# Patient Record
Sex: FEMALE | Race: WHITE | Employment: FULL TIME | URBAN - METROPOLITAN AREA
[De-identification: names, ages, dates, MRNs, and addresses within clinical notes are randomized per-mention and may not be internally consistent; named-entity substitution may affect disease eponyms.]

---

## 2017-05-22 PROBLEM — I10 ESSENTIAL HYPERTENSION: Status: ACTIVE | Noted: 2017-05-22

## 2017-08-31 PROBLEM — M16.0 PRIMARY OSTEOARTHRITIS OF BOTH HIPS: Status: ACTIVE | Noted: 2017-08-31

## 2017-08-31 PROBLEM — M17.12 PRIMARY OSTEOARTHRITIS OF LEFT KNEE: Status: ACTIVE | Noted: 2017-08-31

## 2017-08-31 PROBLEM — M79.10 MYALGIA: Status: ACTIVE | Noted: 2017-08-31

## 2017-08-31 PROBLEM — R53.83 FATIGUE: Status: ACTIVE | Noted: 2017-08-31

## 2018-04-18 RX ORDER — METOPROLOL TARTRATE 50 MG/1
TABLET, FILM COATED ORAL
Qty: 60 TABLET | Refills: 4 | Status: SHIPPED | OUTPATIENT
Start: 2018-04-18 | End: 2018-09-16 | Stop reason: SDUPTHER

## 2018-05-15 RX ORDER — LOSARTAN POTASSIUM 100 MG/1
TABLET ORAL
Qty: 30 TABLET | Refills: 4 | Status: SHIPPED | OUTPATIENT
Start: 2018-05-15 | End: 2018-10-17 | Stop reason: SDUPTHER

## 2018-09-17 RX ORDER — METOPROLOL TARTRATE 50 MG/1
TABLET, FILM COATED ORAL
Qty: 60 TABLET | Refills: 3 | Status: SHIPPED | OUTPATIENT
Start: 2018-09-17 | End: 2019-01-11 | Stop reason: SDUPTHER

## 2018-10-18 RX ORDER — LOSARTAN POTASSIUM 100 MG/1
TABLET ORAL
Qty: 30 TABLET | Refills: 3 | Status: SHIPPED | OUTPATIENT
Start: 2018-10-18 | End: 2019-02-12 | Stop reason: SDUPTHER

## 2019-01-11 RX ORDER — METOPROLOL TARTRATE 50 MG/1
TABLET, FILM COATED ORAL
Qty: 60 TABLET | Refills: 5 | Status: SHIPPED | OUTPATIENT
Start: 2019-01-11 | End: 2019-07-11 | Stop reason: SDUPTHER

## 2019-02-12 RX ORDER — LOSARTAN POTASSIUM 100 MG/1
TABLET ORAL
Qty: 30 TABLET | Refills: 5 | Status: SHIPPED | OUTPATIENT
Start: 2019-02-12 | End: 2019-08-11 | Stop reason: SDUPTHER

## 2019-05-20 ENCOUNTER — HOSPITAL ENCOUNTER (OUTPATIENT)
Age: 54
Discharge: HOME | End: 2019-05-22
Payer: COMMERCIAL

## 2019-05-20 ENCOUNTER — OFFICE VISIT (OUTPATIENT)
Dept: FAMILY MEDICINE CLINIC | Age: 54
End: 2019-05-20
Payer: COMMERCIAL

## 2019-05-20 VITALS
SYSTOLIC BLOOD PRESSURE: 122 MMHG | WEIGHT: 248 LBS | RESPIRATION RATE: 16 BRPM | OXYGEN SATURATION: 94 % | HEART RATE: 61 BPM | BODY MASS INDEX: 42.34 KG/M2 | HEIGHT: 64 IN | DIASTOLIC BLOOD PRESSURE: 80 MMHG

## 2019-05-20 DIAGNOSIS — R53.83 FATIGUE, UNSPECIFIED TYPE: ICD-10-CM

## 2019-05-20 DIAGNOSIS — I10 ESSENTIAL HYPERTENSION: Primary | ICD-10-CM

## 2019-05-20 DIAGNOSIS — I10 ESSENTIAL HYPERTENSION: ICD-10-CM

## 2019-05-20 DIAGNOSIS — M79.10 MYALGIA: ICD-10-CM

## 2019-05-20 LAB
HCT VFR BLD CALC: 44.6 % (ref 34–48)
HEMOGLOBIN: 14.3 G/DL (ref 11.5–15.5)
MCH RBC QN AUTO: 28.3 PG (ref 26–35)
MCHC RBC AUTO-ENTMCNC: 32.1 % (ref 32–34.5)
MCV RBC AUTO: 88.1 FL (ref 80–99.9)
PDW BLD-RTO: 13.6 FL (ref 11.5–15)
PLATELET # BLD: 230 E9/L (ref 130–450)
PMV BLD AUTO: 12.1 FL (ref 7–12)
RBC # BLD: 5.06 E12/L (ref 3.5–5.5)
WBC # BLD: 6.2 E9/L (ref 4.5–11.5)

## 2019-05-20 PROCEDURE — 84443 ASSAY THYROID STIM HORMONE: CPT

## 2019-05-20 PROCEDURE — 80053 COMPREHEN METABOLIC PANEL: CPT

## 2019-05-20 PROCEDURE — G8427 DOCREV CUR MEDS BY ELIG CLIN: HCPCS | Performed by: FAMILY MEDICINE

## 2019-05-20 PROCEDURE — 36415 COLL VENOUS BLD VENIPUNCTURE: CPT | Performed by: FAMILY MEDICINE

## 2019-05-20 PROCEDURE — G8419 CALC BMI OUT NRM PARAM NOF/U: HCPCS | Performed by: FAMILY MEDICINE

## 2019-05-20 PROCEDURE — 82607 VITAMIN B-12: CPT

## 2019-05-20 PROCEDURE — 80061 LIPID PANEL: CPT

## 2019-05-20 PROCEDURE — 85027 COMPLETE CBC AUTOMATED: CPT

## 2019-05-20 PROCEDURE — 86431 RHEUMATOID FACTOR QUANT: CPT

## 2019-05-20 PROCEDURE — 82746 ASSAY OF FOLIC ACID SERUM: CPT

## 2019-05-20 PROCEDURE — 86038 ANTINUCLEAR ANTIBODIES: CPT

## 2019-05-20 PROCEDURE — 3017F COLORECTAL CA SCREEN DOC REV: CPT | Performed by: FAMILY MEDICINE

## 2019-05-20 PROCEDURE — 99214 OFFICE O/P EST MOD 30 MIN: CPT | Performed by: FAMILY MEDICINE

## 2019-05-20 PROCEDURE — 85651 RBC SED RATE NONAUTOMATED: CPT

## 2019-05-20 PROCEDURE — 1036F TOBACCO NON-USER: CPT | Performed by: FAMILY MEDICINE

## 2019-05-20 PROCEDURE — 86039 ANTINUCLEAR ANTIBODIES (ANA): CPT

## 2019-05-20 RX ORDER — CETIRIZINE HYDROCHLORIDE 10 MG/1
10 TABLET ORAL DAILY
COMMUNITY
End: 2022-02-24

## 2019-05-20 SDOH — HEALTH STABILITY: MENTAL HEALTH: HOW MANY STANDARD DRINKS CONTAINING ALCOHOL DO YOU HAVE ON A TYPICAL DAY?: 3 OR 4

## 2019-05-20 SDOH — HEALTH STABILITY: MENTAL HEALTH: HOW OFTEN DO YOU HAVE A DRINK CONTAINING ALCOHOL?: 2-3 TIMES A WEEK

## 2019-05-20 ASSESSMENT — PATIENT HEALTH QUESTIONNAIRE - PHQ9
2. FEELING DOWN, DEPRESSED OR HOPELESS: 0
SUM OF ALL RESPONSES TO PHQ9 QUESTIONS 1 & 2: 0
1. LITTLE INTEREST OR PLEASURE IN DOING THINGS: 0
SUM OF ALL RESPONSES TO PHQ QUESTIONS 1-9: 0
SUM OF ALL RESPONSES TO PHQ QUESTIONS 1-9: 0

## 2019-05-20 ASSESSMENT — ENCOUNTER SYMPTOMS
SINUS PRESSURE: 0
EYE PAIN: 0
SHORTNESS OF BREATH: 0
NAUSEA: 0
COLOR CHANGE: 0
RHINORRHEA: 0
EYE REDNESS: 0
DIARRHEA: 0
BACK PAIN: 0
CONSTIPATION: 0
CHEST TIGHTNESS: 0
EYE ITCHING: 0
BLOOD IN STOOL: 0
SORE THROAT: 0
APNEA: 0
WHEEZING: 0
ABDOMINAL PAIN: 0
COUGH: 0
VOMITING: 0
VISUAL CHANGE: 0

## 2019-05-20 NOTE — PROGRESS NOTES
Chief Complaint:     Chief Complaint   Patient presents with    Hypertension     follow up    Pain     nerve pain, bottom of feet, feels like pins and needles, also has pain under her left armpit, cant do certain movements.  Fatigue         Foot Pain   This is a recurrent problem. The current episode started more than 1 month ago. The problem occurs intermittently. The problem has been waxing and waning. Associated symptoms include arthralgias and myalgias. Pertinent negatives include no abdominal pain, chest pain, chills, congestion, coughing, fatigue, fever, headaches, nausea, neck pain, numbness, rash, sore throat, visual change, vomiting or weakness. Nothing aggravates the symptoms. She has tried nothing for the symptoms. The treatment provided no relief. Hypertension   This is a chronic problem. The current episode started more than 1 year ago. The problem has been waxing and waning since onset. The problem is uncontrolled. Pertinent negatives include no chest pain, headaches, neck pain, palpitations or shortness of breath. There are no associated agents to hypertension. Risk factors for coronary artery disease include stress, obesity and post-menopausal state. Past treatments include angiotensin blockers, beta blockers and calcium channel blockers. The current treatment provides moderate improvement. There is no history of CVA or PVD. There is no history of renovascular disease, sleep apnea or a thyroid problem. Fatigue   This is a new problem. The current episode started more than 1 month ago. The problem occurs intermittently. The problem has been waxing and waning. Associated symptoms include arthralgias and myalgias. Pertinent negatives include no abdominal pain, chest pain, chills, congestion, coughing, fatigue, fever, headaches, nausea, neck pain, numbness, rash, sore throat, visual change, vomiting or weakness. Nothing aggravates the symptoms. She has tried nothing for the symptoms.  The treatment provided no relief. Patient Active Problem List   Diagnosis    Essential hypertension    Myalgia    Fatigue    Primary osteoarthritis of left knee    Primary osteoarthritis of both hips       Past Medical History:   Diagnosis Date    Essential hypertension 5/22/2017       History reviewed. No pertinent surgical history. Current Outpatient Medications   Medication Sig Dispense Refill    cetirizine (ZYRTEC ALLERGY) 10 MG tablet Take 10 mg by mouth daily      amLODIPine (NORVASC) 10 MG tablet TAKE ONE TABLET BY MOUTH EVERY DAY 30 tablet 5    losartan (COZAAR) 100 MG tablet TAKE ONE TABLET BY MOUTH EVERY DAY 30 tablet 5    metoprolol tartrate (LOPRESSOR) 50 MG tablet TAKE ONE TABLET BY MOUTH TWO TIMES A DAY 60 tablet 5    aspirin 81 MG tablet Take 81 mg by mouth daily      Potassium 99 MG TABS Take 99 mg by mouth       No current facility-administered medications for this visit.         No Known Allergies    Social History     Socioeconomic History    Marital status:      Spouse name: None    Number of children: None    Years of education: None    Highest education level: None   Occupational History     Employer: [employer]   Social Needs    Financial resource strain: None    Food insecurity:     Worry: None     Inability: None    Transportation needs:     Medical: None     Non-medical: None   Tobacco Use    Smoking status: Former Smoker     Packs/day: 0.50     Years: 35.00     Pack years: 17.50     Last attempt to quit: 5/20/2009     Years since quitting: 10.0    Smokeless tobacco: Never Used   Substance and Sexual Activity    Alcohol use: Yes     Frequency: 2-3 times a week     Drinks per session: 3 or 4    Drug use: None    Sexual activity: None   Lifestyle    Physical activity:     Days per week: None     Minutes per session: None    Stress: None   Relationships    Social connections:     Talks on phone: None     Gets together: None     Attends Mosque service: None     Active member of club or organization: None     Attends meetings of clubs or organizations: None     Relationship status: None    Intimate partner violence:     Fear of current or ex partner: None     Emotionally abused: None     Physically abused: None     Forced sexual activity: None   Other Topics Concern    None   Social History Narrative    None       History reviewed. No pertinent family history. Review of Systems   Constitutional: Negative for activity change, appetite change, chills, fatigue and fever. HENT: Negative for congestion, ear pain, hearing loss, nosebleeds, rhinorrhea, sinus pressure and sore throat. Eyes: Negative for pain, redness, itching and visual disturbance. Respiratory: Negative for apnea, cough, chest tightness, shortness of breath and wheezing. Cardiovascular: Negative for chest pain, palpitations and leg swelling. Gastrointestinal: Negative for abdominal pain, blood in stool, constipation, diarrhea, nausea and vomiting. Endocrine: Negative. Genitourinary: Negative for decreased urine volume, difficulty urinating, dysuria, frequency, hematuria and urgency. Musculoskeletal: Positive for arthralgias and myalgias. Negative for back pain, gait problem and neck pain. Skin: Negative for color change and rash. Allergic/Immunologic: Negative for environmental allergies and food allergies. Neurological: Negative for dizziness, tingling, weakness, light-headedness, numbness and headaches. Hematological: Negative for adenopathy. Does not bruise/bleed easily. Psychiatric/Behavioral: Negative for behavioral problems, dysphoric mood and sleep disturbance. The patient is not nervous/anxious and is not hyperactive. All other systems reviewed and are negative.         /80   Pulse 61   Resp 16   Ht 5' 4\" (1.626 m)   Wt 248 lb (112.5 kg)   SpO2 94%   BMI 42.57 kg/m²     Physical Exam   Constitutional: She is oriented to person, place, and time. She appears well-developed and well-nourished. HENT:   Head: Normocephalic and atraumatic. Right Ear: External ear normal.   Left Ear: External ear normal.   Nose: Nose normal.   Mouth/Throat: Oropharynx is clear and moist.   Eyes: Pupils are equal, round, and reactive to light. Conjunctivae and EOM are normal. No scleral icterus. Neck: Normal range of motion. Neck supple. No thyromegaly present. Cardiovascular: Normal rate, regular rhythm and normal heart sounds. No murmur heard. Pulmonary/Chest: Effort normal and breath sounds normal. No respiratory distress. She has no wheezes. She has no rales. Abdominal: Soft. Bowel sounds are normal. She exhibits no distension. There is no tenderness. Musculoskeletal: Normal range of motion. She exhibits no edema or tenderness. Lymphadenopathy:     She has no cervical adenopathy. Neurological: She is alert and oriented to person, place, and time. She has normal reflexes. She displays normal reflexes. No cranial nerve deficit. Skin: Skin is warm and dry. No rash noted. No erythema. Psychiatric: She has a normal mood and affect. Nursing note and vitals reviewed. ASSESSMENT/PLAN:    Patient Active Problem List   Diagnosis    Essential hypertension    Myalgia    Fatigue    Primary osteoarthritis of left knee    Primary osteoarthritis of both hips       Caroline Neri was seen today for hypertension, pain and fatigue. Diagnoses and all orders for this visit:    Essential hypertension  -     CBC; Future  -     Comprehensive Metabolic Panel; Future  -     Lipid Panel; Future  -     TSH without Reflex; Future    Myalgia  -     Sedimentation Rate; Future  -     Rheumatoid Factor; Future  -     BELEN; Future    Fatigue, unspecified type  -     Vitamin B12 & Folate; Future          Return in about 6 months (around 11/20/2019) for Follow up HTN, Recheck labs, Recheck Meds.         Dian Brizuela DO  5/20/2019  9:34 AM

## 2019-05-21 LAB
ALBUMIN SERPL-MCNC: 4.3 G/DL (ref 3.5–5.2)
ALP BLD-CCNC: 56 U/L (ref 35–104)
ALT SERPL-CCNC: 22 U/L (ref 0–32)
ANION GAP SERPL CALCULATED.3IONS-SCNC: 16 MMOL/L (ref 7–16)
AST SERPL-CCNC: 17 U/L (ref 0–31)
BILIRUB SERPL-MCNC: 0.7 MG/DL (ref 0–1.2)
BUN BLDV-MCNC: 14 MG/DL (ref 6–20)
CALCIUM SERPL-MCNC: 9.5 MG/DL (ref 8.6–10.2)
CHLORIDE BLD-SCNC: 105 MMOL/L (ref 98–107)
CHOLESTEROL, TOTAL: 194 MG/DL (ref 0–199)
CO2: 24 MMOL/L (ref 22–29)
CREAT SERPL-MCNC: 0.6 MG/DL (ref 0.5–1)
FOLATE: 12.4 NG/ML (ref 4.8–24.2)
GFR AFRICAN AMERICAN: >60
GFR NON-AFRICAN AMERICAN: >60 ML/MIN/1.73
GLUCOSE BLD-MCNC: 108 MG/DL (ref 74–99)
HDLC SERPL-MCNC: 57 MG/DL
LDL CHOLESTEROL CALCULATED: 116 MG/DL (ref 0–99)
POTASSIUM SERPL-SCNC: 4.1 MMOL/L (ref 3.5–5)
RHEUMATOID FACTOR: <10 IU/ML (ref 0–13)
SEDIMENTATION RATE, ERYTHROCYTE: 2 MM/HR (ref 0–20)
SODIUM BLD-SCNC: 145 MMOL/L (ref 132–146)
TOTAL PROTEIN: 7 G/DL (ref 6.4–8.3)
TRIGL SERPL-MCNC: 105 MG/DL (ref 0–149)
TSH SERPL DL<=0.05 MIU/L-ACNC: 1.34 UIU/ML (ref 0.27–4.2)
VITAMIN B-12: 543 PG/ML (ref 211–946)
VLDLC SERPL CALC-MCNC: 21 MG/DL

## 2019-05-21 NOTE — TELEPHONE ENCOUNTER
Pt states that she has used her mothers gel and it helps a lot. She is asking that you send her some in her name.

## 2019-05-22 LAB
ANA PATTERN: ABNORMAL
ANA TITER: ABNORMAL
ANTI-NUCLEAR ANTIBODY (ANA): POSITIVE

## 2019-05-28 ENCOUNTER — TELEPHONE (OUTPATIENT)
Dept: FAMILY MEDICINE CLINIC | Age: 54
End: 2019-05-28

## 2019-05-28 DIAGNOSIS — R76.8 ANA POSITIVE: ICD-10-CM

## 2019-05-28 DIAGNOSIS — M79.10 MYALGIA: Primary | ICD-10-CM

## 2019-05-28 NOTE — TELEPHONE ENCOUNTER
----- Message from Peng Pollard MA sent at 5/28/2019 10:56 AM EDT -----  Pt notified.   Please send referral.

## 2019-07-11 RX ORDER — METOPROLOL TARTRATE 50 MG/1
TABLET, FILM COATED ORAL
Qty: 60 TABLET | Refills: 4 | Status: SHIPPED | OUTPATIENT
Start: 2019-07-11 | End: 2019-12-10 | Stop reason: SDUPTHER

## 2019-07-22 ENCOUNTER — OFFICE VISIT (OUTPATIENT)
Dept: RHEUMATOLOGY | Age: 54
End: 2019-07-22
Payer: COMMERCIAL

## 2019-07-22 ENCOUNTER — HOSPITAL ENCOUNTER (OUTPATIENT)
Age: 54
Discharge: HOME | End: 2019-07-24
Payer: COMMERCIAL

## 2019-07-22 VITALS
TEMPERATURE: 98.1 F | SYSTOLIC BLOOD PRESSURE: 132 MMHG | DIASTOLIC BLOOD PRESSURE: 88 MMHG | OXYGEN SATURATION: 95 % | BODY MASS INDEX: 40.74 KG/M2 | WEIGHT: 238.6 LBS | HEART RATE: 74 BPM | HEIGHT: 64 IN

## 2019-07-22 DIAGNOSIS — G89.29 CHRONIC LEFT SHOULDER PAIN: ICD-10-CM

## 2019-07-22 DIAGNOSIS — M25.512 CHRONIC LEFT SHOULDER PAIN: ICD-10-CM

## 2019-07-22 DIAGNOSIS — R76.8 ANA POSITIVE: ICD-10-CM

## 2019-07-22 DIAGNOSIS — R76.0 RAISED ANTIBODY TITER: ICD-10-CM

## 2019-07-22 DIAGNOSIS — M13.0 POLYARTHRITIS: ICD-10-CM

## 2019-07-22 LAB — URIC ACID, SERUM: 4.8 MG/DL (ref 2.4–5.7)

## 2019-07-22 PROCEDURE — 84550 ASSAY OF BLOOD/URIC ACID: CPT

## 2019-07-22 PROCEDURE — 86225 DNA ANTIBODY NATIVE: CPT

## 2019-07-22 PROCEDURE — 36415 COLL VENOUS BLD VENIPUNCTURE: CPT

## 2019-07-22 PROCEDURE — 99203 OFFICE O/P NEW LOW 30 MIN: CPT | Performed by: INTERNAL MEDICINE

## 2019-07-22 PROCEDURE — 86255 FLUORESCENT ANTIBODY SCREEN: CPT

## 2019-07-22 PROCEDURE — 86200 CCP ANTIBODY: CPT

## 2019-07-22 PROCEDURE — 86235 NUCLEAR ANTIGEN ANTIBODY: CPT

## 2019-07-22 PROCEDURE — 86803 HEPATITIS C AB TEST: CPT

## 2019-07-22 RX ORDER — MELOXICAM 15 MG/1
15 TABLET ORAL DAILY
Qty: 30 TABLET | Refills: 2 | Status: SHIPPED | OUTPATIENT
Start: 2019-07-22 | End: 2021-03-12

## 2019-07-22 ASSESSMENT — ENCOUNTER SYMPTOMS
EYE PAIN: 0
SINUS PRESSURE: 0
EYE ITCHING: 0
SHORTNESS OF BREATH: 0
EYE DISCHARGE: 0
BLOOD IN STOOL: 0
COLOR CHANGE: 0
EYE REDNESS: 0
ABDOMINAL DISTENTION: 0
CONSTIPATION: 0
PHOTOPHOBIA: 0
TROUBLE SWALLOWING: 0
WHEEZING: 0
SORE THROAT: 0
DIARRHEA: 0
VOMITING: 0
CHEST TIGHTNESS: 0
COUGH: 0
ABDOMINAL PAIN: 0
SINUS PAIN: 0

## 2019-07-22 NOTE — PROGRESS NOTES
19    Name: Katherine Doran  : 1965  Age: 52 y.o. Sex: female    Patient is seen at the request of Ivon Smith DO    Patient presents for a rheumatology consultation regarding     Chief Complaint   Patient presents with    Joint Pain     1st MTP, L knee, Ankle, (+) BELEN    Memory Loss    Joint Swelling       + BELEN , Joint pain   Pain in hips, points outside right hip, unable to sleep on affected side   Pain and swelling in left knee   Pain in feet mainly in first MTP   No red and swollen joints   Pain increases with sitting and improves with activity,   Morning stiffness which improves after  Hot shower ,  Takes Aleve as needed which helps her   H/o left shoulder pain, unable to touch her back , no trauma, no PT,no recent imaging  Reports difficulty in balance   Denies personal and family history of Psoriasis and IBD. Vitals:    19 1447   BP: 132/88   Site: Left Lower Arm   Position: Sitting   Pulse: 74   Temp: 98.1 °F (36.7 °C)   TempSrc: Temporal   SpO2: 95%   Weight: 238 lb 9.6 oz (108.2 kg)   Height: 5' 4\" (1.626 m)        Review of Systems   Constitutional: Negative for chills, fatigue, fever and unexpected weight change. HENT: Negative for congestion, ear discharge, ear pain, hearing loss, mouth sores, nosebleeds, sinus pressure, sinus pain, sore throat and trouble swallowing. Dry eyes , confirmed by Ophthalmologist    Eyes: Negative for photophobia, pain, discharge, redness and itching. Respiratory: Negative for cough, chest tightness, shortness of breath and wheezing. Cardiovascular: Negative for chest pain, palpitations and leg swelling. Gastrointestinal: Negative for abdominal distention, abdominal pain, blood in stool, constipation, diarrhea and vomiting. Endocrine: Negative for cold intolerance, heat intolerance, polydipsia, polyphagia and polyuria. Genitourinary: Negative for dysuria, flank pain, genital sores and hematuria.    Musculoskeletal:

## 2019-07-23 LAB — HEPATITIS C ANTIBODY INTERPRETATION: NORMAL

## 2019-07-24 LAB
ANTI DNA DOUBLE STRANDED: NEGATIVE
ANTI JO-1 IGG: NEGATIVE
ANTI-MITOCHONDRIAL AB, IFA: NEGATIVE
ENA TO RNP ANTIBODY: NEGATIVE
ENA TO SMITH (SM) ANTIBODY: NEGATIVE
ENA TO SSA (RO) ANTIBODY: NEGATIVE
ENA TO SSB (LA) ANTIBODY: NEGATIVE
SCLERODERMA (SCL-70) AB: NEGATIVE

## 2019-07-25 LAB — CCP IGG ANTIBODIES: 3 UNITS (ref 0–19)

## 2019-08-05 ENCOUNTER — TELEPHONE (OUTPATIENT)
Dept: RHEUMATOLOGY | Age: 54
End: 2019-08-05

## 2019-08-07 ENCOUNTER — OFFICE VISIT (OUTPATIENT)
Dept: RHEUMATOLOGY | Age: 54
End: 2019-08-07
Payer: COMMERCIAL

## 2019-08-07 VITALS
BODY MASS INDEX: 40.8 KG/M2 | DIASTOLIC BLOOD PRESSURE: 82 MMHG | OXYGEN SATURATION: 99 % | HEIGHT: 64 IN | SYSTOLIC BLOOD PRESSURE: 134 MMHG | TEMPERATURE: 97.5 F | HEART RATE: 62 BPM | WEIGHT: 239 LBS

## 2019-08-07 DIAGNOSIS — M25.512 CHRONIC LEFT SHOULDER PAIN: Primary | ICD-10-CM

## 2019-08-07 DIAGNOSIS — G89.29 CHRONIC LEFT SHOULDER PAIN: Primary | ICD-10-CM

## 2019-08-07 PROCEDURE — 99214 OFFICE O/P EST MOD 30 MIN: CPT | Performed by: INTERNAL MEDICINE

## 2019-08-07 ASSESSMENT — ENCOUNTER SYMPTOMS
DIARRHEA: 0
WHEEZING: 0
COUGH: 0
EYE REDNESS: 0
SINUS PRESSURE: 0
CHEST TIGHTNESS: 0
EYE ITCHING: 0
SHORTNESS OF BREATH: 0
TROUBLE SWALLOWING: 0
ABDOMINAL DISTENTION: 0
SINUS PAIN: 0
BACK PAIN: 0
CONSTIPATION: 0
EYE DISCHARGE: 0
VOMITING: 0
EYE PAIN: 0
BLOOD IN STOOL: 0
ABDOMINAL PAIN: 0
COLOR CHANGE: 0
PHOTOPHOBIA: 0
SORE THROAT: 0

## 2019-08-07 NOTE — PROGRESS NOTES
status:      Spouse name: Not on file    Number of children: Not on file    Years of education: Not on file    Highest education level: Not on file   Occupational History     Employer: [employer]   Social Needs    Financial resource strain: Not on file    Food insecurity:     Worry: Not on file     Inability: Not on file    Transportation needs:     Medical: Not on file     Non-medical: Not on file   Tobacco Use    Smoking status: Former Smoker     Packs/day: 0.50     Years: 35.00     Pack years: 17.50     Last attempt to quit: 5/20/2009     Years since quitting: 10.2    Smokeless tobacco: Never Used   Substance and Sexual Activity    Alcohol use: Yes     Frequency: 2-3 times a week     Drinks per session: 3 or 4    Drug use: Never    Sexual activity: Not Currently     Partners: Male   Lifestyle    Physical activity:     Days per week: Not on file     Minutes per session: Not on file    Stress: Not on file   Relationships    Social connections:     Talks on phone: Not on file     Gets together: Not on file     Attends Restorationist service: Not on file     Active member of club or organization: Not on file     Attends meetings of clubs or organizations: Not on file     Relationship status: Not on file    Intimate partner violence:     Fear of current or ex partner: Not on file     Emotionally abused: Not on file     Physically abused: Not on file     Forced sexual activity: Not on file   Other Topics Concern    Not on file   Social History Narrative    Not on file      Social History     Social History Narrative    One son        Vitals:    08/07/19 1038   BP: 134/82   Site: Left Upper Arm   Position: Sitting   Pulse: 62   Temp: 97.5 °F (36.4 °C)   TempSrc: Temporal   SpO2: 99%   Weight: 239 lb (108.4 kg)   Height: 5' 4\" (1.626 m)        Physical Exam   Constitutional: She appears well-developed and well-nourished. No distress. HENT:   Head: Normocephalic.    Right Ear: External ear normal.

## 2019-08-12 ENCOUNTER — TELEPHONE (OUTPATIENT)
Dept: RHEUMATOLOGY | Age: 54
End: 2019-08-12

## 2019-08-12 DIAGNOSIS — M25.512 CHRONIC LEFT SHOULDER PAIN: Primary | ICD-10-CM

## 2019-08-12 DIAGNOSIS — G89.29 CHRONIC LEFT SHOULDER PAIN: Primary | ICD-10-CM

## 2019-08-12 RX ORDER — LOSARTAN POTASSIUM 100 MG/1
TABLET ORAL
Qty: 30 TABLET | Refills: 4 | Status: SHIPPED | OUTPATIENT
Start: 2019-08-12 | End: 2020-01-13

## 2019-09-23 ENCOUNTER — TELEPHONE (OUTPATIENT)
Dept: PRIMARY CARE CLINIC | Age: 54
End: 2019-09-23

## 2019-09-23 DIAGNOSIS — G89.29 CHRONIC LEFT SHOULDER PAIN: Primary | ICD-10-CM

## 2019-09-23 DIAGNOSIS — M25.512 CHRONIC LEFT SHOULDER PAIN: Primary | ICD-10-CM

## 2020-02-10 RX ORDER — METOPROLOL TARTRATE 50 MG/1
TABLET, FILM COATED ORAL
Qty: 60 TABLET | Refills: 0 | Status: SHIPPED
Start: 2020-02-10 | End: 2020-03-13

## 2020-02-14 ENCOUNTER — OFFICE VISIT (OUTPATIENT)
Dept: PRIMARY CARE CLINIC | Age: 55
End: 2020-02-14
Payer: COMMERCIAL

## 2020-02-14 VITALS
HEIGHT: 64 IN | TEMPERATURE: 101.3 F | OXYGEN SATURATION: 96 % | RESPIRATION RATE: 20 BRPM | BODY MASS INDEX: 40.8 KG/M2 | HEART RATE: 81 BPM | SYSTOLIC BLOOD PRESSURE: 114 MMHG | WEIGHT: 239 LBS | DIASTOLIC BLOOD PRESSURE: 80 MMHG

## 2020-02-14 PROBLEM — F32.1 CURRENT MODERATE EPISODE OF MAJOR DEPRESSIVE DISORDER WITHOUT PRIOR EPISODE (HCC): Status: ACTIVE | Noted: 2020-02-14

## 2020-02-14 LAB
INFLUENZA A ANTIBODY: ABNORMAL
INFLUENZA B ANTIBODY: ABNORMAL

## 2020-02-14 PROCEDURE — 99214 OFFICE O/P EST MOD 30 MIN: CPT | Performed by: FAMILY MEDICINE

## 2020-02-14 PROCEDURE — 87804 INFLUENZA ASSAY W/OPTIC: CPT | Performed by: FAMILY MEDICINE

## 2020-02-14 PROCEDURE — 96372 THER/PROPH/DIAG INJ SC/IM: CPT | Performed by: FAMILY MEDICINE

## 2020-02-14 RX ORDER — OSELTAMIVIR PHOSPHATE 75 MG/1
75 CAPSULE ORAL 2 TIMES DAILY
Qty: 10 CAPSULE | Refills: 0 | Status: SHIPPED | OUTPATIENT
Start: 2020-02-14 | End: 2020-02-19

## 2020-02-14 RX ORDER — METHYLPREDNISOLONE ACETATE 80 MG/ML
80 INJECTION, SUSPENSION INTRA-ARTICULAR; INTRALESIONAL; INTRAMUSCULAR; SOFT TISSUE ONCE
Status: COMPLETED | OUTPATIENT
Start: 2020-02-14 | End: 2020-02-14

## 2020-02-14 RX ADMIN — METHYLPREDNISOLONE ACETATE 80 MG: 80 INJECTION, SUSPENSION INTRA-ARTICULAR; INTRALESIONAL; INTRAMUSCULAR; SOFT TISSUE at 14:52

## 2020-02-14 ASSESSMENT — PATIENT HEALTH QUESTIONNAIRE - PHQ9
1. LITTLE INTEREST OR PLEASURE IN DOING THINGS: 0
SUM OF ALL RESPONSES TO PHQ9 QUESTIONS 1 & 2: 2
SUM OF ALL RESPONSES TO PHQ QUESTIONS 1-9: 2
SUM OF ALL RESPONSES TO PHQ QUESTIONS 1-9: 2
2. FEELING DOWN, DEPRESSED OR HOPELESS: 2

## 2020-02-14 ASSESSMENT — ENCOUNTER SYMPTOMS
SHORTNESS OF BREATH: 0
BLOOD IN STOOL: 0
SORE THROAT: 1
BACK PAIN: 0
COLOR CHANGE: 0
SINUS PRESSURE: 1
EYE REDNESS: 0
EYE PAIN: 0
APNEA: 0
COUGH: 1
CHEST TIGHTNESS: 0
SINUS PAIN: 1
RHINORRHEA: 1
DIARRHEA: 0
CONSTIPATION: 0
VOMITING: 0
EYE ITCHING: 0
WHEEZING: 1
ABDOMINAL PAIN: 0
NAUSEA: 0

## 2020-02-14 NOTE — PROGRESS NOTES
Chief Complaint:     Chief Complaint   Patient presents with    URI     sore throat, cough, achy, wheezing, thick mucus         URI    This is a new problem. The current episode started in the past 7 days. The problem has been unchanged. The maximum temperature recorded prior to her arrival was 100.4 - 100.9 F. Associated symptoms include congestion, coughing, ear pain, rhinorrhea, sinus pain, a sore throat and wheezing. Pertinent negatives include no abdominal pain, chest pain, diarrhea, dysuria, headaches, nausea, neck pain, rash or vomiting. She has tried nothing for the symptoms. The treatment provided no relief. Fatigue   This is a recurrent problem. The current episode started more than 1 month ago. The problem occurs intermittently. The problem has been gradually worsening. Associated symptoms include congestion, coughing, fatigue, a fever and a sore throat. Pertinent negatives include no abdominal pain, arthralgias, chest pain, chills, headaches, myalgias, nausea, neck pain, numbness, rash, vomiting or weakness. Nothing aggravates the symptoms. She has tried nothing for the symptoms. The treatment provided no relief. Mental Health Problem   The primary symptoms include dysphoric mood. The current episode started more than 1 month ago. This is a recurrent problem. The dysphoric mood began more than 2 weeks ago. The mood has been worsening since its onset. She characterizes the problem as moderate. The mood includes feelings of sadness and emptiness. Her change in mood was precipitated by a stressful event. The onset of the illness is precipitated by emotional stress and a stressful event. Additional symptoms of the illness include fatigue. Additional symptoms of the illness do not include anhedonia, insomnia, hypersomnia, appetite change, headaches or abdominal pain. She does not admit to suicidal ideas. She does not have a plan to commit suicide. She does not contemplate harming herself.  She has not already injured self. She does not contemplate injuring another person. She has not already  injured another person. Patient Active Problem List   Diagnosis    Essential hypertension    Myalgia    Fatigue    Primary osteoarthritis of left knee    Primary osteoarthritis of both hips    Polyarthritis    BELEN positive    Raised antibody titer    Chronic left shoulder pain    Current moderate episode of major depressive disorder without prior episode [facility])       Past Medical History:   Diagnosis Date    Essential hypertension 5/22/2017       History reviewed. No pertinent surgical history.     Current Outpatient Medications   Medication Sig Dispense Refill    diclofenac sodium 1 % GEL Apply 2 g topically 2 times daily 1 Tube 5    oseltamivir (TAMIFLU) 75 MG capsule Take 1 capsule by mouth 2 times daily for 5 days 10 capsule 0    amLODIPine (NORVASC) 10 MG tablet TAKE ONE TABLET BY MOUTH EVERY DAY 30 tablet 0    losartan (COZAAR) 100 MG tablet TAKE ONE TABLET BY MOUTH EVERY DAY 30 tablet 0    metoprolol tartrate (LOPRESSOR) 50 MG tablet TAKE ONE TABLET BY MOUTH TWO TIMES A DAY 60 tablet 0    meloxicam (MOBIC) 15 MG tablet Take 1 tablet by mouth daily 30 tablet 2    cetirizine (ZYRTEC ALLERGY) 10 MG tablet Take 10 mg by mouth daily      aspirin 81 MG tablet Take 81 mg by mouth daily      Potassium 99 MG TABS Take 99 mg by mouth       Current Facility-Administered Medications   Medication Dose Route Frequency Provider Last Rate Last Dose    methylPREDNISolone acetate (DEPO-MEDROL) injection 80 mg  80 mg Intramuscular Once Arnold Ali, DO           No Known Allergies    Social History     Socioeconomic History    Marital status:      Spouse name: None    Number of children: None    Years of education: None    Highest education level: None   Occupational History     Employer: [employer]   Social Needs    Financial resource strain: None    Food insecurity:     Worry: None Inability: None    Transportation needs:     Medical: None     Non-medical: None   Tobacco Use    Smoking status: Former Smoker     Packs/day: 0.50     Years: 35.00     Pack years: 17.50     Last attempt to quit: 5/20/2009     Years since quitting: 10.7    Smokeless tobacco: Never Used   Substance and Sexual Activity    Alcohol use: Yes     Frequency: 2-3 times a week     Drinks per session: 3 or 4    Drug use: Never    Sexual activity: Not Currently     Partners: Male   Lifestyle    Physical activity:     Days per week: None     Minutes per session: None    Stress: None   Relationships    Social connections:     Talks on phone: None     Gets together: None     Attends Church service: None     Active member of club or organization: None     Attends meetings of clubs or organizations: None     Relationship status: None    Intimate partner violence:     Fear of current or ex partner: None     Emotionally abused: None     Physically abused: None     Forced sexual activity: None   Other Topics Concern    None   Social History Narrative    None       History reviewed. No pertinent family history. Review of Systems   Constitutional: Positive for fatigue and fever. Negative for activity change, appetite change and chills. HENT: Positive for congestion, ear pain, postnasal drip, rhinorrhea, sinus pressure, sinus pain and sore throat. Negative for ear discharge, hearing loss and nosebleeds. Eyes: Negative for pain, redness, itching and visual disturbance. Respiratory: Positive for cough and wheezing. Negative for apnea, chest tightness and shortness of breath. Cardiovascular: Negative for chest pain, palpitations and leg swelling. Gastrointestinal: Negative for abdominal pain, blood in stool, constipation, diarrhea, nausea and vomiting. Endocrine: Negative. Genitourinary: Negative for decreased urine volume, difficulty urinating, dysuria, frequency, hematuria and urgency. Musculoskeletal: Negative for arthralgias, back pain, gait problem, myalgias and neck pain. Skin: Negative for color change and rash. Allergic/Immunologic: Negative for environmental allergies and food allergies. Neurological: Negative for dizziness, weakness, light-headedness, numbness and headaches. Hematological: Negative for adenopathy. Does not bruise/bleed easily. Psychiatric/Behavioral: Positive for dysphoric mood. Negative for behavioral problems and sleep disturbance. The patient is not nervous/anxious, does not have insomnia and is not hyperactive. All other systems reviewed and are negative. /80   Pulse 81   Temp 101.3 °F (38.5 °C) (Temporal)   Resp 20   Ht 5' 4\" (1.626 m)   Wt 239 lb (108.4 kg)   SpO2 96%   BMI 41.02 kg/m²     Physical Exam  Vitals signs and nursing note reviewed. Constitutional:       General: She is not in acute distress. Appearance: Normal appearance. She is well-developed. She is not toxic-appearing. HENT:      Head: Normocephalic and atraumatic. Right Ear: Hearing, ear canal and external ear normal. No tenderness. A middle ear effusion is present. Tympanic membrane is bulging. Left Ear: Hearing, ear canal and external ear normal. No tenderness. A middle ear effusion is present. Tympanic membrane is bulging. Nose: Mucosal edema, congestion and rhinorrhea present. No nasal deformity or septal deviation. Right Turbinates: Enlarged and swollen. Left Turbinates: Enlarged and swollen. Right Sinus: Maxillary sinus tenderness present. Left Sinus: Maxillary sinus tenderness present. Mouth/Throat:      Mouth: Mucous membranes are moist. Mucous membranes are not pale and not dry. Tongue: No lesions. Pharynx: Oropharynx is clear. Uvula midline. Posterior oropharyngeal erythema present. No oropharyngeal exudate. Tonsils: No tonsillar abscesses.    Eyes:      General: Lids are normal. No scleral icterus. Right eye: No discharge. Left eye: No discharge. Extraocular Movements: Extraocular movements intact. Right eye: Normal extraocular motion. Left eye: Normal extraocular motion. Conjunctiva/sclera: Conjunctivae normal.      Right eye: Right conjunctiva is not injected. Left eye: Left conjunctiva is not injected. Pupils: Pupils are equal, round, and reactive to light. Neck:      Musculoskeletal: Normal range of motion and neck supple. No neck rigidity or muscular tenderness. Thyroid: No thyromegaly. Cardiovascular:      Rate and Rhythm: Normal rate and regular rhythm. Heart sounds: Normal heart sounds. No murmur. Pulmonary:      Effort: Pulmonary effort is normal. No respiratory distress. Breath sounds: Normal breath sounds. No wheezing or rales. Chest:      Chest wall: No tenderness. Abdominal:      General: Bowel sounds are normal. There is no distension. Palpations: Abdomen is soft. Tenderness: There is no abdominal tenderness. Musculoskeletal: Normal range of motion. General: No tenderness. Lymphadenopathy:      Cervical: Cervical adenopathy present. Skin:     General: Skin is warm and dry. Findings: No erythema or rash. Neurological:      General: No focal deficit present. Mental Status: She is alert and oriented to person, place, and time. Cranial Nerves: No cranial nerve deficit. Deep Tendon Reflexes: Reflexes are normal and symmetric.  Reflexes normal.   Psychiatric:         Mood and Affect: Mood normal.                                 ASSESSMENT/PLAN:    Patient Active Problem List   Diagnosis    Essential hypertension    Myalgia    Fatigue    Primary osteoarthritis of left knee    Primary osteoarthritis of both hips    Polyarthritis    BELEN positive    Raised antibody titer    Chronic left shoulder pain    Current moderate episode of major depressive disorder without prior

## 2020-02-14 NOTE — LETTER
[facility]  [address] Radha MathewSanteeyo HERRERA [address]  Phone: [phone]  Fax: [phone], DO        February 14, 2020     Patient: Adrianne Deshpande   YOB: 1965   Date of Visit: 2/14/2020       To Whom it May Concern:    Lizeth Zamudio was seen in my clinic on 2/14/2020. She may return to work on 2/17/2020. If you have any questions or concerns, please don't hesitate to call.     Sincerely,         Angela Monet, DO

## 2020-02-17 ENCOUNTER — TELEPHONE (OUTPATIENT)
Dept: PRIMARY CARE CLINIC | Age: 55
End: 2020-02-17

## 2020-02-17 RX ORDER — BUPROPION HYDROCHLORIDE 150 MG/1
150 TABLET ORAL EVERY MORNING
Qty: 30 TABLET | Refills: 3 | Status: SHIPPED
Start: 2020-02-17 | End: 2020-06-10

## 2020-03-13 RX ORDER — LOSARTAN POTASSIUM 100 MG/1
TABLET ORAL
Qty: 30 TABLET | Refills: 0 | Status: SHIPPED
Start: 2020-03-13 | End: 2020-04-13

## 2020-03-13 RX ORDER — AMLODIPINE BESYLATE 10 MG/1
TABLET ORAL
Qty: 30 TABLET | Refills: 0 | Status: SHIPPED
Start: 2020-03-13 | End: 2020-04-13

## 2020-03-13 RX ORDER — METOPROLOL TARTRATE 50 MG/1
TABLET, FILM COATED ORAL
Qty: 60 TABLET | Refills: 0 | Status: SHIPPED
Start: 2020-03-13 | End: 2020-04-13

## 2020-03-24 ENCOUNTER — TELEPHONE (OUTPATIENT)
Dept: PRIMARY CARE CLINIC | Age: 55
End: 2020-03-24

## 2020-03-24 RX ORDER — BUSPIRONE HYDROCHLORIDE 5 MG/1
5 TABLET ORAL 2 TIMES DAILY
Qty: 60 TABLET | Refills: 2 | Status: SHIPPED
Start: 2020-03-24 | End: 2020-06-10

## 2020-04-13 RX ORDER — LOSARTAN POTASSIUM 100 MG/1
TABLET ORAL
Qty: 30 TABLET | Refills: 0 | Status: SHIPPED
Start: 2020-04-13 | End: 2020-05-11

## 2020-04-13 RX ORDER — AMLODIPINE BESYLATE 10 MG/1
TABLET ORAL
Qty: 30 TABLET | Refills: 0 | Status: SHIPPED
Start: 2020-04-13 | End: 2020-05-11

## 2020-04-13 RX ORDER — METOPROLOL TARTRATE 50 MG/1
TABLET, FILM COATED ORAL
Qty: 60 TABLET | Refills: 0 | Status: SHIPPED
Start: 2020-04-13 | End: 2020-05-11

## 2020-05-11 RX ORDER — LOSARTAN POTASSIUM 100 MG/1
TABLET ORAL
Qty: 30 TABLET | Refills: 0 | Status: SHIPPED
Start: 2020-05-11 | End: 2020-06-10

## 2020-05-11 RX ORDER — AMLODIPINE BESYLATE 10 MG/1
TABLET ORAL
Qty: 30 TABLET | Refills: 0 | Status: SHIPPED
Start: 2020-05-11 | End: 2020-06-10

## 2020-05-11 RX ORDER — METOPROLOL TARTRATE 50 MG/1
TABLET, FILM COATED ORAL
Qty: 60 TABLET | Refills: 0 | Status: SHIPPED
Start: 2020-05-11 | End: 2020-06-10

## 2020-06-10 RX ORDER — METOPROLOL TARTRATE 50 MG/1
TABLET, FILM COATED ORAL
Qty: 60 TABLET | Refills: 0 | Status: SHIPPED
Start: 2020-06-10 | End: 2020-07-15

## 2020-06-10 RX ORDER — LOSARTAN POTASSIUM 100 MG/1
TABLET ORAL
Qty: 30 TABLET | Refills: 0 | Status: SHIPPED
Start: 2020-06-10 | End: 2020-07-15

## 2020-06-10 RX ORDER — AMLODIPINE BESYLATE 10 MG/1
TABLET ORAL
Qty: 30 TABLET | Refills: 0 | Status: SHIPPED
Start: 2020-06-10 | End: 2020-07-15

## 2020-06-10 RX ORDER — BUPROPION HYDROCHLORIDE 150 MG/1
TABLET ORAL
Qty: 30 TABLET | Refills: 0 | Status: SHIPPED
Start: 2020-06-10 | End: 2020-07-15

## 2020-06-10 RX ORDER — BUSPIRONE HYDROCHLORIDE 5 MG/1
TABLET ORAL
Qty: 60 TABLET | Refills: 0 | Status: SHIPPED
Start: 2020-06-10 | End: 2020-07-15

## 2020-07-15 RX ORDER — BUPROPION HYDROCHLORIDE 150 MG/1
TABLET ORAL
Qty: 30 TABLET | Refills: 0 | Status: SHIPPED
Start: 2020-07-15 | End: 2020-08-12

## 2020-07-15 RX ORDER — LOSARTAN POTASSIUM 100 MG/1
TABLET ORAL
Qty: 30 TABLET | Refills: 0 | Status: SHIPPED
Start: 2020-07-15 | End: 2020-08-12

## 2020-07-15 RX ORDER — METOPROLOL TARTRATE 50 MG/1
TABLET, FILM COATED ORAL
Qty: 60 TABLET | Refills: 0 | Status: SHIPPED
Start: 2020-07-15 | End: 2020-08-12

## 2020-07-15 RX ORDER — AMLODIPINE BESYLATE 10 MG/1
TABLET ORAL
Qty: 30 TABLET | Refills: 0 | Status: SHIPPED
Start: 2020-07-15 | End: 2020-08-12

## 2020-07-15 RX ORDER — BUSPIRONE HYDROCHLORIDE 5 MG/1
TABLET ORAL
Qty: 60 TABLET | Refills: 0 | Status: SHIPPED
Start: 2020-07-15 | End: 2020-08-12

## 2020-08-12 RX ORDER — METOPROLOL TARTRATE 50 MG/1
TABLET, FILM COATED ORAL
Qty: 60 TABLET | Refills: 0 | Status: SHIPPED
Start: 2020-08-12 | End: 2020-09-14

## 2020-08-12 RX ORDER — LOSARTAN POTASSIUM 100 MG/1
TABLET ORAL
Qty: 30 TABLET | Refills: 0 | Status: SHIPPED
Start: 2020-08-12 | End: 2020-09-14

## 2020-08-12 RX ORDER — AMLODIPINE BESYLATE 10 MG/1
TABLET ORAL
Qty: 30 TABLET | Refills: 0 | Status: SHIPPED
Start: 2020-08-12 | End: 2020-09-14

## 2020-08-12 RX ORDER — BUSPIRONE HYDROCHLORIDE 5 MG/1
TABLET ORAL
Qty: 60 TABLET | Refills: 0 | Status: SHIPPED
Start: 2020-08-12 | End: 2020-09-14

## 2020-08-12 RX ORDER — BUPROPION HYDROCHLORIDE 150 MG/1
TABLET ORAL
Qty: 30 TABLET | Refills: 0 | Status: SHIPPED
Start: 2020-08-12 | End: 2020-09-14

## 2020-09-14 RX ORDER — AMLODIPINE BESYLATE 10 MG/1
TABLET ORAL
Qty: 30 TABLET | Refills: 0 | Status: SHIPPED
Start: 2020-09-14 | End: 2020-10-14

## 2020-09-14 RX ORDER — LOSARTAN POTASSIUM 100 MG/1
TABLET ORAL
Qty: 30 TABLET | Refills: 0 | Status: SHIPPED
Start: 2020-09-14 | End: 2020-10-14

## 2020-09-14 RX ORDER — BUSPIRONE HYDROCHLORIDE 5 MG/1
TABLET ORAL
Qty: 60 TABLET | Refills: 0 | Status: SHIPPED
Start: 2020-09-14 | End: 2020-10-14

## 2020-09-14 RX ORDER — BUPROPION HYDROCHLORIDE 150 MG/1
TABLET ORAL
Qty: 30 TABLET | Refills: 0 | Status: SHIPPED
Start: 2020-09-14 | End: 2020-10-14

## 2020-09-14 RX ORDER — METOPROLOL TARTRATE 50 MG/1
TABLET, FILM COATED ORAL
Qty: 60 TABLET | Refills: 0 | Status: SHIPPED
Start: 2020-09-14 | End: 2020-10-14

## 2020-10-14 RX ORDER — AMLODIPINE BESYLATE 10 MG/1
TABLET ORAL
Qty: 30 TABLET | Refills: 0 | Status: SHIPPED
Start: 2020-10-14 | End: 2020-11-10

## 2020-10-14 RX ORDER — BUSPIRONE HYDROCHLORIDE 5 MG/1
TABLET ORAL
Qty: 60 TABLET | Refills: 0 | Status: SHIPPED
Start: 2020-10-14 | End: 2020-11-10

## 2020-10-14 RX ORDER — BUPROPION HYDROCHLORIDE 150 MG/1
TABLET ORAL
Qty: 30 TABLET | Refills: 0 | Status: SHIPPED
Start: 2020-10-14 | End: 2020-11-10

## 2020-10-14 RX ORDER — LOSARTAN POTASSIUM 100 MG/1
TABLET ORAL
Qty: 30 TABLET | Refills: 0 | Status: SHIPPED
Start: 2020-10-14 | End: 2020-11-10

## 2020-10-14 RX ORDER — METOPROLOL TARTRATE 50 MG/1
TABLET, FILM COATED ORAL
Qty: 60 TABLET | Refills: 0 | Status: SHIPPED
Start: 2020-10-14 | End: 2020-11-10

## 2020-11-10 RX ORDER — BUSPIRONE HYDROCHLORIDE 5 MG/1
TABLET ORAL
Qty: 60 TABLET | Refills: 0 | Status: SHIPPED
Start: 2020-11-10 | End: 2020-12-15 | Stop reason: SDUPTHER

## 2020-11-10 RX ORDER — METOPROLOL TARTRATE 50 MG/1
TABLET, FILM COATED ORAL
Qty: 60 TABLET | Refills: 0 | Status: SHIPPED
Start: 2020-11-10 | End: 2020-12-15 | Stop reason: SDUPTHER

## 2020-11-10 RX ORDER — LOSARTAN POTASSIUM 100 MG/1
TABLET ORAL
Qty: 30 TABLET | Refills: 0 | Status: SHIPPED
Start: 2020-11-10 | End: 2020-12-15 | Stop reason: SDUPTHER

## 2020-11-10 RX ORDER — BUPROPION HYDROCHLORIDE 150 MG/1
TABLET ORAL
Qty: 30 TABLET | Refills: 0 | Status: SHIPPED
Start: 2020-11-10 | End: 2020-12-15 | Stop reason: SDUPTHER

## 2020-11-10 RX ORDER — AMLODIPINE BESYLATE 10 MG/1
TABLET ORAL
Qty: 30 TABLET | Refills: 0 | Status: SHIPPED
Start: 2020-11-10 | End: 2020-12-15 | Stop reason: SDUPTHER

## 2020-12-15 RX ORDER — BUSPIRONE HYDROCHLORIDE 5 MG/1
TABLET ORAL
Qty: 60 TABLET | Refills: 1 | Status: SHIPPED
Start: 2020-12-15 | Stop reason: SDUPTHER

## 2020-12-15 RX ORDER — BUPROPION HYDROCHLORIDE 150 MG/1
TABLET ORAL
Qty: 30 TABLET | Refills: 1 | Status: SHIPPED
Start: 2020-12-15 | Stop reason: SDUPTHER

## 2020-12-15 RX ORDER — METOPROLOL TARTRATE 50 MG/1
TABLET, FILM COATED ORAL
Qty: 60 TABLET | Refills: 1 | Status: SHIPPED
Start: 2020-12-15 | Stop reason: SDUPTHER

## 2020-12-15 RX ORDER — AMLODIPINE BESYLATE 10 MG/1
TABLET ORAL
Qty: 30 TABLET | Refills: 1 | Status: SHIPPED
Start: 2020-12-15 | Stop reason: SDUPTHER

## 2020-12-15 RX ORDER — LOSARTAN POTASSIUM 100 MG/1
TABLET ORAL
Qty: 30 TABLET | Refills: 1 | Status: SHIPPED
Start: 2020-12-15 | Stop reason: SDUPTHER

## 2021-02-07 RX ORDER — BUPROPION HYDROCHLORIDE 150 MG/1
TABLET ORAL
Qty: 30 TABLET | Refills: 0 | Status: SHIPPED
Start: 2021-02-07 | End: 2021-03-13

## 2021-02-07 RX ORDER — BUSPIRONE HYDROCHLORIDE 5 MG/1
TABLET ORAL
Qty: 60 TABLET | Refills: 0 | Status: SHIPPED
Start: 2021-02-07 | End: 2021-03-13

## 2021-02-07 RX ORDER — METOPROLOL TARTRATE 50 MG/1
TABLET, FILM COATED ORAL
Qty: 60 TABLET | Refills: 0 | Status: SHIPPED
Start: 2021-02-07 | End: 2021-03-13

## 2021-02-07 RX ORDER — LOSARTAN POTASSIUM 100 MG/1
TABLET ORAL
Qty: 30 TABLET | Refills: 0 | Status: SHIPPED
Start: 2021-02-07 | End: 2021-03-13

## 2021-02-07 RX ORDER — AMLODIPINE BESYLATE 10 MG/1
TABLET ORAL
Qty: 30 TABLET | Refills: 0 | Status: SHIPPED
Start: 2021-02-07 | End: 2021-03-13

## 2021-03-12 ENCOUNTER — OFFICE VISIT (OUTPATIENT)
Dept: PRIMARY CARE CLINIC | Age: 56
End: 2021-03-12
Payer: COMMERCIAL

## 2021-03-12 VITALS
RESPIRATION RATE: 16 BRPM | DIASTOLIC BLOOD PRESSURE: 70 MMHG | WEIGHT: 230 LBS | HEIGHT: 64 IN | BODY MASS INDEX: 39.27 KG/M2 | SYSTOLIC BLOOD PRESSURE: 134 MMHG | TEMPERATURE: 97.6 F | OXYGEN SATURATION: 98 % | HEART RATE: 85 BPM

## 2021-03-12 DIAGNOSIS — M17.12 PRIMARY OSTEOARTHRITIS OF LEFT KNEE: ICD-10-CM

## 2021-03-12 DIAGNOSIS — I10 ESSENTIAL HYPERTENSION: ICD-10-CM

## 2021-03-12 DIAGNOSIS — I10 ESSENTIAL HYPERTENSION: Primary | ICD-10-CM

## 2021-03-12 DIAGNOSIS — R53.83 FATIGUE, UNSPECIFIED TYPE: ICD-10-CM

## 2021-03-12 DIAGNOSIS — M77.32 HEEL SPUR, LEFT: ICD-10-CM

## 2021-03-12 DIAGNOSIS — F32.1 CURRENT MODERATE EPISODE OF MAJOR DEPRESSIVE DISORDER WITHOUT PRIOR EPISODE (HCC): ICD-10-CM

## 2021-03-12 DIAGNOSIS — E66.01 CLASS 2 SEVERE OBESITY DUE TO EXCESS CALORIES WITH SERIOUS COMORBIDITY AND BODY MASS INDEX (BMI) OF 39.0 TO 39.9 IN ADULT (HCC): ICD-10-CM

## 2021-03-12 PROBLEM — E66.812 CLASS 2 SEVERE OBESITY DUE TO EXCESS CALORIES WITH SERIOUS COMORBIDITY AND BODY MASS INDEX (BMI) OF 39.0 TO 39.9 IN ADULT: Status: ACTIVE | Noted: 2021-03-12

## 2021-03-12 LAB
ALBUMIN SERPL-MCNC: 4.6 G/DL (ref 3.5–5.2)
ALP BLD-CCNC: 67 U/L (ref 35–104)
ALT SERPL-CCNC: 20 U/L (ref 0–32)
ANION GAP SERPL CALCULATED.3IONS-SCNC: 15 MMOL/L (ref 7–16)
AST SERPL-CCNC: 19 U/L (ref 0–31)
BILIRUB SERPL-MCNC: 0.6 MG/DL (ref 0–1.2)
BUN BLDV-MCNC: 13 MG/DL (ref 6–20)
CALCIUM SERPL-MCNC: 9.6 MG/DL (ref 8.6–10.2)
CHLORIDE BLD-SCNC: 105 MMOL/L (ref 98–107)
CHOLESTEROL, TOTAL: 183 MG/DL (ref 0–199)
CO2: 23 MMOL/L (ref 22–29)
CREAT SERPL-MCNC: 0.7 MG/DL (ref 0.5–1)
GFR AFRICAN AMERICAN: >60
GFR NON-AFRICAN AMERICAN: >60 ML/MIN/1.73
GLUCOSE BLD-MCNC: 104 MG/DL (ref 74–99)
HCT VFR BLD CALC: 48.6 % (ref 34–48)
HDLC SERPL-MCNC: 62 MG/DL
HEMOGLOBIN: 15.3 G/DL (ref 11.5–15.5)
LDL CHOLESTEROL CALCULATED: 90 MG/DL (ref 0–99)
MCH RBC QN AUTO: 27.8 PG (ref 26–35)
MCHC RBC AUTO-ENTMCNC: 31.5 % (ref 32–34.5)
MCV RBC AUTO: 88.4 FL (ref 80–99.9)
PDW BLD-RTO: 13.4 FL (ref 11.5–15)
PLATELET # BLD: 253 E9/L (ref 130–450)
PMV BLD AUTO: 11.7 FL (ref 7–12)
POTASSIUM SERPL-SCNC: 4.4 MMOL/L (ref 3.5–5)
RBC # BLD: 5.5 E12/L (ref 3.5–5.5)
SODIUM BLD-SCNC: 143 MMOL/L (ref 132–146)
TOTAL PROTEIN: 7.5 G/DL (ref 6.4–8.3)
TRIGL SERPL-MCNC: 154 MG/DL (ref 0–149)
TSH SERPL DL<=0.05 MIU/L-ACNC: 1.35 UIU/ML (ref 0.27–4.2)
VLDLC SERPL CALC-MCNC: 31 MG/DL
WBC # BLD: 9.1 E9/L (ref 4.5–11.5)

## 2021-03-12 PROCEDURE — 99214 OFFICE O/P EST MOD 30 MIN: CPT | Performed by: FAMILY MEDICINE

## 2021-03-12 RX ORDER — DOXYCYCLINE HYCLATE 100 MG
100 TABLET ORAL 2 TIMES DAILY
Qty: 20 TABLET | Refills: 0 | Status: SHIPPED | OUTPATIENT
Start: 2021-03-12 | End: 2021-03-22

## 2021-03-12 RX ORDER — PREDNISONE 10 MG/1
TABLET ORAL
Qty: 18 TABLET | Refills: 0 | Status: SHIPPED
Start: 2021-03-12 | End: 2021-05-19

## 2021-03-12 ASSESSMENT — ENCOUNTER SYMPTOMS
NAUSEA: 0
COLOR CHANGE: 0
EYE PAIN: 0
WHEEZING: 0
BLOOD IN STOOL: 0
SORE THROAT: 0
CHEST TIGHTNESS: 0
EYE REDNESS: 0
CONSTIPATION: 0
SINUS PRESSURE: 1
VOMITING: 0
APNEA: 0
BACK PAIN: 0
RHINORRHEA: 0
VISUAL CHANGE: 0
ABDOMINAL PAIN: 0
COUGH: 1
EYE ITCHING: 0
DIARRHEA: 0
SINUS COMPLAINT: 1
SHORTNESS OF BREATH: 0

## 2021-03-12 NOTE — PROGRESS NOTES
Chief Complaint:     Chief Complaint   Patient presents with    Sinus Problem     eye pain and congested, x4 days    Arthritis    Hypertension    Generalized Body Aches         Sinus Problem  This is a new problem. The current episode started in the past 7 days. The problem is unchanged. There has been no fever. The pain is mild. Associated symptoms include congestion, coughing, headaches and sinus pressure. Pertinent negatives include no chills, ear pain, neck pain, shortness of breath or sore throat. Past treatments include nothing. The treatment provided no relief. Hypertension  This is a chronic problem. The current episode started more than 1 year ago. The problem has been waxing and waning since onset. The problem is uncontrolled. Associated symptoms include headaches. Pertinent negatives include no chest pain, neck pain, palpitations or shortness of breath. There are no associated agents to hypertension. Risk factors for coronary artery disease include stress, obesity and post-menopausal state. Past treatments include angiotensin blockers, beta blockers and calcium channel blockers. The current treatment provides moderate improvement. There is no history of CVA or PVD. There is no history of renovascular disease, sleep apnea or a thyroid problem. Generalized Body Aches  This is a recurrent problem. The current episode started more than 1 month ago. The problem occurs daily. The problem has been waxing and waning. Associated symptoms include arthralgias, congestion, coughing, headaches and myalgias. Pertinent negatives include no abdominal pain, chest pain, chills, fatigue, fever, nausea, neck pain, numbness, rash, sore throat, visual change, vomiting or weakness. Nothing aggravates the symptoms. She has tried nothing for the symptoms. The treatment provided no relief. Fatigue  This is a new problem. The current episode started more than 1 month ago. The problem occurs intermittently.  The problem has been waxing and waning. Associated symptoms include arthralgias, congestion, coughing, headaches and myalgias. Pertinent negatives include no abdominal pain, chest pain, chills, fatigue, fever, nausea, neck pain, numbness, rash, sore throat, visual change, vomiting or weakness. Nothing aggravates the symptoms. She has tried nothing for the symptoms. The treatment provided no relief. Knee Pain   The incident occurred more than 1 week ago. The incident occurred at home. There was no injury mechanism. The pain is present in the left knee. The quality of the pain is described as aching. The pain is moderate. The pain has been fluctuating since onset. Associated symptoms include an inability to bear weight. Pertinent negatives include no numbness or tingling. She reports no foreign bodies present. The symptoms are aggravated by movement, palpation and weight bearing. She has tried nothing for the symptoms. The treatment provided no relief. Foot Pain   This is a recurrent problem. The current episode started more than 1 month ago. The problem occurs intermittently. The problem has been waxing and waning. Associated symptoms include an inability to bear weight. Pertinent negatives include no fever, numbness or tingling. Nothing aggravates the symptoms. She has tried nothing for the symptoms. The treatment provided no relief.        Patient Active Problem List   Diagnosis    Essential hypertension    Myalgia    Fatigue    Primary osteoarthritis of left knee    Primary osteoarthritis of both hips    Polyarthritis    BELEN positive    Raised antibody titer    Chronic left shoulder pain    Current moderate episode of major depressive disorder without prior episode (HCC)    Heel spur, left    Class 2 severe obesity due to excess calories with serious comorbidity and body mass index (BMI) of 39.0 to 39.9 in adult [facility])       Past Medical History:   Diagnosis Date    Essential hypertension 5/22/2017       History reviewed. No pertinent surgical history. Current Outpatient Medications   Medication Sig Dispense Refill    doxycycline hyclate (VIBRA-TABS) 100 MG tablet Take 1 tablet by mouth 2 times daily for 10 days 20 tablet 0    predniSONE (DELTASONE) 10 MG tablet 30mg daily x3 days, then 20mg daily x3 days, then 10mg daily x3 days 18 tablet 0    diclofenac sodium (VOLTAREN) 1 % GEL Apply 4 g topically 2 times daily 200 g 2    metoprolol tartrate (LOPRESSOR) 50 MG tablet TAKE ONE TABLET BY MOUTH TWO TIMES A DAY 60 tablet 0    busPIRone (BUSPAR) 5 MG tablet TAKE ONE TABLET BY MOUTH TWO TIMES A DAY 60 tablet 0    buPROPion (WELLBUTRIN XL) 150 MG extended release tablet TAKE ONE TABLET BY MOUTH EVERY MORNING 30 tablet 0    losartan (COZAAR) 100 MG tablet TAKE ONE TABLET BY MOUTH EVERY DAY 30 tablet 0    amLODIPine (NORVASC) 10 MG tablet TAKE ONE TABLET BY MOUTH EVERY DAY 30 tablet 0    cetirizine (ZYRTEC ALLERGY) 10 MG tablet Take 10 mg by mouth daily      aspirin 81 MG tablet Take 81 mg by mouth daily      Potassium 99 MG TABS Take 99 mg by mouth       No current facility-administered medications for this visit.         No Known Allergies    Social History     Socioeconomic History    Marital status:      Spouse name: None    Number of children: None    Years of education: None    Highest education level: None   Occupational History     Employer: [employer]   Social Needs    Financial resource strain: None    Food insecurity     Worry: None     Inability: None    Transportation needs     Medical: None     Non-medical: None   Tobacco Use    Smoking status: Former Smoker     Packs/day: 0.50     Years: 35.00     Pack years: 17.50     Quit date: 2009     Years since quittin.8    Smokeless tobacco: Never Used   Substance and Sexual Activity    Alcohol use: Yes     Frequency: 2-3 times a week     Drinks per session: 3 or 4    Drug use: Never    Sexual activity: Not Currently     Partners: Male   Lifestyle    Physical activity     Days per week: None     Minutes per session: None    Stress: None   Relationships    Social connections     Talks on phone: None     Gets together: None     Attends Bahai service: None     Active member of club or organization: None     Attends meetings of clubs or organizations: None     Relationship status: None    Intimate partner violence     Fear of current or ex partner: None     Emotionally abused: None     Physically abused: None     Forced sexual activity: None   Other Topics Concern    None   Social History Narrative    None       History reviewed. No pertinent family history. Review of Systems   Constitutional: Negative for activity change, appetite change, chills, fatigue and fever. HENT: Positive for congestion and sinus pressure. Negative for ear pain, hearing loss, nosebleeds, rhinorrhea and sore throat. Eyes: Negative for pain, redness, itching and visual disturbance. Respiratory: Positive for cough. Negative for apnea, chest tightness, shortness of breath and wheezing. Cardiovascular: Negative for chest pain, palpitations and leg swelling. Gastrointestinal: Negative for abdominal pain, blood in stool, constipation, diarrhea, nausea and vomiting. Endocrine: Negative. Genitourinary: Negative for decreased urine volume, difficulty urinating, dysuria, frequency, hematuria and urgency. Musculoskeletal: Positive for arthralgias and myalgias. Negative for back pain, gait problem and neck pain. Skin: Negative for color change and rash. Allergic/Immunologic: Negative for environmental allergies and food allergies. Neurological: Positive for headaches. Negative for dizziness, tingling, weakness, light-headedness and numbness. Hematological: Negative for adenopathy. Does not bruise/bleed easily. Psychiatric/Behavioral: Negative for behavioral problems, dysphoric mood and sleep disturbance.  The patient is not nervous/anxious and is not hyperactive. All other systems reviewed and are negative. /70   Pulse 85   Temp 97.6 °F (36.4 °C)   Resp 16   Ht 5' 4\" (1.626 m)   Wt 230 lb (104.3 kg)   SpO2 98%   BMI 39.48 kg/m²     Physical Exam  Vitals signs and nursing note reviewed. Constitutional:       General: She is not in acute distress. Appearance: Normal appearance. She is well-developed. HENT:      Head: Normocephalic and atraumatic. Right Ear: Hearing, tympanic membrane and external ear normal. No tenderness. No middle ear effusion. Left Ear: Hearing, tympanic membrane and external ear normal. No tenderness. No middle ear effusion. Nose: Nose normal. No congestion or rhinorrhea. Right Turbinates: Not enlarged. Left Turbinates: Not enlarged. Mouth/Throat:      Mouth: Mucous membranes are moist.      Tongue: No lesions. Pharynx: Oropharynx is clear. No oropharyngeal exudate or posterior oropharyngeal erythema. Eyes:      General: No scleral icterus. Conjunctiva/sclera: Conjunctivae normal.      Pupils: Pupils are equal, round, and reactive to light. Neck:      Musculoskeletal: Normal range of motion and neck supple. No neck rigidity or muscular tenderness. Thyroid: No thyromegaly. Cardiovascular:      Rate and Rhythm: Normal rate and regular rhythm. Heart sounds: Normal heart sounds. No murmur. Pulmonary:      Effort: Pulmonary effort is normal. No respiratory distress. Breath sounds: Normal breath sounds. No wheezing or rales. Abdominal:      General: Bowel sounds are normal. There is no distension. Palpations: Abdomen is soft. Tenderness: There is no abdominal tenderness. Musculoskeletal: Normal range of motion. General: No tenderness. Lymphadenopathy:      Cervical: No cervical adenopathy. Skin:     General: Skin is warm and dry. Findings: No erythema or rash. Neurological:      General: No focal deficit present. Mental Status: She is alert and oriented to person, place, and time. Cranial Nerves: No cranial nerve deficit. Deep Tendon Reflexes: Reflexes are normal and symmetric. Reflexes normal.   Psychiatric:         Mood and Affect: Mood normal.                                 ASSESSMENT/PLAN:    Patient Active Problem List   Diagnosis    Essential hypertension    Myalgia    Fatigue    Primary osteoarthritis of left knee    Primary osteoarthritis of both hips    Polyarthritis    BELEN positive    Raised antibody titer    Chronic left shoulder pain    Current moderate episode of major depressive disorder without prior episode (HCC)    Heel spur, left    Class 2 severe obesity due to excess calories with serious comorbidity and body mass index (BMI) of 39.0 to 39.9 in [facility])       Lilo Boston was seen today for sinus problem, arthritis, hypertension and generalized body aches. Diagnoses and all orders for this visit:    Essential hypertension  -     CBC; Future  -     Comprehensive Metabolic Panel; Future  -     Lipid Panel; Future  -     TSH without Reflex; Future    Primary osteoarthritis of left knee  -     XR KNEE LEFT (MIN 4 VIEWS); Future    Current moderate episode of major depressive disorder without prior episode (HCC)    Fatigue, unspecified type    Heel spur, left  -     XR FOOT LEFT (MIN 3 VIEWS); Future    Class 2 severe obesity due to excess calories with serious comorbidity and body mass index (BMI) of 39.0 to 39.9 in [facility])    Other orders  -     doxycycline hyclate (VIBRA-TABS) 100 MG tablet; Take 1 tablet by mouth 2 times daily for 10 days  -     predniSONE (DELTASONE) 10 MG tablet; 30mg daily x3 days, then 20mg daily x3 days, then 10mg daily x3 days          Return in about 6 months (around 9/12/2021) for Follow up HTN, Recheck labs, Recheck Meds. I spent 30 minutes with this patient. I spent greater than 50% of the time counseling this patient.         Vera Dumont,

## 2021-03-13 RX ORDER — METOPROLOL TARTRATE 50 MG/1
TABLET, FILM COATED ORAL
Qty: 60 TABLET | Refills: 0 | Status: SHIPPED
Start: 2021-03-13 | End: 2021-04-05 | Stop reason: SDUPTHER

## 2021-03-13 RX ORDER — BUSPIRONE HYDROCHLORIDE 5 MG/1
TABLET ORAL
Qty: 60 TABLET | Refills: 0 | Status: SHIPPED
Start: 2021-03-13 | End: 2021-04-05 | Stop reason: SDUPTHER

## 2021-03-13 RX ORDER — BUPROPION HYDROCHLORIDE 150 MG/1
TABLET ORAL
Qty: 30 TABLET | Refills: 0 | Status: SHIPPED
Start: 2021-03-13 | End: 2021-04-05 | Stop reason: SDUPTHER

## 2021-03-13 RX ORDER — LOSARTAN POTASSIUM 100 MG/1
TABLET ORAL
Qty: 30 TABLET | Refills: 0 | Status: SHIPPED
Start: 2021-03-13 | End: 2021-04-05 | Stop reason: SDUPTHER

## 2021-03-13 RX ORDER — AMLODIPINE BESYLATE 10 MG/1
TABLET ORAL
Qty: 30 TABLET | Refills: 0 | Status: SHIPPED
Start: 2021-03-13 | End: 2021-04-05 | Stop reason: SDUPTHER

## 2021-04-05 RX ORDER — BUPROPION HYDROCHLORIDE 150 MG/1
150 TABLET ORAL EVERY MORNING
Qty: 90 TABLET | Refills: 1 | Status: SHIPPED
Start: 2021-04-05 | End: 2021-10-06

## 2021-04-05 RX ORDER — AMLODIPINE BESYLATE 10 MG/1
10 TABLET ORAL DAILY
Qty: 90 TABLET | Refills: 1 | Status: SHIPPED
Start: 2021-04-05 | End: 2021-10-06

## 2021-04-05 RX ORDER — LOSARTAN POTASSIUM 100 MG/1
100 TABLET ORAL DAILY
Qty: 90 TABLET | Refills: 1 | Status: SHIPPED
Start: 2021-04-05 | End: 2021-10-06

## 2021-04-05 RX ORDER — BUSPIRONE HYDROCHLORIDE 5 MG/1
5 TABLET ORAL 2 TIMES DAILY
Qty: 180 TABLET | Refills: 1 | Status: SHIPPED
Start: 2021-04-05 | End: 2021-10-06

## 2021-04-05 RX ORDER — METOPROLOL TARTRATE 50 MG/1
50 TABLET, FILM COATED ORAL 2 TIMES DAILY
Qty: 180 TABLET | Refills: 1 | Status: SHIPPED
Start: 2021-04-05 | End: 2021-10-06

## 2021-05-19 ENCOUNTER — OFFICE VISIT (OUTPATIENT)
Dept: PODIATRY | Age: 56
End: 2021-05-19
Payer: COMMERCIAL

## 2021-05-19 VITALS
TEMPERATURE: 98.2 F | WEIGHT: 225 LBS | SYSTOLIC BLOOD PRESSURE: 126 MMHG | DIASTOLIC BLOOD PRESSURE: 78 MMHG | BODY MASS INDEX: 38.62 KG/M2

## 2021-05-19 DIAGNOSIS — M20.22 HALLUX RIGIDUS OF LEFT FOOT: Primary | ICD-10-CM

## 2021-05-19 DIAGNOSIS — M79.675 PAIN IN LEFT TOE(S): ICD-10-CM

## 2021-05-19 DIAGNOSIS — M77.32 CALCANEAL SPUR OF BOTH FEET: ICD-10-CM

## 2021-05-19 DIAGNOSIS — M72.2 PLANTAR FASCIAL FIBROMATOSIS: ICD-10-CM

## 2021-05-19 DIAGNOSIS — M77.31 CALCANEAL SPUR OF BOTH FEET: ICD-10-CM

## 2021-05-19 PROCEDURE — 99203 OFFICE O/P NEW LOW 30 MIN: CPT | Performed by: PODIATRIST

## 2021-05-19 PROCEDURE — 20550 NJX 1 TENDON SHEATH/LIGAMENT: CPT | Performed by: PODIATRIST

## 2021-05-19 RX ORDER — BETAMETHASONE SODIUM PHOSPHATE AND BETAMETHASONE ACETATE 3; 3 MG/ML; MG/ML
6 INJECTION, SUSPENSION INTRA-ARTICULAR; INTRALESIONAL; INTRAMUSCULAR; SOFT TISSUE ONCE
Status: COMPLETED | OUTPATIENT
Start: 2021-05-19 | End: 2021-05-19

## 2021-05-19 RX ORDER — DICLOFENAC POTASSIUM 50 MG/1
50 TABLET, FILM COATED ORAL 2 TIMES DAILY
Qty: 90 TABLET | Refills: 1 | Status: SHIPPED
Start: 2021-05-19 | End: 2021-08-30 | Stop reason: SDUPTHER

## 2021-05-19 RX ORDER — BUPIVACAINE HYDROCHLORIDE 5 MG/ML
1 INJECTION, SOLUTION PERINEURAL ONCE
Status: COMPLETED | OUTPATIENT
Start: 2021-05-19 | End: 2021-05-19

## 2021-05-19 RX ADMIN — BUPIVACAINE HYDROCHLORIDE 5 MG: 5 INJECTION, SOLUTION PERINEURAL at 15:59

## 2021-05-19 RX ADMIN — BETAMETHASONE SODIUM PHOSPHATE AND BETAMETHASONE ACETATE 6 MG: 3; 3 INJECTION, SUSPENSION INTRA-ARTICULAR; INTRALESIONAL; INTRAMUSCULAR; SOFT TISSUE at 15:58

## 2021-05-19 NOTE — PROGRESS NOTES
[facility] PODIATRY  [address] [facility]  [place] [address]   Dept: [phone]  Dept Fax: [phone]    NEW PATIENT PROGRESS NOTE  Date of patient's visit: 5/19/2021  Patient's Name:  Michi Reynaga YOB: 1965            Patient Care Team:  Marcelo Pascal DO as PCP - General (Family Medicine)  Marcelo Pascal DO as PCP - [facility]        Chief Complaint   Patient presents with   Alvia Brunner Doctor     referred herself     Foot Pain     left heel pain over three mos. no trauma noted had xrays in March she wears a heel cup which helps she walks alot on her feet        HPI  HPI:   Michi Reynaga is a 60 y.o. female who presents to the office today complaining of left heel pain. This new patient is seen today regarding left heel pain. Patient states the heel has been bothering her more in the mornings and after she standing. Patient works at [employer]. Patient states no trauma noted. Patient states several months patient has not tried anything other than icing. Patient has taken anti-inflammatories. Patient did have an x-ray. Danae Maid No Known Allergies    Past Medical History:   Diagnosis Date    Essential hypertension 5/22/2017       Prior to Admission medications    Medication Sig Start Date End Date Taking?  Authorizing Provider   diclofenac (CATAFLAM) 50 MG tablet Take 1 tablet by mouth 2 times daily 5/19/21  Yes Levi Patterson DPM   amLODIPine (NORVASC) 10 MG tablet Take 1 tablet by mouth daily 4/5/21  Yes Marcelo Pascal DO   losartan (COZAAR) 100 MG tablet Take 1 tablet by mouth daily 4/5/21  Yes Marcelo Pascal DO   metoprolol tartrate (LOPRESSOR) 50 MG tablet Take 1 tablet by mouth 2 times daily 4/5/21  Yes Marcelo Pascal DO   busPIRone (BUSPAR) 5 MG tablet Take 1 tablet by mouth 2 times daily 4/5/21  Yes Jignesh Ceja, DO   cetirizine (ZYRTEC ALLERGY) 10 MG tablet Take 10 mg by mouth daily   Yes Historical Provider, MD   aspirin 81 MG tablet Take 81 mg by mouth daily   Yes Historical Provider, MD   buPROPion (WELLBUTRIN XL) 150 MG extended release tablet Take 1 tablet by mouth every morning  Patient not taking: Reported on 2021   Marcelo Pascal, DO   diclofenac sodium (VOLTAREN) 1 % GEL Apply 4 g topically 2 times daily  Patient not taking: Reported on 2021   Jignesh F Brenna, DO   buPROPion (WELLBUTRIN XL) 150 MG extended release tablet TAKE ONE TABLET BY MOUTH EVERY MORNING 12/15/20   [facility] Brenna, DO   losartan (COZAAR) 100 MG tablet TAKE ONE TABLET BY MOUTH EVERY DAY 12/15/20   Jignesh Lebron,    metoprolol tartrate (LOPRESSOR) 50 MG tablet TAKE ONE TABLET BY MOUTH TWO TIMES A DAY 12/15/20   [facility] Brenna, DO   amLODIPine (NORVASC) 10 MG tablet TAKE ONE TABLET BY MOUTH EVERY DAY 12/15/20   Jignesh Lebron DO   busPIRone (BUSPAR) 5 MG tablet TAKE ONE TABLET BY MOUTH TWO TIMES A DAY 12/15/20   [facility] Brenna,    Potassium 99 MG TABS Take 99 mg by mouth  Patient not taking: Reported on 2021    Historical Provider, MD       No past surgical history on file. No family history on file. Social History     Tobacco Use    Smoking status: Former Smoker     Packs/day: 0.50     Years: 35.00     Pack years: 17.50     Quit date: 2009     Years since quittin.0    Smokeless tobacco: Never Used   Substance Use Topics    Alcohol use: Yes       Review of Systems    Review of Systems:   History obtained from chart review and the patient  General ROS: negative for - chills, fatigue, fever, night sweats or weight gain  Constitutional: Negative for chills, diaphoresis, fatigue, fever and unexpected weight change. Musculoskeletal: Positive for . Neurological ROS: negative for - behavioral changes, confusion, headaches or seizures. Negative for weakness and numbness. Dermatological ROS: negative for - mole changes, rash  Cardiovascular: Negative for leg swelling. Gastrointestinal: Negative for constipation, diarrhea, nausea and vomiting. Lower Extremity Physical Examination:   Vitals:   Vitals:    05/19/21 1508   BP: 126/78   Temp: 98.2 °F (36.8 °C)        Foot Exam    General  General Appearance: appears stated age and healthy   Orientation: alert and oriented to person, place, and time       Left Foot/Ankle      Inspection and Palpation  Tenderness: bony tenderness, great toe metatarsophalangeal joint, plantar fascia and calcaneus tenderness   Swelling: great toe metatarsophalangeal joint     Neurovascular  Dorsalis pedis: 3+  Posterior tibial: 3+  Saphenous nerve sensation: normal  Tibial nerve sensation: normal  Superficial peroneal nerve sensation: normal  Deep peroneal nerve sensation: normal  Sural nerve sensation: normal  Achilles reflex: 2+  Babinski reflex: 2+    Muscle Strength  Ankle dorsiflexion: 5  Ankle plantar flexion: 5  Ankle inversion: 5  Ankle eversion: 5  Great toe extension: 5  Great toe flexion: 5    Range of Motion    Normal left ankle ROM  Passive  1st MTP extension: pain  1st MTP flexion: pain    Active  1st MTP extension: 5, pain  1st MTP flexion: pain          Left Ankle Exam     Range of Motion   The patient has normal left ankle ROM. Muscle Strength   The patient has normal left ankle strength. Dorsiflexion:  5/5   Plantar flexion:  5/5             General: AAO x 3 in NAD. Dermatologic Exam:  Skin lesion/ulceration Absent . Skin No rashes or nodules noted. .   Musculoskeletal:     1st MPJ ROM within normal limits, Bilateral.    Ankle ROM within normal limits,Bilateral.   HEEL PAIN PAIN WITH PALPATION TO THE INFRA ASPECT OF THE LEFT HEEL. PAIN WITH PALPATION AT THE INSERTION OF THE PLANTAR FASCIAL MUSCLE AND CALCANEUS. TARSAL TUNNEL PAIN negative tarsal tunnel syndrome negative Tinel's sign  Vascular: +2/4 DP and PT    Radiographs:  3 views inferior posterior calcaneal spur hallux rigidus noted.   Foot/ankle:   No results found. Asessment: Patient is a 60 y.o. female with:    Diagnosis Orders   1. Hallux rigidus of left foot     2. Pain in left toe(s)     3. Calcaneal spur of both feet     4. Plantar fascial fibromatosis         Plan: Patient examined and evaluated. Current condition and treatment options discussed in detail. Discussed conservative and surgical options with the patient. Treatment options today consisted of verbal and written instructions given to patient. Treatment today consisted of reviewing the x-ray with the patient. We did a cortisone shot left heel. We discussed surgical correction to the hallux rigidus which would be a fusion in the future. Patient is to ice stretch. Night splint was dispensed. Contact office with any questions/problems/concerns. RTC in 3week(s). .heel  Potential risks, complications, alternative treatments, and procedure prognosis were explained to the patient. Verbal informed consent was obtained from the patient. Chlora prep preparation was performed over plantar fascia. 1 mL of 0.5% Marcaine plain and 1 cc Celestone Soluspan left heel injected in standard medial approach plantar fascia. Patient tolerated steroid injection well. Band-Aid applied. The patient was educated on a possible steroid flare and the use of ice with frozen water bottle 10 minutes each night discussed. Continue passive and active range of motion stretches and strengthening bilateral plantar fascia and Achilles tendons. The patient was dispensed a night splint. It is medically necessary and within the standard of care for the patient's diagnosis. Its purpose is to stretch the gastrocnemius complex as well as the plantar fascial ligament. This in turn allows for a decrease in the amount of equinus deformity present. It was fitted and adjusted by myself. The patient was instructed on its proper application and use.  The patient was also instructed to watch for areas of rubbing, irritation,

## 2021-06-16 ENCOUNTER — OFFICE VISIT (OUTPATIENT)
Dept: PODIATRY | Age: 56
End: 2021-06-16
Payer: COMMERCIAL

## 2021-06-16 VITALS — TEMPERATURE: 97.2 F | DIASTOLIC BLOOD PRESSURE: 74 MMHG | SYSTOLIC BLOOD PRESSURE: 118 MMHG

## 2021-06-16 DIAGNOSIS — M20.22 HALLUX RIGIDUS OF LEFT FOOT: Primary | ICD-10-CM

## 2021-06-16 DIAGNOSIS — M77.31 CALCANEAL SPUR OF BOTH FEET: ICD-10-CM

## 2021-06-16 DIAGNOSIS — M77.32 CALCANEAL SPUR OF BOTH FEET: ICD-10-CM

## 2021-06-16 DIAGNOSIS — M72.2 PLANTAR FASCIAL FIBROMATOSIS: ICD-10-CM

## 2021-06-16 DIAGNOSIS — M79.675 PAIN IN LEFT TOE(S): ICD-10-CM

## 2021-06-16 PROCEDURE — 99213 OFFICE O/P EST LOW 20 MIN: CPT | Performed by: PODIATRIST

## 2021-06-16 NOTE — PROGRESS NOTES
21  Angel Colin : 1965 Sex: female  Age: 60 y.o. Patient was referred by Alexy Harvey DO    Chief Complaint   Patient presents with    Foot Pain     left heel pain had one inj        SUBJECTIVE patient is seen for follow-up of left heel pain patient is on Cataflam had 1 injection icing stretching she is doing excellent 100% better  HPI  Review of Systems  Const: Denies constitutional symptoms  Musculo: Denies symptoms other than stated above  Skin: Denies symptoms other than stated above       Current Outpatient Medications:     diclofenac (CATAFLAM) 50 MG tablet, Take 1 tablet by mouth 2 times daily, Disp: 90 tablet, Rfl: 1    buPROPion (WELLBUTRIN XL) 150 MG extended release tablet, Take 1 tablet by mouth every morning, Disp: 90 tablet, Rfl: 1    amLODIPine (NORVASC) 10 MG tablet, Take 1 tablet by mouth daily, Disp: 90 tablet, Rfl: 1    losartan (COZAAR) 100 MG tablet, Take 1 tablet by mouth daily, Disp: 90 tablet, Rfl: 1    metoprolol tartrate (LOPRESSOR) 50 MG tablet, Take 1 tablet by mouth 2 times daily, Disp: 180 tablet, Rfl: 1    busPIRone (BUSPAR) 5 MG tablet, Take 1 tablet by mouth 2 times daily, Disp: 180 tablet, Rfl: 1    diclofenac sodium (VOLTAREN) 1 % GEL, Apply 4 g topically 2 times daily, Disp: 200 g, Rfl: 2    cetirizine (ZYRTEC ALLERGY) 10 MG tablet, Take 10 mg by mouth daily, Disp: , Rfl:     aspirin 81 MG tablet, Take 81 mg by mouth daily, Disp: , Rfl:     Potassium 99 MG TABS, Take 99 mg by mouth , Disp: , Rfl:   No Known Allergies    Past Medical History:   Diagnosis Date    Essential hypertension 2017     No past surgical history on file. No family history on file.   Social History     Socioeconomic History    Marital status:      Spouse name: Not on file    Number of children: Not on file    Years of education: Not on file    Highest education level: Not on file   Occupational History     Employer: [employer]   Tobacco Use    Smoking nerve sensation: normal  Deep peroneal nerve sensation: normal  Sural nerve sensation: normal  Achilles reflex: 2+  Babinski reflex: 2+      Left Foot/Ankle      Inspection and Palpation  Ecchymosis: none  Tenderness: none   Swelling: none   Skin Exam: skin intact; Neurovascular  Dorsalis pedis: 3+  Posterior tibial: 3+  Saphenous nerve sensation: normal  Tibial nerve sensation: normal  Superficial peroneal nerve sensation: normal  Deep peroneal nerve sensation: normal  Sural nerve sensation: normal  Achilles reflex: 2+  Babinski reflex: 2+             Ortho Exam    Vascular: pulses  dp  pt negative palpable  Capillary Refill Time:   Hair growth  Skin:    Edema:    Neurologic:      Musculoskeletal/ Orthopedic examination:    NAIL negative  Web space negative  Derm: Negative        Assessment and Plan: Patient is to continue icing stretching comfortable shoes she is to use Cataflam as needed reappoint as needed  Katty Rivera was seen today for foot pain. Diagnoses and all orders for this visit:    Hallux rigidus of left foot    Pain in left toe(s)    Calcaneal spur of both feet    Plantar fascial fibromatosis        Return if symptoms worsen or fail to improve. Seen By:  Lillian Shukla DPM      Document was created using voice recognition software. Note was reviewed, however may contain grammatical errors.

## 2021-08-30 ENCOUNTER — TELEPHONE (OUTPATIENT)
Dept: PODIATRY | Age: 56
End: 2021-08-30

## 2021-08-30 RX ORDER — DICLOFENAC POTASSIUM 50 MG/1
50 TABLET, FILM COATED ORAL 2 TIMES DAILY
Qty: 90 TABLET | Refills: 1 | Status: SHIPPED
Start: 2021-08-30 | End: 2022-05-31

## 2021-09-14 ENCOUNTER — OFFICE VISIT (OUTPATIENT)
Dept: FAMILY MEDICINE CLINIC | Age: 56
End: 2021-09-14
Payer: COMMERCIAL

## 2021-09-14 VITALS
DIASTOLIC BLOOD PRESSURE: 80 MMHG | SYSTOLIC BLOOD PRESSURE: 120 MMHG | BODY MASS INDEX: 39.31 KG/M2 | WEIGHT: 229 LBS | TEMPERATURE: 97.1 F | HEART RATE: 70 BPM | OXYGEN SATURATION: 98 %

## 2021-09-14 DIAGNOSIS — R05.9 COUGH: Primary | ICD-10-CM

## 2021-09-14 DIAGNOSIS — Z20.822 CLOSE EXPOSURE TO COVID-19 VIRUS: ICD-10-CM

## 2021-09-14 PROCEDURE — 99203 OFFICE O/P NEW LOW 30 MIN: CPT | Performed by: PHYSICIAN ASSISTANT

## 2021-09-14 ASSESSMENT — ENCOUNTER SYMPTOMS
COUGH: 1
PHOTOPHOBIA: 0
VOMITING: 0
SORE THROAT: 1
ABDOMINAL PAIN: 0
BACK PAIN: 0
DIARRHEA: 0
SHORTNESS OF BREATH: 0
NAUSEA: 0

## 2021-09-14 NOTE — PROGRESS NOTES
Outpatient Medications:     diclofenac (CATAFLAM) 50 MG tablet, Take 1 tablet by mouth 2 times daily, Disp: 90 tablet, Rfl: 1    buPROPion (WELLBUTRIN XL) 150 MG extended release tablet, Take 1 tablet by mouth every morning, Disp: 90 tablet, Rfl: 1    amLODIPine (NORVASC) 10 MG tablet, Take 1 tablet by mouth daily, Disp: 90 tablet, Rfl: 1    losartan (COZAAR) 100 MG tablet, Take 1 tablet by mouth daily, Disp: 90 tablet, Rfl: 1    metoprolol tartrate (LOPRESSOR) 50 MG tablet, Take 1 tablet by mouth 2 times daily, Disp: 180 tablet, Rfl: 1    busPIRone (BUSPAR) 5 MG tablet, Take 1 tablet by mouth 2 times daily, Disp: 180 tablet, Rfl: 1    diclofenac sodium (VOLTAREN) 1 % GEL, Apply 4 g topically 2 times daily, Disp: 200 g, Rfl: 2    cetirizine (ZYRTEC ALLERGY) 10 MG tablet, Take 10 mg by mouth daily, Disp: , Rfl:     aspirin 81 MG tablet, Take 81 mg by mouth daily, Disp: , Rfl:     Potassium 99 MG TABS, Take 99 mg by mouth , Disp: , Rfl:     Allergies:   No Known Allergies    Social History:     Social History     Tobacco Use    Smoking status: Former Smoker     Packs/day: 0.50     Years: 35.00     Pack years: 17.50     Quit date: 2009     Years since quittin.3    Smokeless tobacco: Never Used   Vaping Use    Vaping Use: Never used   Substance Use Topics    Alcohol use: Yes    Drug use: Never       Patient lives at home. Physical Exam:     Vitals:    21 1414   BP: 120/80   Pulse: 70   Temp: 97.1 °F (36.2 °C)   TempSrc: Temporal   SpO2: 98%   Weight: 229 lb (103.9 kg)       Exam:  Physical Exam  Vitals and nursing note reviewed. Constitutional:       General: She is not in acute distress. Appearance: She is well-developed. HENT:      Head: Normocephalic and atraumatic. Right Ear: Tympanic membrane normal.      Left Ear: Tympanic membrane normal.      Nose: Nose normal.      Mouth/Throat:      Mouth: Mucous membranes are moist.      Pharynx: Oropharynx is clear.    Eyes: Conjunctiva/sclera: Conjunctivae normal.      Pupils: Pupils are equal, round, and reactive to light. Cardiovascular:      Rate and Rhythm: Normal rate and regular rhythm. Pulmonary:      Effort: Pulmonary effort is normal. No respiratory distress. Breath sounds: Normal breath sounds. Abdominal:      General: Bowel sounds are normal.      Palpations: Abdomen is soft. Tenderness: There is no abdominal tenderness. Musculoskeletal:         General: Normal range of motion. Cervical back: Normal range of motion. No rigidity. Lymphadenopathy:      Cervical: No cervical adenopathy. Skin:     General: Skin is warm and dry. Neurological:      General: No focal deficit present. Mental Status: She is alert and oriented to person, place, and time. Psychiatric:         Mood and Affect: Mood normal.         Behavior: Behavior normal.         Thought Content: Thought content normal.         Judgment: Judgment normal.           Testing:           Medical Decision Making:     Patient upon arrival did not appear toxic or lethargic. Vital signs were reviewed. Past medical history reviewed. Allergies reviewed. Medications reviewed. Patient is presenting with the above complaint of cough and congestion. COVID-19 test is pending. Patient will quarantine until the test results are back. Patient was educated on the likely viral etiology and self-limiting nature of her illness. She is to use over-the-counter medications such as Tylenol and Motrin. She may use over-the-counter decongestants as well. She is to drink plenty of fluids. She will be contacted with the results of her COVID-19 test.  Patient will follow up with her PCP in the next few days if she is not feeling any better. She is to return or go to the emergency department for worsening symptoms. Clinical Impression:   Sydnie Coello was seen today for other and pharyngitis.     Diagnoses and all orders for this visit:    Cough  -     COVID-19 Ambulatory; Future    Close exposure to COVID-19 virus  -     COVID-19 Ambulatory; Future        The patient is to call for any concerns or return if any of the signs or symptoms worsen. The patient is to follow-up with PCP in the next 2-3 days for repeat evaluation repeat assessment or go directly to the emergency department. SIGNATURE: Ashley Marx PA-C

## 2021-10-06 RX ORDER — BUSPIRONE HYDROCHLORIDE 5 MG/1
TABLET ORAL
Qty: 180 TABLET | Refills: 0 | Status: SHIPPED
Start: 2021-10-06 | End: 2022-01-04

## 2021-10-06 RX ORDER — LOSARTAN POTASSIUM 100 MG/1
TABLET ORAL
Qty: 90 TABLET | Refills: 0 | Status: SHIPPED
Start: 2021-10-06 | End: 2022-01-04

## 2021-10-06 RX ORDER — AMLODIPINE BESYLATE 10 MG/1
TABLET ORAL
Qty: 90 TABLET | Refills: 0 | Status: SHIPPED
Start: 2021-10-06 | End: 2022-01-04

## 2021-10-06 RX ORDER — METOPROLOL TARTRATE 50 MG/1
TABLET, FILM COATED ORAL
Qty: 180 TABLET | Refills: 0 | Status: SHIPPED
Start: 2021-10-06 | End: 2022-01-04

## 2021-10-06 RX ORDER — BUPROPION HYDROCHLORIDE 150 MG/1
TABLET ORAL
Qty: 90 TABLET | Refills: 0 | Status: SHIPPED
Start: 2021-10-06 | End: 2022-01-04

## 2021-12-14 ENCOUNTER — OFFICE VISIT (OUTPATIENT)
Dept: PRIMARY CARE CLINIC | Age: 56
End: 2021-12-14
Payer: COMMERCIAL

## 2021-12-14 VITALS
HEIGHT: 64 IN | WEIGHT: 239 LBS | BODY MASS INDEX: 40.8 KG/M2 | OXYGEN SATURATION: 97 % | SYSTOLIC BLOOD PRESSURE: 122 MMHG | HEART RATE: 76 BPM | TEMPERATURE: 98.1 F | DIASTOLIC BLOOD PRESSURE: 80 MMHG

## 2021-12-14 DIAGNOSIS — M17.0 PRIMARY OSTEOARTHRITIS OF BOTH KNEES: ICD-10-CM

## 2021-12-14 DIAGNOSIS — M13.0 POLYARTHRITIS: ICD-10-CM

## 2021-12-14 DIAGNOSIS — M79.10 MYALGIA: ICD-10-CM

## 2021-12-14 DIAGNOSIS — M16.0 PRIMARY OSTEOARTHRITIS OF BOTH HIPS: Primary | ICD-10-CM

## 2021-12-14 DIAGNOSIS — R76.8 ANA POSITIVE: ICD-10-CM

## 2021-12-14 PROCEDURE — 99214 OFFICE O/P EST MOD 30 MIN: CPT | Performed by: FAMILY MEDICINE

## 2021-12-14 RX ORDER — DICLOFENAC POTASSIUM 50 MG/1
50 TABLET, FILM COATED ORAL 3 TIMES DAILY
Qty: 90 TABLET | Refills: 3 | Status: SHIPPED
Start: 2021-12-14 | End: 2022-02-10

## 2021-12-14 ASSESSMENT — ENCOUNTER SYMPTOMS
VOMITING: 0
BACK PAIN: 0
SINUS PRESSURE: 0
RHINORRHEA: 0
DIARRHEA: 0
CHEST TIGHTNESS: 0
SORE THROAT: 0
EYE ITCHING: 0
COLOR CHANGE: 0
COUGH: 0
BLOOD IN STOOL: 0
CONSTIPATION: 0
NAUSEA: 0
ABDOMINAL PAIN: 0
APNEA: 0
WHEEZING: 0
EYE REDNESS: 0
SHORTNESS OF BREATH: 0
EYE PAIN: 0

## 2021-12-14 NOTE — PROGRESS NOTES
Chief Complaint:     Chief Complaint   Patient presents with    Leg Pain     Started years ago.  Hip Pain    Toe Pain     heel spurs. Leg Pain   The incident occurred more than 1 week ago. The incident occurred at home. There was no injury mechanism. The pain is present in the left knee, left hip, left foot, left toes, right knee, right hip, right foot and right toes. The quality of the pain is described as aching. The pain is moderate. The pain has been worsening since onset. Associated symptoms include a loss of motion. Pertinent negatives include no numbness. The symptoms are aggravated by movement, palpation and weight bearing. She has tried nothing for the symptoms. The treatment provided no relief. Patient Active Problem List   Diagnosis    Essential hypertension    Myalgia    Fatigue    Primary osteoarthritis of both knees    Primary osteoarthritis of both hips    Polyarthritis    BELEN positive    Raised antibody titer    Chronic left shoulder pain    Current moderate episode of major depressive disorder without prior episode (HCC)    Heel spur, left    Class 2 severe obesity due to excess calories with serious comorbidity and body mass index (BMI) of 39.0 to 39.9 in adult [facility])       Past Medical History:   Diagnosis Date    Essential hypertension 5/22/2017       History reviewed. No pertinent surgical history.     Current Outpatient Medications   Medication Sig Dispense Refill    diclofenac (CATAFLAM) 50 MG tablet Take 1 tablet by mouth 3 times daily 90 tablet 3    buPROPion (WELLBUTRIN XL) 150 MG extended release tablet TAKE 1 TABLET BY MOUTH ONCE DAILY IN THE MORNING 90 tablet 0    busPIRone (BUSPAR) 5 MG tablet Take 1 tablet by mouth twice daily 180 tablet 0    losartan (COZAAR) 100 MG tablet Take 1 tablet by mouth once daily 90 tablet 0    amLODIPine (NORVASC) 10 MG tablet Take 1 tablet by mouth once daily 90 tablet 0    metoprolol tartrate (LOPRESSOR) 50 MG tablet Take 1 tablet by mouth twice daily 180 tablet 0    diclofenac (CATAFLAM) 50 MG tablet Take 1 tablet by mouth 2 times daily 90 tablet 1    diclofenac sodium (VOLTAREN) 1 % GEL Apply 4 g topically 2 times daily 200 g 2    cetirizine (ZYRTEC ALLERGY) 10 MG tablet Take 10 mg by mouth daily      aspirin 81 MG tablet Take 81 mg by mouth daily      Potassium 99 MG TABS Take 99 mg by mouth        No current facility-administered medications for this visit. No Known Allergies    Social History     Socioeconomic History    Marital status:      Spouse name: None    Number of children: None    Years of education: None    Highest education level: None   Occupational History     Employer: [employer]   Tobacco Use    Smoking status: Former Smoker     Packs/day: 0.50     Years: 35.00     Pack years: 17.50     Quit date: 2009     Years since quittin.5    Smokeless tobacco: Never Used   Vaping Use    Vaping Use: Never used   Substance and Sexual Activity    Alcohol use: Yes    Drug use: Never    Sexual activity: Not Currently     Partners: Male   Other Topics Concern    None   Social History Narrative    None     Social Determinants of Health     Financial Resource Strain:     Difficulty of Paying Living Expenses: Not on file   Food Insecurity:     Worried About Running Out of Food in the Last Year: Not on file    Eun of Food in the Last Year: Not on file   Transportation Needs:     Lack of Transportation (Medical): Not on file    Lack of Transportation (Non-Medical):  Not on file   Physical Activity:     Days of Exercise per Week: Not on file    Minutes of Exercise per Session: Not on file   Stress:     Feeling of Stress : Not on file   Social Connections:     Frequency of Communication with Friends and Family: Not on file    Frequency of Social Gatherings with Friends and Family: Not on file    Attends [employer] Services: Not on file   [employer]s or Organizations: Not on file    Attends Club or Organization Meetings: Not on file    Marital Status: Not on file   Intimate Partner Violence:     Fear of Current or Ex-Partner: Not on file    Emotionally Abused: Not on file    Physically Abused: Not on file    Sexually Abused: Not on file   Housing Stability:     Unable to Pay for Housing in the Last Year: Not on file    Number of [place] in the Last Year: Not on file    Unstable Housing in the Last Year: Not on file       History reviewed. No pertinent family history. Review of Systems   Constitutional: Negative for activity change, appetite change, fatigue and fever. HENT: Negative for congestion, ear pain, hearing loss, nosebleeds, rhinorrhea, sinus pressure and sore throat. Eyes: Negative for pain, redness, itching and visual disturbance. Respiratory: Negative for apnea, cough, chest tightness, shortness of breath and wheezing. Cardiovascular: Negative for chest pain, palpitations and leg swelling. Gastrointestinal: Negative for abdominal pain, blood in stool, constipation, diarrhea, nausea and vomiting. Endocrine: Negative. Genitourinary: Negative for decreased urine volume, difficulty urinating, dysuria, frequency, hematuria and urgency. Musculoskeletal: Negative for arthralgias, back pain, gait problem, myalgias and neck pain. Skin: Negative for color change and rash. Allergic/Immunologic: Negative for environmental allergies and food allergies. Neurological: Negative for dizziness, weakness, light-headedness, numbness and headaches. Hematological: Negative for adenopathy. Does not bruise/bleed easily. Psychiatric/Behavioral: Negative for behavioral problems, dysphoric mood and sleep disturbance. The patient is not nervous/anxious and is not hyperactive. All other systems reviewed and are negative.         /80   Pulse 76   Temp 98.1 °F (36.7 °C)   Ht 5' 4\" (1.626 m)   Wt 239 lb (108.4 kg)   SpO2 97% BMI 41.02 kg/m²     Physical Exam  Vitals and nursing note reviewed. Constitutional:       General: She is not in acute distress. Appearance: Normal appearance. She is well-developed. HENT:      Head: Normocephalic and atraumatic. Right Ear: Hearing, tympanic membrane and external ear normal. No tenderness. No middle ear effusion. Left Ear: Hearing, tympanic membrane and external ear normal. No tenderness. No middle ear effusion. Nose: Nose normal. No congestion or rhinorrhea. Right Turbinates: Not enlarged. Left Turbinates: Not enlarged. Mouth/Throat:      Mouth: Mucous membranes are moist.      Tongue: No lesions. Pharynx: Oropharynx is clear. No oropharyngeal exudate or posterior oropharyngeal erythema. Eyes:      General: No scleral icterus. Conjunctiva/sclera: Conjunctivae normal.      Pupils: Pupils are equal, round, and reactive to light. Neck:      Thyroid: No thyromegaly. Cardiovascular:      Rate and Rhythm: Normal rate and regular rhythm. Heart sounds: Normal heart sounds. No murmur heard. Pulmonary:      Effort: Pulmonary effort is normal. No respiratory distress. Breath sounds: Normal breath sounds. No wheezing or rales. Abdominal:      General: Bowel sounds are normal. There is no distension. Palpations: Abdomen is soft. Tenderness: There is no abdominal tenderness. Musculoskeletal:         General: No tenderness. Normal range of motion. Cervical back: Normal range of motion and neck supple. No rigidity. No muscular tenderness. Lymphadenopathy:      Cervical: No cervical adenopathy. Skin:     General: Skin is warm and dry. Findings: No erythema or rash. Neurological:      General: No focal deficit present. Mental Status: She is alert and oriented to person, place, and time. Cranial Nerves: No cranial nerve deficit. Deep Tendon Reflexes: Reflexes are normal and symmetric.  Reflexes normal. Psychiatric:         Mood and Affect: Mood normal.                                 ASSESSMENT/PLAN:    Patient Active Problem List   Diagnosis    Essential hypertension    Myalgia    Fatigue    Primary osteoarthritis of both knees    Primary osteoarthritis of both hips    Polyarthritis    BEELN positive    Raised antibody titer    Chronic left shoulder pain    Current moderate episode of major depressive disorder without prior episode ([address]    Heel spur, left    Class 2 severe obesity due to excess calories with serious comorbidity and body mass index (BMI) of 39.0 to 39.9 in adult [facility])       Hermilo Espino was seen today for leg pain, hip pain and toe pain. Diagnoses and all orders for this visit:    Primary osteoarthritis of both hips  -     XR HIP BILATERAL W AP PELVIS (2 VIEWS); Future  -     Anca - Philomena Guzman MD, [facility], [place]    Primary osteoarthritis of both knees  -     XR KNEE BILATERAL STANDING; Future  -     Mata Watkins MD, [facility], [place]    BELEN positive  -     [id number], Rheumatology, [address], Rheumatology, [address] [place] [address], Rheumatology, [place]    Other orders  -     diclofenac (CATAFLAM) 50 MG tablet; Take 1 tablet by mouth 3 times daily          Return if symptoms worsen or fail to improve. I spent 30 minutes with this patient. I spent greater than 50% of the time counseling this patient.         Gustavo Melendrez,   12/14/2021  3:06 PM

## 2022-01-04 RX ORDER — METOPROLOL TARTRATE 50 MG/1
TABLET, FILM COATED ORAL
Qty: 180 TABLET | Refills: 0 | Status: SHIPPED
Start: 2022-01-04 | End: 2022-04-08

## 2022-01-04 RX ORDER — BUPROPION HYDROCHLORIDE 150 MG/1
TABLET ORAL
Qty: 90 TABLET | Refills: 0 | Status: SHIPPED
Start: 2022-01-04 | End: 2022-04-08

## 2022-01-04 RX ORDER — AMLODIPINE BESYLATE 10 MG/1
TABLET ORAL
Qty: 90 TABLET | Refills: 0 | Status: SHIPPED
Start: 2022-01-04 | End: 2022-04-08

## 2022-01-04 RX ORDER — BUSPIRONE HYDROCHLORIDE 5 MG/1
TABLET ORAL
Qty: 180 TABLET | Refills: 0 | Status: SHIPPED
Start: 2022-01-04 | End: 2022-04-08

## 2022-01-04 RX ORDER — LOSARTAN POTASSIUM 100 MG/1
TABLET ORAL
Qty: 90 TABLET | Refills: 0 | Status: SHIPPED
Start: 2022-01-04 | End: 2022-04-18

## 2022-01-12 ENCOUNTER — OFFICE VISIT (OUTPATIENT)
Dept: ORTHOPEDIC SURGERY | Age: 57
End: 2022-01-12
Payer: COMMERCIAL

## 2022-01-12 VITALS — WEIGHT: 220 LBS | HEIGHT: 64 IN | BODY MASS INDEX: 37.56 KG/M2

## 2022-01-12 DIAGNOSIS — M54.42 CHRONIC LEFT-SIDED LOW BACK PAIN WITH LEFT-SIDED SCIATICA: ICD-10-CM

## 2022-01-12 DIAGNOSIS — M25.552 BILATERAL HIP PAIN: Primary | ICD-10-CM

## 2022-01-12 DIAGNOSIS — M25.551 BILATERAL HIP PAIN: Primary | ICD-10-CM

## 2022-01-12 DIAGNOSIS — M25.562 PAIN IN BOTH KNEES, UNSPECIFIED CHRONICITY: ICD-10-CM

## 2022-01-12 DIAGNOSIS — M25.561 PAIN IN BOTH KNEES, UNSPECIFIED CHRONICITY: ICD-10-CM

## 2022-01-12 DIAGNOSIS — G89.29 CHRONIC LEFT-SIDED LOW BACK PAIN WITH LEFT-SIDED SCIATICA: ICD-10-CM

## 2022-01-12 PROCEDURE — 99203 OFFICE O/P NEW LOW 30 MIN: CPT | Performed by: ORTHOPAEDIC SURGERY

## 2022-01-12 NOTE — PROGRESS NOTES
New Hip Patient     Referring Provider:   Nelly Chu DO  [address] [facility]  [address]  [place],  [address]    CHIEF COMPLAINT: No chief complaint on file. HPI:    Boubacar Tinsley is a 60y.o. year old female who is seen today  for evaluation of left hip and left knee pain. Pain has been present for several years. She has been seen by rheumatologist with a negative rheumatological work-up. She is also scheduled to see a new rheumatologist in the future. She sees podiatry related to chronic foot pain. She denies any history of injury to the left lower extremity. Pain predominately occurs in the posterior gluteal region as well as the knee. Pain is worsened with walking. She also wakes up at night with pain in the posterior gluteal and anterior knee regions. She denies any numbness in the extremity. She takes anti-inflammatories prescribed by podiatry. She has a scheduled fusion of digits in her foot. She has had no injections to the knee or hip. She has had no formal therapy related to the left lower extremity. She is employed at [employer] and walks substantial distances daily. PAST MEDICAL HISTORY  Past Medical History:   Diagnosis Date    Essential hypertension 2017       PAST SURGICAL HISTORY  No past surgical history on file. FAMILY HISTORY   No family history on file. SOCIAL HISTORY  Social History     Occupational History     Employer: [employer]   Tobacco Use    Smoking status: Former Smoker     Packs/day: 0.50     Years: 35.00     Pack years: 17.50     Quit date: 2009     Years since quittin.6    Smokeless tobacco: Never Used   Vaping Use    Vaping Use: Never used   Substance and Sexual Activity    Alcohol use:  Yes    Drug use: Never    Sexual activity: Not Currently     Partners: Male       CURRENT MEDICATIONS     Current Outpatient Medications:     busPIRone (BUSPAR) 5 MG tablet, Take 1 tablet by mouth twice daily, Disp: 180 tablet, Rfl: 0    buPROPion (WELLBUTRIN XL) 150 MG extended release tablet, TAKE 1 TABLET BY MOUTH ONCE DAILY IN THE MORNING, Disp: 90 tablet, Rfl: 0    losartan (COZAAR) 100 MG tablet, Take 1 tablet by mouth once daily, Disp: 90 tablet, Rfl: 0    metoprolol tartrate (LOPRESSOR) 50 MG tablet, Take 1 tablet by mouth twice daily, Disp: 180 tablet, Rfl: 0    amLODIPine (NORVASC) 10 MG tablet, Take 1 tablet by mouth once daily, Disp: 90 tablet, Rfl: 0    diclofenac (CATAFLAM) 50 MG tablet, Take 1 tablet by mouth 3 times daily, Disp: 90 tablet, Rfl: 3    diclofenac (CATAFLAM) 50 MG tablet, Take 1 tablet by mouth 2 times daily, Disp: 90 tablet, Rfl: 1    diclofenac sodium (VOLTAREN) 1 % GEL, Apply 4 g topically 2 times daily, Disp: 200 g, Rfl: 2    cetirizine (ZYRTEC ALLERGY) 10 MG tablet, Take 10 mg by mouth daily, Disp: , Rfl:     aspirin 81 MG tablet, Take 81 mg by mouth daily, Disp: , Rfl:     Potassium 99 MG TABS, Take 99 mg by mouth , Disp: , Rfl:     ALLERGIES  No Known Allergies    Controlled Substances Monitoring:      REVIEW OF SYSTEMS:     Constitutional:  Negative for weight loss, fevers, chills, fatigue  Cardiovascular: Negative for chest pain, palpitations  Pulmonary: Negative for shortness of breath, labored breathing, cough  GI: negative for abdominal pain, nausea, vomitting   MSK: per HPI  Skin: negative for rash, open wounds    All other systems reviewed and are negative           PHYSICAL EXAM     There were no vitals filed for this visit. Height:    Weight: [unfilled]  BMI:  There is no height or weight on file to calculate BMI. General: The patient is alert and oriented x 3, appears to be stated age and in no distress. HEENT: head is normocephalic, atraumatic. EOMI. Neck: supple, trachea midline, no thyromegaly   Cardiovascular: peripheral pulses palpable.   Normal Capillary refill   Respiratory: breathing unlabored, chest expansion symmetric   Skin: no rash, no open wounds, no erythema  Psych: normal affect; mood stable  Neurologic: gait normal, sensation grossly intact in extremities  MSK:     Hip:  No gross abnormalities or signs of trauma   Internal and external rotation well-tolerated without pain, both in flexed and extended positions  No tenderness to palpation over the greater trochanter  No pain with KASSIE or FADIR   Flexion of the hip past 90° causes some posterior gluteal discomfort   Minimal discomfort with palpation of the sacroiliac region bilaterally   Sensation grossly intact to the entirety of the extremity   Pulses intact in the foot     Knee: Moderate discomfort with palpation over the anterior knee at the patella   Moderate tenderness to palpation of the medial and lateral joint lines   Extensor mechanism intact, no extensor lag   Crepitance with range of motion of the knee felt over the patella   No varus or valgus instability, negative Lachman's      IMAGING:    XR:   Minimal degenerative changes about the left knee involving predominantly the medial compartment and patellofemoral compartment. No significant degenerative changes noted to the left hip. Joint space overall is well-preserved. Small amount of osteophyte formation present. ASSESSMENT  Left sided low back pain  Left knee pain, patellofemoral    PLAN  Imaging and clinical exam findings discussed with the patient. The chronic nature of her condition were also discussed. Based on physical exam, her lower back is likely contributing to her symptoms. We discussed conservative treatment including anti-inflammatories, physical therapy, and potential corticosteroid injection. Patient elects for conservative treatment at this time with physical therapy. She will follow-up in 6 weeks for further evaluation. Kristina Bautista DO  Orthopaedic Surgery   1/12/22  9:19 AM    Staff Addendum    I have seen and evaluated the patient and agree with the assessment and plan as documented by the orthopaedic surgery resident.  I have performed the key components of the history and physical examination and concur with the findings and plan, and have made changes where appropriate/necessary. Agree with above. Clinically, her exam seems to be more low back related with radiculopathy, also patellofemoral pain. Images of hip and knee fairly benign. Recommended a course of PT. She is agreeable.   Follow-up in 3 months as needed      Regan Rivas MD  [facility]

## 2022-02-10 ENCOUNTER — OFFICE VISIT (OUTPATIENT)
Dept: RHEUMATOLOGY | Age: 57
End: 2022-02-10
Payer: COMMERCIAL

## 2022-02-10 VITALS
DIASTOLIC BLOOD PRESSURE: 80 MMHG | RESPIRATION RATE: 18 BRPM | OXYGEN SATURATION: 99 % | HEART RATE: 76 BPM | HEIGHT: 64 IN | SYSTOLIC BLOOD PRESSURE: 128 MMHG | BODY MASS INDEX: 37.56 KG/M2 | WEIGHT: 220 LBS

## 2022-02-10 DIAGNOSIS — R76.8 ANA POSITIVE: ICD-10-CM

## 2022-02-10 DIAGNOSIS — M15.9 GENERALIZED OSTEOARTHRITIS: ICD-10-CM

## 2022-02-10 DIAGNOSIS — M13.0 POLYARTHRITIS: Primary | ICD-10-CM

## 2022-02-10 DIAGNOSIS — M13.0 POLYARTHRITIS: ICD-10-CM

## 2022-02-10 LAB — SEDIMENTATION RATE, ERYTHROCYTE: 7 MM/HR (ref 0–20)

## 2022-02-10 PROCEDURE — 99214 OFFICE O/P EST MOD 30 MIN: CPT | Performed by: INTERNAL MEDICINE

## 2022-02-10 ASSESSMENT — ENCOUNTER SYMPTOMS
VOMITING: 0
ABDOMINAL PAIN: 0
BACK PAIN: 1
SHORTNESS OF BREATH: 0
COUGH: 0
COLOR CHANGE: 0
NAUSEA: 0
TROUBLE SWALLOWING: 0
DIARRHEA: 0

## 2022-02-10 NOTE — PROGRESS NOTES
Jackie Lee 1965 is a 60 y.o. female, here for evaluation of the following chief complaint(s):  New Patient (Patient here as a new patient for BELEN positive, myalgia and polyarthritis. )         ASSESSMENT/PLAN:    Jackie Lee 1965 is a 60 y.o. female seen in polyarthritis and positive BELEN. 1.  Polyarthritis-clinical picture is most consistent with osteoarthritis. I see no obvious synovitis on exam to suggest an underlying inflammatory arthritis but will need further work-up as below. 2.  Osteoarthritis-either way she has underlying osteoarthritis. She has had benefit with diclofenac. She can continue that as needed with food. 3.  Positive BELEN-she has an BELEN from a few years back which is flagged as positive at a titer of 1: 80. Anything less than 1: 160 is not likely to be significant. We will repeat her BELEN. If she has a significantly positive titer will need further work-up but clinically she has no obvious signs to suggest an underlying systemic connective tissue disease. If work-up below is unrevealing she can follow-up with me on a as needed basis. If I see anything concerning on work-up below I will have her back for follow-up as appropriate. 1. Polyarthritis  -     BELEN; Future  -     C-Reactive Protein; Future  -     Cyclic Citrul Peptide Antibody, IgG; Future  -     Rheumatoid Factor; Future  -     Sedimentation Rate; Future  2. BELEN positive  -     BELEN; Future  -     C-Reactive Protein; Future  -     Cyclic Citrul Peptide Antibody, IgG; Future  -     Rheumatoid Factor; Future  -     Sedimentation Rate; Future  3. Generalized osteoarthritis      Return if symptoms worsen or fail to improve. Subjective   SUBJECTIVE/OBJECTIVE:    HPI: Jackie Lee 1965 is a 60 y.o. female seen in consult for polyarthritis and positive BELEN. Patient states that she has chronic joint pain involving mainly the left knee and hips as well as the feet.   She will actually likely be Effort: Pulmonary effort is normal.   Musculoskeletal:         General: Tenderness present. No swelling or deformity. Comments: She has some tenderness in the knees bilaterally and with MTP squeeze test but no obvious synovitis on exam.   Skin:     General: Skin is warm and dry. Findings: No rash. Neurological:      General: No focal deficit present. Mental Status: She is alert and oriented to person, place, and time. Mental status is at baseline. Psychiatric:         Mood and Affect: Mood normal.         Behavior: Behavior normal.            Lab Results   Component Value Date    WBC 9.1 03/12/2021    HGB 15.3 03/12/2021    HCT 48.6 (H) 03/12/2021    MCV 88.4 03/12/2021     03/12/2021     Lab Results   Component Value Date     03/12/2021    K 4.4 03/12/2021     03/12/2021    CO2 23 03/12/2021    BUN 13 03/12/2021    CREATININE 0.7 03/12/2021    GLUCOSE 104 (H) 03/12/2021    CALCIUM 9.6 03/12/2021    PROT 7.5 03/12/2021    LABALBU 4.6 03/12/2021    BILITOT 0.6 03/12/2021    ALKPHOS 67 03/12/2021    AST 19 03/12/2021    ALT 20 03/12/2021    LABGLOM >60 03/12/2021    GFRAA >60 03/12/2021     Lab Results   Component Value Date    BELEN POSITIVE (A) 05/20/2019     No results found for: RHEUMFACTOR  Lab Results   Component Value Date    SEDRATE 2 05/20/2019     No results found for: CRP         An electronic signature was used to authenticate this note. This note was generated with a voice recognition dictation system. Please disregard any errors or omission which have escaped my review.     --Sae Loco, DO

## 2022-02-11 LAB
ANTI-NUCLEAR ANTIBODY (ANA): NEGATIVE
C-REACTIVE PROTEIN: 0.8 MG/DL (ref 0–0.4)
RHEUMATOID FACTOR: <10 IU/ML (ref 0–13)

## 2022-02-14 LAB — CYCLIC CITRULLINATED PEPTIDE ANTIBODY IGG: 1.4 U/ML (ref 0–7)

## 2022-02-24 ENCOUNTER — OFFICE VISIT (OUTPATIENT)
Dept: PODIATRY | Age: 57
End: 2022-02-24
Payer: COMMERCIAL

## 2022-02-24 VITALS
BODY MASS INDEX: 39.14 KG/M2 | TEMPERATURE: 98.2 F | SYSTOLIC BLOOD PRESSURE: 142 MMHG | DIASTOLIC BLOOD PRESSURE: 82 MMHG | WEIGHT: 228 LBS

## 2022-02-24 DIAGNOSIS — M20.22 HALLUX RIGIDUS, BILATERAL: ICD-10-CM

## 2022-02-24 DIAGNOSIS — M79.672 PAIN IN LEFT FOOT: Primary | ICD-10-CM

## 2022-02-24 DIAGNOSIS — M20.21 HALLUX RIGIDUS, BILATERAL: ICD-10-CM

## 2022-02-24 DIAGNOSIS — M79.671 PAIN IN RIGHT FOOT: ICD-10-CM

## 2022-02-24 PROCEDURE — 99213 OFFICE O/P EST LOW 20 MIN: CPT | Performed by: PODIATRIST

## 2022-02-24 NOTE — PATIENT INSTRUCTIONS
Surgery will be scheduled for 4/22/22 at [facility] in [address]   They will contact you to give you all your preop instructions  Do not eat or drink anything after midnight on 4/21/2022 nothing morning of surgery  Call Jae Pate if any issues [phone]

## 2022-02-24 NOTE — PROGRESS NOTES
22  Janine Gongora : 1965 Sex: female  Age: 60 y.o. Patient was referred by Patricia Hernandez DO    Chief Complaint   Patient presents with    Bunions     hallux rigidus b/l wants to discuss possible fusion sx pt was seen last year for PF which is better        SUBJECTIVE this patient is seen today to discuss surgical correction for bilateral great toe pain. HPI  Review of Systems  Const: Denies constitutional symptoms  Musculo: Denies symptoms other than stated above  Skin: Denies symptoms other than stated above       Current Outpatient Medications:     NONFORMULARY, Indications: CBD oil , Disp: , Rfl:     busPIRone (BUSPAR) 5 MG tablet, Take 1 tablet by mouth twice daily, Disp: 180 tablet, Rfl: 0    buPROPion (WELLBUTRIN XL) 150 MG extended release tablet, TAKE 1 TABLET BY MOUTH ONCE DAILY IN THE MORNING, Disp: 90 tablet, Rfl: 0    losartan (COZAAR) 100 MG tablet, Take 1 tablet by mouth once daily, Disp: 90 tablet, Rfl: 0    metoprolol tartrate (LOPRESSOR) 50 MG tablet, Take 1 tablet by mouth twice daily, Disp: 180 tablet, Rfl: 0    amLODIPine (NORVASC) 10 MG tablet, Take 1 tablet by mouth once daily, Disp: 90 tablet, Rfl: 0    diclofenac (CATAFLAM) 50 MG tablet, Take 1 tablet by mouth 2 times daily, Disp: 90 tablet, Rfl: 1    diclofenac sodium (VOLTAREN) 1 % GEL, Apply 4 g topically 2 times daily, Disp: 200 g, Rfl: 2    aspirin 81 MG tablet, Take 81 mg by mouth daily, Disp: , Rfl:     Potassium 99 MG TABS, Take 99 mg by mouth , Disp: , Rfl:   No Known Allergies    Past Medical History:   Diagnosis Date    Essential hypertension 2017     No past surgical history on file. No family history on file.   Social History     Socioeconomic History    Marital status:      Spouse name: Not on file    Number of children: Not on file    Years of education: Not on file    Highest education level: Not on file   Occupational History     Employer: [employer]   Tobacco Use    Smoking status: Former Smoker     Packs/day: 0.50     Years: 35.00     Pack years: 17.50     Quit date: 2009     Years since quittin.7    Smokeless tobacco: Never Used   Vaping Use    Vaping Use: Never used   Substance and Sexual Activity    Alcohol use: Yes    Drug use: Never    Sexual activity: Not Currently     Partners: Male   Other Topics Concern    Not on file   Social History Narrative    Not on file     Social Determinants of Health     Financial Resource Strain:     Difficulty of Paying Living Expenses: Not on file   Food Insecurity:     Worried About Running Out of Food in the Last Year: Not on file    Eun of Food in the Last Year: Not on file   Transportation Needs:     Lack of Transportation (Medical): Not on file    Lack of Transportation (Non-Medical):  Not on file   Physical Activity:     Days of Exercise per Week: Not on file    Minutes of Exercise per Session: Not on file   Stress:     Feeling of Stress : Not on file   Social Connections:     Frequency of Communication with Friends and Family: Not on file    Frequency of Social Gatherings with Friends and Family: Not on file    Attends Pentecostal Services: Not on file    Active Member of [address] [place] [address] or Organizations: Not on file    Attends Club or Organization Meetings: Not on file    Marital Status: Not on file   Intimate Partner Violence:     Fear of Current or Ex-Partner: Not on file    Emotionally Abused: Not on file    Physically Abused: Not on file    Sexually Abused: Not on file   Housing Stability:     Unable to Pay for Housing in the Last Year: Not on file    Number of [place] in the Last Year: Not on file    Unstable Housing in the Last Year: Not on file       Vitals:    22 1420   BP: (!) 142/82   Temp: 98.2 °F (36.8 °C)   TempSrc: Temporal   Weight: 228 lb (103.4 kg)       Focused Lower Extremity Physical Exam:  Vitals:    22 1420   BP: (!) 142/82   Temp: 98.2 °F (36.8 °C)        Foot Exam    General  General Appearance: appears stated age and healthy   Orientation: alert and oriented to person, place, and time       Right Foot/Ankle     Inspection and Palpation  Tenderness: great toe metatarsophalangeal joint     Neurovascular  Dorsalis pedis: 3+  Posterior tibial: 3+  Saphenous nerve sensation: normal  Tibial nerve sensation: normal  Superficial peroneal nerve sensation: normal  Deep peroneal nerve sensation: normal  Sural nerve sensation: normal  Achilles reflex: 2+  Babinski reflex: 2+    Range of Motion    Passive  1st MTP extension: pain  1st MTP flexion: pain    Active  1st MTP extension: pain  1st MTP flexion: pain      Left Foot/Ankle      Inspection and Palpation  Tenderness: great toe metatarsophalangeal joint     Neurovascular  Dorsalis pedis: 3+  Posterior tibial: 3+  Saphenous nerve sensation: normal  Tibial nerve sensation: normal  Superficial peroneal nerve sensation: normal  Deep peroneal nerve sensation: normal  Sural nerve sensation: normal  Achilles reflex: 2+  Babinski reflex: 2+    Range of Motion    Passive  1st MTP extension: pain  1st MTP flexion: pain    Active  1st MTP extension: pain  1st MTP flexion: pain             Ortho Exam    Vascular: pulses  dp  pt    Capillary Refill Time:   Hair growth  Skin:    Edema:    Neurologic:      Musculoskeletal/ Orthopedic examination: Bilateral hallux first metatarsophalangeal joint pain with palpation both dorsal plantar medial and lateral to crepitus decreased range of motion at the MPJ joint. NAIL   Web space   Derm:      No results found. Assessment and Plan: Today we reviewed and discussed the surgery. This would be an arthrodesis first metatarsophalangeal joint right foot first.      I had a long discussion today with the patient about the likely diagnosis and its natural history, physical exam and imaging findings, as well as treatment options.  We discussed both surgical and nonsurgical treatments, including risks and benefits. From a nonoperative standpoint, we discussed rest/activity modification, NSAIDs/Acetaminophen/topical anesthetics, orthotics, bracing/immobilization, and physical therapy. After conservative treatment has failed patient agrees and would like to have surgery today, we reviewed all aspects of the surgery including the risks the complications and the postop course there are no guarantees,   Patient agrees with surgery   we reviewed all risk and benefits all complications including anesthesia infection further surgery over under correction patient agrees to proceed with surgery. General the terms and procedures of the treatment were undertaken there was alternative procedures and methods discussed with the patient the patient declined these and opted for surgery. All the risks with the procedure were reviewed. Patient will have surgery at [facility] in the future       No follow-ups on file. Seen By:  Raji Tovar DPM      Document was created using voice recognition software. Note was reviewed, however may contain grammatical errors.

## 2022-02-25 ENCOUNTER — TELEPHONE (OUTPATIENT)
Dept: PODIATRY | Age: 57
End: 2022-02-25

## 2022-02-25 NOTE — TELEPHONE ENCOUNTER
Called insurance company [employer] Spoke with [id number]    Outpatient foot Sx Procedure code 98860    DX code : m20.21 m20.22    Is covered @ % after deductible.     Deductible: $     Met Year:     Pre-cert Needed: no    Date of procedure : 4/22/22

## 2022-03-23 ENCOUNTER — TELEPHONE (OUTPATIENT)
Dept: PODIATRY | Age: 57
End: 2022-03-23

## 2022-03-23 NOTE — TELEPHONE ENCOUNTER
Pt has sx scheduled for 4/22 for arthrodesis right foot   She stated that she wants her left foot done first it bothers her more did get xrays done of both feet at last appointment  If you change the foot to left what procedure would we do   Let me know

## 2022-03-23 NOTE — TELEPHONE ENCOUNTER
Dr. Erica Valencia ok'd to do left foot first   Called sx center to change to left foot   Informed pt of this

## 2022-03-24 ENCOUNTER — OFFICE VISIT (OUTPATIENT)
Dept: PRIMARY CARE CLINIC | Age: 57
End: 2022-03-24
Payer: COMMERCIAL

## 2022-03-24 VITALS
OXYGEN SATURATION: 97 % | HEART RATE: 57 BPM | HEIGHT: 64 IN | TEMPERATURE: 97.6 F | WEIGHT: 225 LBS | BODY MASS INDEX: 38.41 KG/M2 | RESPIRATION RATE: 18 BRPM | SYSTOLIC BLOOD PRESSURE: 124 MMHG | DIASTOLIC BLOOD PRESSURE: 86 MMHG

## 2022-03-24 DIAGNOSIS — M19.072 ARTHRITIS OF LEFT FOOT: ICD-10-CM

## 2022-03-24 DIAGNOSIS — Z12.11 SCREEN FOR COLON CANCER: ICD-10-CM

## 2022-03-24 DIAGNOSIS — Z01.818 PRE-OP EXAM: ICD-10-CM

## 2022-03-24 DIAGNOSIS — I10 ESSENTIAL HYPERTENSION: ICD-10-CM

## 2022-03-24 DIAGNOSIS — F32.1 CURRENT MODERATE EPISODE OF MAJOR DEPRESSIVE DISORDER WITHOUT PRIOR EPISODE (HCC): ICD-10-CM

## 2022-03-24 DIAGNOSIS — R53.83 FATIGUE, UNSPECIFIED TYPE: ICD-10-CM

## 2022-03-24 DIAGNOSIS — M13.0 POLYARTHRITIS: ICD-10-CM

## 2022-03-24 DIAGNOSIS — Z12.31 SCREENING MAMMOGRAM FOR BREAST CANCER: ICD-10-CM

## 2022-03-24 DIAGNOSIS — Z01.818 PRE-OP EXAM: Primary | ICD-10-CM

## 2022-03-24 LAB
ALBUMIN SERPL-MCNC: 4.5 G/DL (ref 3.5–5.2)
ALP BLD-CCNC: 55 U/L (ref 35–104)
ALT SERPL-CCNC: 25 U/L (ref 0–32)
ANION GAP SERPL CALCULATED.3IONS-SCNC: 14 MMOL/L (ref 7–16)
AST SERPL-CCNC: 28 U/L (ref 0–31)
BILIRUB SERPL-MCNC: 0.4 MG/DL (ref 0–1.2)
BUN BLDV-MCNC: 18 MG/DL (ref 6–20)
CALCIUM SERPL-MCNC: 9.9 MG/DL (ref 8.6–10.2)
CHLORIDE BLD-SCNC: 102 MMOL/L (ref 98–107)
CHOLESTEROL, TOTAL: 201 MG/DL (ref 0–199)
CO2: 24 MMOL/L (ref 22–29)
CREAT SERPL-MCNC: 0.8 MG/DL (ref 0.5–1)
GFR AFRICAN AMERICAN: >60
GFR NON-AFRICAN AMERICAN: >60 ML/MIN/1.73
GLUCOSE BLD-MCNC: 86 MG/DL (ref 74–99)
HCT VFR BLD CALC: 46.1 % (ref 34–48)
HDLC SERPL-MCNC: 62 MG/DL
HEMOGLOBIN: 14.5 G/DL (ref 11.5–15.5)
LDL CHOLESTEROL CALCULATED: 114 MG/DL (ref 0–99)
MCH RBC QN AUTO: 27.4 PG (ref 26–35)
MCHC RBC AUTO-ENTMCNC: 31.5 % (ref 32–34.5)
MCV RBC AUTO: 87 FL (ref 80–99.9)
PDW BLD-RTO: 13.9 FL (ref 11.5–15)
PLATELET # BLD: 283 E9/L (ref 130–450)
PMV BLD AUTO: 11.6 FL (ref 7–12)
POTASSIUM SERPL-SCNC: 4.4 MMOL/L (ref 3.5–5)
RBC # BLD: 5.3 E12/L (ref 3.5–5.5)
SODIUM BLD-SCNC: 140 MMOL/L (ref 132–146)
TOTAL PROTEIN: 7.4 G/DL (ref 6.4–8.3)
TRIGL SERPL-MCNC: 123 MG/DL (ref 0–149)
TSH SERPL DL<=0.05 MIU/L-ACNC: 1.94 UIU/ML (ref 0.27–4.2)
VLDLC SERPL CALC-MCNC: 25 MG/DL
WBC # BLD: 9.4 E9/L (ref 4.5–11.5)

## 2022-03-24 PROCEDURE — 93000 ELECTROCARDIOGRAM COMPLETE: CPT | Performed by: FAMILY MEDICINE

## 2022-03-24 PROCEDURE — 99214 OFFICE O/P EST MOD 30 MIN: CPT | Performed by: FAMILY MEDICINE

## 2022-03-24 ASSESSMENT — ENCOUNTER SYMPTOMS
EYE REDNESS: 0
SHORTNESS OF BREATH: 0
COLOR CHANGE: 0
EYE PAIN: 0
VOMITING: 0
DIARRHEA: 0
CONSTIPATION: 0
NAUSEA: 0
ABDOMINAL PAIN: 0
SINUS COMPLAINT: 1
RHINORRHEA: 0
APNEA: 0
CHEST TIGHTNESS: 0
WHEEZING: 0
BLOOD IN STOOL: 0
SINUS PRESSURE: 1
BACK PAIN: 0
EYE ITCHING: 0
VISUAL CHANGE: 0
COUGH: 1
SORE THROAT: 0

## 2022-03-24 ASSESSMENT — PATIENT HEALTH QUESTIONNAIRE - PHQ9
SUM OF ALL RESPONSES TO PHQ QUESTIONS 1-9: 0
1. LITTLE INTEREST OR PLEASURE IN DOING THINGS: 0
10. IF YOU CHECKED OFF ANY PROBLEMS, HOW DIFFICULT HAVE THESE PROBLEMS MADE IT FOR YOU TO DO YOUR WORK, TAKE CARE OF THINGS AT HOME, OR GET ALONG WITH OTHER PEOPLE: 0
2. FEELING DOWN, DEPRESSED OR HOPELESS: 0
4. FEELING TIRED OR HAVING LITTLE ENERGY: 0
7. TROUBLE CONCENTRATING ON THINGS, SUCH AS READING THE NEWSPAPER OR WATCHING TELEVISION: 0
3. TROUBLE FALLING OR STAYING ASLEEP: 0
6. FEELING BAD ABOUT YOURSELF - OR THAT YOU ARE A FAILURE OR HAVE LET YOURSELF OR YOUR FAMILY DOWN: 0
5. POOR APPETITE OR OVEREATING: 0
SUM OF ALL RESPONSES TO PHQ QUESTIONS 1-9: 0
SUM OF ALL RESPONSES TO PHQ QUESTIONS 1-9: 0
SUM OF ALL RESPONSES TO PHQ9 QUESTIONS 1 & 2: 0
8. MOVING OR SPEAKING SO SLOWLY THAT OTHER PEOPLE COULD HAVE NOTICED. OR THE OPPOSITE, BEING SO FIGETY OR RESTLESS THAT YOU HAVE BEEN MOVING AROUND A LOT MORE THAN USUAL: 0
SUM OF ALL RESPONSES TO PHQ QUESTIONS 1-9: 0
9. THOUGHTS THAT YOU WOULD BE BETTER OFF DEAD, OR OF HURTING YOURSELF: 0

## 2022-03-24 NOTE — LETTER
[facility]  [address]  [place] [address]  Phone: [phone]  Fax: [phone], DO        March 24, 2022     Patient: Melyssa Adamson   YOB: 1965   Date of Visit: 3/24/2022       To Whom It May Concern: It is my medical opinion that Alejandra Butterfield is medically stable for surgery. If you have any questions or concerns, please don't hesitate to call.     Sincerely,        Yessy Lomax DO

## 2022-03-24 NOTE — PROGRESS NOTES
Chief Complaint:     Chief Complaint   Patient presents with    Pre-op Exam    Hypertension    Foot Pain    Generalized Body Aches    Fatigue    Mental Health Problem    Depression    Anxiety         Hypertension  This is a chronic problem. The current episode started more than 1 year ago. The problem has been waxing and waning since onset. The problem is uncontrolled. Associated symptoms include headaches. Pertinent negatives include no chest pain, neck pain, palpitations or shortness of breath. There are no associated agents to hypertension. Risk factors for coronary artery disease include stress, obesity and post-menopausal state. Past treatments include angiotensin blockers, beta blockers and calcium channel blockers. The current treatment provides moderate improvement. There is no history of CVA or PVD. There is no history of renovascular disease, sleep apnea or a thyroid problem. Foot Pain   This is a recurrent problem. The current episode started more than 1 month ago. The problem occurs intermittently. The problem has been waxing and waning. Associated symptoms include an inability to bear weight. Pertinent negatives include no fever, numbness or tingling. Nothing aggravates the symptoms. She has tried nothing for the symptoms. The treatment provided no relief. Generalized Body Aches  This is a recurrent problem. The current episode started more than 1 month ago. The problem occurs daily. The problem has been waxing and waning. Associated symptoms include arthralgias, congestion, coughing, fatigue, headaches and myalgias. Pertinent negatives include no abdominal pain, chest pain, chills, fever, nausea, neck pain, numbness, rash, sore throat, visual change, vomiting or weakness. Nothing aggravates the symptoms. She has tried nothing for the symptoms. The treatment provided no relief. Fatigue  This is a new problem. The current episode started more than 1 month ago.  The problem occurs intermittently. The problem has been waxing and waning. Associated symptoms include arthralgias, congestion, coughing, fatigue, headaches and myalgias. Pertinent negatives include no abdominal pain, chest pain, chills, fever, nausea, neck pain, numbness, rash, sore throat, visual change, vomiting or weakness. Nothing aggravates the symptoms. She has tried nothing for the symptoms. The treatment provided no relief. Mental Health Problem  The primary symptoms do not include dysphoric mood. Additional symptoms of the illness include fatigue and headaches. Additional symptoms of the illness do not include appetite change, visual change or abdominal pain. Knee Pain   The incident occurred more than 1 week ago. The incident occurred at home. There was no injury mechanism. The pain is present in the left knee. The quality of the pain is described as aching. The pain is moderate. The pain has been fluctuating since onset. Associated symptoms include an inability to bear weight. Pertinent negatives include no numbness or tingling. She reports no foreign bodies present. The symptoms are aggravated by movement, palpation and weight bearing. She has tried nothing for the symptoms. The treatment provided no relief. Patient Active Problem List   Diagnosis    Essential hypertension    Myalgia    Fatigue    Primary osteoarthritis of both knees    Primary osteoarthritis of both hips    Polyarthritis    BELEN positive    Raised antibody titer    Chronic left shoulder pain    Current moderate episode of major depressive disorder without prior episode (HCC)    Heel spur, left    Class 2 severe obesity due to excess calories with serious comorbidity and body mass index (BMI) of 39.0 to 39.9 in adult [facility])       Past Medical History:   Diagnosis Date    Essential hypertension 5/22/2017       History reviewed. No pertinent surgical history.     Current Outpatient Medications   Medication Sig Dispense Refill    NONFORMULARY Indications: CBD oil       busPIRone (BUSPAR) 5 MG tablet Take 1 tablet by mouth twice daily 180 tablet 0    buPROPion (WELLBUTRIN XL) 150 MG extended release tablet TAKE 1 TABLET BY MOUTH ONCE DAILY IN THE MORNING 90 tablet 0    losartan (COZAAR) 100 MG tablet Take 1 tablet by mouth once daily 90 tablet 0    metoprolol tartrate (LOPRESSOR) 50 MG tablet Take 1 tablet by mouth twice daily 180 tablet 0    amLODIPine (NORVASC) 10 MG tablet Take 1 tablet by mouth once daily 90 tablet 0    diclofenac (CATAFLAM) 50 MG tablet Take 1 tablet by mouth 2 times daily 90 tablet 1    diclofenac sodium (VOLTAREN) 1 % GEL Apply 4 g topically 2 times daily 200 g 2    aspirin 81 MG tablet Take 81 mg by mouth daily      Potassium 99 MG TABS Take 99 mg by mouth        No current facility-administered medications for this visit. No Known Allergies    Social History     Socioeconomic History    Marital status:      Spouse name: None    Number of children: None    Years of education: None    Highest education level: None   Occupational History     Employer: [employer]   Tobacco Use    Smoking status: Former Smoker     Packs/day: 0.50     Years: 35.00     Pack years: 17.50     Quit date: 2009     Years since quittin.8    Smokeless tobacco: Never Used   Vaping Use    Vaping Use: Never used   Substance and Sexual Activity    Alcohol use: Yes    Drug use: Never    Sexual activity: Not Currently     Partners: Male   Other Topics Concern    None   Social History Narrative    None     Social Determinants of Health     Financial Resource Strain:     Difficulty of Paying Living Expenses: Not on file   Food Insecurity:     Worried About Running Out of Food in the Last Year: Not on file    Eun of Food in the Last Year: Not on file   Transportation Needs:     Lack of Transportation (Medical): Not on file    Lack of Transportation (Non-Medical):  Not on file   Physical Activity:     Days of Exercise per Week: Not on file    Minutes of Exercise per Session: Not on file   Stress:     Feeling of Stress : Not on file   Social Connections:     Frequency of Communication with Friends and Family: Not on file    Frequency of Social Gatherings with Friends and Family: Not on file    Attends Jehovah's witness Services: Not on file    Active Member of Clubs or Organizations: Not on file    Attends Club or Organization Meetings: Not on file    Marital Status: Not on file   Intimate Partner Violence:     Fear of Current or Ex-Partner: Not on file    Emotionally Abused: Not on file    Physically Abused: Not on file    Sexually Abused: Not on file   Housing Stability:     Unable to Pay for Housing in the Last Year: Not on file    Number of [place] in the Last Year: Not on file    Unstable Housing in the Last Year: Not on file       History reviewed. No pertinent family history. Review of Systems   Constitutional: Positive for fatigue. Negative for activity change, appetite change, chills and fever. HENT: Positive for congestion and sinus pressure. Negative for ear pain, hearing loss, nosebleeds, rhinorrhea and sore throat. Eyes: Negative for pain, redness, itching and visual disturbance. Respiratory: Positive for cough. Negative for apnea, chest tightness, shortness of breath and wheezing. Cardiovascular: Negative for chest pain, palpitations and leg swelling. Gastrointestinal: Negative for abdominal pain, blood in stool, constipation, diarrhea, nausea and vomiting. Endocrine: Negative. Genitourinary: Negative for decreased urine volume, difficulty urinating, dysuria, frequency, hematuria and urgency. Musculoskeletal: Positive for arthralgias and myalgias. Negative for back pain, gait problem and neck pain. Skin: Negative for color change and rash. Allergic/Immunologic: Negative for environmental allergies and food allergies. Neurological: Positive for headaches. Negative for dizziness, tingling, weakness, light-headedness and numbness. Hematological: Negative for adenopathy. Does not bruise/bleed easily. Psychiatric/Behavioral: Negative for behavioral problems, dysphoric mood and sleep disturbance. The patient is not nervous/anxious and is not hyperactive. All other systems reviewed and are negative. /86   Pulse 57   Temp 97.6 °F (36.4 °C)   Resp 18   Ht 5' 4\" (1.626 m)   Wt 225 lb (102.1 kg)   SpO2 97%   BMI 38.62 kg/m²     Physical Exam  Vitals and nursing note reviewed. Constitutional:       General: She is not in acute distress. Appearance: Normal appearance. She is well-developed. HENT:      Head: Normocephalic and atraumatic. Right Ear: Hearing, tympanic membrane and external ear normal. No tenderness. No middle ear effusion. Left Ear: Hearing, tympanic membrane and external ear normal. No tenderness. No middle ear effusion. Nose: Nose normal. No congestion or rhinorrhea. Right Turbinates: Not enlarged. Left Turbinates: Not enlarged. Mouth/Throat:      Mouth: Mucous membranes are moist.      Tongue: No lesions. Pharynx: Oropharynx is clear. No oropharyngeal exudate or posterior oropharyngeal erythema. Eyes:      General: No scleral icterus. Conjunctiva/sclera: Conjunctivae normal.      Pupils: Pupils are equal, round, and reactive to light. Neck:      Thyroid: No thyromegaly. Cardiovascular:      Rate and Rhythm: Normal rate and regular rhythm. Heart sounds: Normal heart sounds. No murmur heard. Pulmonary:      Effort: Pulmonary effort is normal. No respiratory distress. Breath sounds: Normal breath sounds. No wheezing or rales. Abdominal:      General: Bowel sounds are normal. There is no distension. Palpations: Abdomen is soft. Tenderness: There is no abdominal tenderness. Musculoskeletal:         General: No tenderness. Normal range of motion.       Cervical back: Normal range of motion and neck supple. No rigidity. No muscular tenderness. Lymphadenopathy:      Cervical: No cervical adenopathy. Skin:     General: Skin is warm and dry. Findings: No erythema or rash. Neurological:      General: No focal deficit present. Mental Status: She is alert and oriented to person, place, and time. Cranial Nerves: No cranial nerve deficit. Deep Tendon Reflexes: Reflexes are normal and symmetric. Reflexes normal.   Psychiatric:         Mood and Affect: Mood normal.                                 ASSESSMENT/PLAN:    Patient Active Problem List   Diagnosis    Essential hypertension    Myalgia    Fatigue    Primary osteoarthritis of both knees    Primary osteoarthritis of both hips    Polyarthritis    BELEN positive    Raised antibody titer    Chronic left shoulder pain    Current moderate episode of major depressive disorder without prior episode ([facility] [address]    Heel spur, left    Class 2 severe obesity due to excess calories with serious comorbidity and body mass index (BMI) of 39.0 to 39.9 in adult [facility])       [facility] was seen today for pre-op exam, hypertension, foot pain, generalized body aches, fatigue, mental health problem, depression and anxiety. Diagnoses and all orders for this visit:    Pre-op exam  -     EKG 12 Lead  -     CBC; Future  -     Comprehensive Metabolic Panel; Future    Arthritis of left foot    Essential hypertension  -     CBC; Future  -     Comprehensive Metabolic Panel; Future  -     Lipid Panel; Future  -     TSH; Future    Polyarthritis    Fatigue, unspecified type    Current moderate episode of major depressive disorder without prior episode (HCC)    Screening mammogram for breast cancer  -     TAMMY DIGITAL SCREEN W OR WO CAD BILATERAL; Future    Screen for colon cancer  -     POCT Fecal Immunochemical Test (FIT);  Future      EKG reviewed  Blood work pending  Medically stable for surgery    Return in about 6 months (around 9/24/2022) for Annual Exam.      I spent 30 minutes with this patient. I spent greater than 50% of the time counseling this patient.         [place],   3/24/2022  2:48 PM

## 2022-04-05 ENCOUNTER — PREP FOR PROCEDURE (OUTPATIENT)
Dept: PODIATRY | Age: 57
End: 2022-04-05

## 2022-04-05 RX ORDER — SODIUM CHLORIDE 0.9 % (FLUSH) 0.9 %
5-40 SYRINGE (ML) INJECTION EVERY 12 HOURS SCHEDULED
Status: CANCELLED | OUTPATIENT
Start: 2022-04-05

## 2022-04-05 RX ORDER — SODIUM CHLORIDE 9 MG/ML
25 INJECTION, SOLUTION INTRAVENOUS PRN
Status: CANCELLED | OUTPATIENT
Start: 2022-04-05

## 2022-04-05 RX ORDER — SODIUM CHLORIDE 0.9 % (FLUSH) 0.9 %
5-40 SYRINGE (ML) INJECTION PRN
Status: CANCELLED | OUTPATIENT
Start: 2022-04-05

## 2022-04-08 RX ORDER — METOPROLOL TARTRATE 50 MG/1
TABLET, FILM COATED ORAL
Qty: 180 TABLET | Refills: 0 | Status: SHIPPED
Start: 2022-04-08 | End: 2022-07-01

## 2022-04-08 RX ORDER — LOSARTAN POTASSIUM 50 MG/1
TABLET ORAL
Qty: 180 TABLET | Refills: 0 | Status: SHIPPED
Start: 2022-04-08 | End: 2022-07-01

## 2022-04-08 RX ORDER — AMLODIPINE BESYLATE 10 MG/1
TABLET ORAL
Qty: 90 TABLET | Refills: 0 | Status: SHIPPED
Start: 2022-04-08 | End: 2022-07-01

## 2022-04-08 RX ORDER — BUPROPION HYDROCHLORIDE 150 MG/1
TABLET ORAL
Qty: 90 TABLET | Refills: 0 | Status: SHIPPED
Start: 2022-04-08 | End: 2022-04-18

## 2022-04-08 RX ORDER — BUSPIRONE HYDROCHLORIDE 5 MG/1
TABLET ORAL
Qty: 180 TABLET | Refills: 0 | Status: SHIPPED
Start: 2022-04-08 | End: 2022-07-01

## 2022-04-18 NOTE — PROGRESS NOTES
[place] PRE-ADMISSION TESTING INSTRUCTIONS    The Preadmission Testing patient is instructed accordingly using the following criteria (check applicable):    ARRIVAL INSTRUCTIONS:  [x] Parking the day of Surgery is located in the Main Entrance lot. Upon entering the door, make an immediate right to the surgery reception desk    [x] Bring photo ID and insurance card    [] Bring in a copy of Living will or Durable Power of  papers. [x] Please be sure to arrange for responsible adult to provide transportation to and from the hospital    [x] Please arrange for responsible adult to be with you for the 24 hour period post procedure due to having anesthesia      GENERAL INSTRUCTIONS:    [x] Nothing by mouth after midnight, including gum, candy, mints or water    [x] You may brush your teeth, but do not swallow any water    [x] Take medications as instructed with 1-2 oz of water    [x] Stop herbal supplements and vitamins 5 days prior to procedure    [x] Follow preop dosing of blood thinners per physician instructions    [] Take 1/2 dose of evening insulin, but no insulin after midnight    [] No oral diabetic medications after midnight    [] If diabetic and have low blood sugar or feel symptomatic, take 1-2oz apple juice only    [] Bring inhalers day of surgery    [] Bring C-PAP/ Bi-Pap day of surgery    [] Bring urine specimen day of surgery    [x] Shower or bath with soap, lather and rinse well, AM of Surgery, no lotion, powders or creams to surgical site    [] Follow bowel prep as instructed per surgeon    [x] No tobacco products within 24 hours of surgery     [x] No alcohol or illegal drug use within 24 hours of surgery.     [x] Jewelry, body piercing's, eyeglasses, contact lenses and dentures are not permitted into surgery (bring cases)      [x] Please do not wear any nail polish, make up or hair products on the day of surgery    [x] You can expect a call the business day prior to procedure to notify you if your arrival time changes    [x] If you receive a survey after surgery we would greatly appreciate your comments    [] Parent/guardian of a minor must accompany their child and remain on the premises  the entire time they are under our care     [] Pediatric patients may bring favorite toy, blanket or comfort item with them    [] A caregiver or family member must remain with the patient during their stay if they are mentally handicapped, have dementia, disoriented or unable to use a call light or would be a safety concern if left unattended    [x] Please notify surgeon if you develop any illness between now and time of surgery (cold, cough, sore throat, fever, nausea, vomiting) or any signs of infections  including skin, wounds, and dental.    [x]  The Outpatient Pharmacy is available to fill your prescription here on your day of surgery, ask your preop nurse for details    [x] Other instructions: wear loose, comfortable clothing  EDUCATIONAL MATERIALS PROVIDED:    [] PAT Preoperative Education Packet/Booklet     [] Medication List    [] Transfusion bracelet applied with instructions    [] Shower with soap, lather and rinse well, and use CHG wipes provided the evening before surgery as instructed    [] Incentive spirometer with instructions

## 2022-04-18 NOTE — PROGRESS NOTES
Have you been tested for COVID  No           Have you been told you were positive for COVID Yes 9/2021  Have you had any known exposure to someone that is positive for COVID No  Do you have a cough                   No              Do you have shortness of breath No                 Do you have a sore throat            No                Are you having chills                    No                Are you having muscle aches. No                    Please come to the hospital wearing a mask and have your significant other wear a mask as well. Both of you should check your temperature before leaving to come here,  if it is 100 or higher please call [phone] for instruction.

## 2022-04-21 ENCOUNTER — ANESTHESIA EVENT (OUTPATIENT)
Dept: OPERATING ROOM | Age: 57
End: 2022-04-21
Payer: COMMERCIAL

## 2022-04-22 ENCOUNTER — ANESTHESIA (OUTPATIENT)
Dept: OPERATING ROOM | Age: 57
End: 2022-04-22
Payer: COMMERCIAL

## 2022-04-22 ENCOUNTER — HOSPITAL ENCOUNTER (OUTPATIENT)
Age: 57
Setting detail: OUTPATIENT SURGERY
Discharge: HOME | End: 2022-04-22
Attending: PODIATRIST | Admitting: PODIATRIST
Payer: COMMERCIAL

## 2022-04-22 ENCOUNTER — APPOINTMENT (OUTPATIENT)
Dept: GENERAL RADIOLOGY | Age: 57
End: 2022-04-22
Attending: PODIATRIST
Payer: COMMERCIAL

## 2022-04-22 VITALS
DIASTOLIC BLOOD PRESSURE: 74 MMHG | BODY MASS INDEX: 37.22 KG/M2 | RESPIRATION RATE: 20 BRPM | HEART RATE: 64 BPM | SYSTOLIC BLOOD PRESSURE: 135 MMHG | TEMPERATURE: 97.5 F | WEIGHT: 218 LBS | OXYGEN SATURATION: 94 % | HEIGHT: 64 IN

## 2022-04-22 VITALS
DIASTOLIC BLOOD PRESSURE: 50 MMHG | RESPIRATION RATE: 5 BRPM | TEMPERATURE: 72.9 F | OXYGEN SATURATION: 100 % | SYSTOLIC BLOOD PRESSURE: 93 MMHG

## 2022-04-22 DIAGNOSIS — M21.619 BUNION: Primary | ICD-10-CM

## 2022-04-22 DIAGNOSIS — G89.18 POST-OPERATIVE PAIN: Primary | ICD-10-CM

## 2022-04-22 PROCEDURE — 2500000003 HC RX 250 WO HCPCS: Performed by: PODIATRIST

## 2022-04-22 PROCEDURE — 6360000002 HC RX W HCPCS

## 2022-04-22 PROCEDURE — 3600000014 HC SURGERY LEVEL 4 ADDTL 15MIN: Performed by: PODIATRIST

## 2022-04-22 PROCEDURE — 2709999900 HC NON-CHARGEABLE SUPPLY: Performed by: PODIATRIST

## 2022-04-22 PROCEDURE — 3700000000 HC ANESTHESIA ATTENDED CARE: Performed by: PODIATRIST

## 2022-04-22 PROCEDURE — 3700000001 HC ADD 15 MINUTES (ANESTHESIA): Performed by: PODIATRIST

## 2022-04-22 PROCEDURE — C1762 CONN TISS, HUMAN(INC FASCIA): HCPCS | Performed by: PODIATRIST

## 2022-04-22 PROCEDURE — 7100000000 HC PACU RECOVERY - FIRST 15 MIN: Performed by: PODIATRIST

## 2022-04-22 PROCEDURE — 7100000001 HC PACU RECOVERY - ADDTL 15 MIN: Performed by: PODIATRIST

## 2022-04-22 PROCEDURE — C1713 ANCHOR/SCREW BN/BN,TIS/BN: HCPCS | Performed by: PODIATRIST

## 2022-04-22 PROCEDURE — 2580000003 HC RX 258: Performed by: PODIATRIST

## 2022-04-22 PROCEDURE — 6360000002 HC RX W HCPCS: Performed by: PODIATRIST

## 2022-04-22 PROCEDURE — 7100000011 HC PHASE II RECOVERY - ADDTL 15 MIN: Performed by: PODIATRIST

## 2022-04-22 PROCEDURE — 2720000010 HC SURG SUPPLY STERILE: Performed by: PODIATRIST

## 2022-04-22 PROCEDURE — 3600000004 HC SURGERY LEVEL 4 BASE: Performed by: PODIATRIST

## 2022-04-22 PROCEDURE — 28750 FUSION OF BIG TOE JOINT: CPT | Performed by: PODIATRIST

## 2022-04-22 PROCEDURE — 3209999900 FLUORO FOR SURGICAL PROCEDURES

## 2022-04-22 PROCEDURE — 2500000003 HC RX 250 WO HCPCS

## 2022-04-22 PROCEDURE — 7100000010 HC PHASE II RECOVERY - FIRST 15 MIN: Performed by: PODIATRIST

## 2022-04-22 DEVICE — GRAFT BONE 2.5CC HCT/P 361 REGULATED VIABLE CELLULAR: Type: IMPLANTABLE DEVICE | Site: FOOT | Status: FUNCTIONAL

## 2022-04-22 DEVICE — SCREW BNE L18MM DIA3MM FT ANK TI VAR ANG LCK LO PROF: Type: IMPLANTABLE DEVICE | Site: FOOT | Status: FUNCTIONAL

## 2022-04-22 DEVICE — SCREW CORTEX 3 X 18: Type: IMPLANTABLE DEVICE | Site: FOOT | Status: FUNCTIONAL

## 2022-04-22 DEVICE — SCREW BNE L20MM DIA3MM FT ANK TI VAR ANG LCK LO PROF: Type: IMPLANTABLE DEVICE | Site: FOOT | Status: FUNCTIONAL

## 2022-04-22 RX ORDER — LIDOCAINE HYDROCHLORIDE 20 MG/ML
INJECTION, SOLUTION EPIDURAL; INFILTRATION; INTRACAUDAL; PERINEURAL PRN
Status: DISCONTINUED | OUTPATIENT
Start: 2022-04-22 | End: 2022-04-22 | Stop reason: SDUPTHER

## 2022-04-22 RX ORDER — BUPIVACAINE HYDROCHLORIDE 2.5 MG/ML
INJECTION, SOLUTION EPIDURAL; INFILTRATION; INTRACAUDAL PRN
Status: DISCONTINUED | OUTPATIENT
Start: 2022-04-22 | End: 2022-04-22 | Stop reason: ALTCHOICE

## 2022-04-22 RX ORDER — MIDAZOLAM HYDROCHLORIDE 1 MG/ML
INJECTION INTRAMUSCULAR; INTRAVENOUS PRN
Status: DISCONTINUED | OUTPATIENT
Start: 2022-04-22 | End: 2022-04-22 | Stop reason: SDUPTHER

## 2022-04-22 RX ORDER — GLYCOPYRROLATE 1 MG/5 ML
SYRINGE (ML) INTRAVENOUS PRN
Status: DISCONTINUED | OUTPATIENT
Start: 2022-04-22 | End: 2022-04-22 | Stop reason: SDUPTHER

## 2022-04-22 RX ORDER — PROPOFOL 10 MG/ML
INJECTION, EMULSION INTRAVENOUS PRN
Status: DISCONTINUED | OUTPATIENT
Start: 2022-04-22 | End: 2022-04-22 | Stop reason: SDUPTHER

## 2022-04-22 RX ORDER — DEXAMETHASONE SODIUM PHOSPHATE 4 MG/ML
INJECTION, SOLUTION INTRA-ARTICULAR; INTRALESIONAL; INTRAMUSCULAR; INTRAVENOUS; SOFT TISSUE PRN
Status: DISCONTINUED | OUTPATIENT
Start: 2022-04-22 | End: 2022-04-22 | Stop reason: SDUPTHER

## 2022-04-22 RX ORDER — FENTANYL CITRATE 50 UG/ML
INJECTION, SOLUTION INTRAMUSCULAR; INTRAVENOUS PRN
Status: DISCONTINUED | OUTPATIENT
Start: 2022-04-22 | End: 2022-04-22 | Stop reason: SDUPTHER

## 2022-04-22 RX ORDER — SODIUM CHLORIDE 0.9 % (FLUSH) 0.9 %
5-40 SYRINGE (ML) INJECTION PRN
Status: DISCONTINUED | OUTPATIENT
Start: 2022-04-22 | End: 2022-04-22 | Stop reason: HOSPADM

## 2022-04-22 RX ORDER — HYDROCODONE BITARTRATE AND ACETAMINOPHEN 5; 325 MG/1; MG/1
1 TABLET ORAL EVERY 6 HOURS PRN
Qty: 20 TABLET | Refills: 0 | Status: SHIPPED | OUTPATIENT
Start: 2022-04-22 | End: 2022-04-29

## 2022-04-22 RX ORDER — SODIUM CHLORIDE 0.9 % (FLUSH) 0.9 %
5-40 SYRINGE (ML) INJECTION EVERY 12 HOURS SCHEDULED
Status: DISCONTINUED | OUTPATIENT
Start: 2022-04-22 | End: 2022-04-22 | Stop reason: HOSPADM

## 2022-04-22 RX ORDER — SODIUM CHLORIDE 9 MG/ML
25 INJECTION, SOLUTION INTRAVENOUS PRN
Status: DISCONTINUED | OUTPATIENT
Start: 2022-04-22 | End: 2022-04-22 | Stop reason: HOSPADM

## 2022-04-22 RX ORDER — ONDANSETRON 2 MG/ML
INJECTION INTRAMUSCULAR; INTRAVENOUS PRN
Status: DISCONTINUED | OUTPATIENT
Start: 2022-04-22 | End: 2022-04-22 | Stop reason: SDUPTHER

## 2022-04-22 RX ORDER — KETOROLAC TROMETHAMINE 30 MG/ML
INJECTION, SOLUTION INTRAMUSCULAR; INTRAVENOUS PRN
Status: DISCONTINUED | OUTPATIENT
Start: 2022-04-22 | End: 2022-04-22 | Stop reason: SDUPTHER

## 2022-04-22 RX ADMIN — KETOROLAC TROMETHAMINE 30 MG: 30 INJECTION, SOLUTION INTRAMUSCULAR; INTRAVENOUS at 09:19

## 2022-04-22 RX ADMIN — DEXAMETHASONE SODIUM PHOSPHATE 10 MG: 4 INJECTION INTRA-ARTICULAR; INTRALESIONAL; INTRAMUSCULAR; INTRAVENOUS; SOFT TISSUE at 08:19

## 2022-04-22 RX ADMIN — ONDANSETRON 4 MG: 2 INJECTION INTRAMUSCULAR; INTRAVENOUS at 08:19

## 2022-04-22 RX ADMIN — SODIUM CHLORIDE: 9 INJECTION, SOLUTION INTRAVENOUS at 08:05

## 2022-04-22 RX ADMIN — PROPOFOL 150 MG: 10 INJECTION, EMULSION INTRAVENOUS at 08:16

## 2022-04-22 RX ADMIN — FENTANYL CITRATE 25 MCG: 50 INJECTION, SOLUTION INTRAMUSCULAR; INTRAVENOUS at 08:43

## 2022-04-22 RX ADMIN — SODIUM CHLORIDE: 9 INJECTION, SOLUTION INTRAVENOUS at 09:29

## 2022-04-22 RX ADMIN — MIDAZOLAM 2 MG: 1 INJECTION INTRAMUSCULAR; INTRAVENOUS at 08:05

## 2022-04-22 RX ADMIN — Medication 2000 MG: at 08:22

## 2022-04-22 RX ADMIN — LIDOCAINE HYDROCHLORIDE 100 MG: 20 INJECTION, SOLUTION EPIDURAL; INFILTRATION; INTRACAUDAL; PERINEURAL at 08:16

## 2022-04-22 RX ADMIN — Medication 0.1 MG: at 08:29

## 2022-04-22 RX ADMIN — FENTANYL CITRATE 100 MCG: 50 INJECTION, SOLUTION INTRAMUSCULAR; INTRAVENOUS at 08:16

## 2022-04-22 ASSESSMENT — PULMONARY FUNCTION TESTS
PIF_VALUE: 15
PIF_VALUE: 3
PIF_VALUE: 15
PIF_VALUE: 2
PIF_VALUE: 6
PIF_VALUE: 15
PIF_VALUE: 2
PIF_VALUE: 14
PIF_VALUE: 5
PIF_VALUE: 10
PIF_VALUE: 2
PIF_VALUE: 5
PIF_VALUE: 15
PIF_VALUE: 14
PIF_VALUE: 15
PIF_VALUE: 14
PIF_VALUE: 0
PIF_VALUE: 1
PIF_VALUE: 15
PIF_VALUE: 0
PIF_VALUE: 14
PIF_VALUE: 15
PIF_VALUE: 3
PIF_VALUE: 15
PIF_VALUE: 2
PIF_VALUE: 15
PIF_VALUE: 15
PIF_VALUE: 2
PIF_VALUE: 15
PIF_VALUE: 15
PIF_VALUE: 0
PIF_VALUE: 15
PIF_VALUE: 2
PIF_VALUE: 10
PIF_VALUE: 0
PIF_VALUE: 0
PIF_VALUE: 3
PIF_VALUE: 15
PIF_VALUE: 3
PIF_VALUE: 5
PIF_VALUE: 2
PIF_VALUE: 15
PIF_VALUE: 6
PIF_VALUE: 15
PIF_VALUE: 2
PIF_VALUE: 15
PIF_VALUE: 2
PIF_VALUE: 14
PIF_VALUE: 15
PIF_VALUE: 15
PIF_VALUE: 14
PIF_VALUE: 5
PIF_VALUE: 1
PIF_VALUE: 15
PIF_VALUE: 5
PIF_VALUE: 15
PIF_VALUE: 3
PIF_VALUE: 1
PIF_VALUE: 15
PIF_VALUE: 2
PIF_VALUE: 3
PIF_VALUE: 15
PIF_VALUE: 2
PIF_VALUE: 15
PIF_VALUE: 15
PIF_VALUE: 1
PIF_VALUE: 15
PIF_VALUE: 2
PIF_VALUE: 15
PIF_VALUE: 5
PIF_VALUE: 15
PIF_VALUE: 5

## 2022-04-22 NOTE — OP NOTE
Operative Note      Patient: Juana Hector  YOB: 1965  MRN: [id number]    Date of Procedure: 4/22/2022    Pre-Op Diagnosis: HALLUX RIGIDUS LEFT FOOT    Post-Op Diagnosis: Same       Procedure(s):  ARTHRODESIS FIRST DIGIT LEFT FOOT    Surgeon(s):  Farnaz Jeter DPM    Assistant:   Resident: Chayito Garrison DPM    Anesthesia: General    Estimated Blood Loss (mL): Minimal    Complications: None    Specimens:   * No specimens in log *    Implants:  Implant Name Type Inv. Item Serial No.  Lot No. LRB No. Used Action   GRAFT BONE 2.5CC HCT/P 361 REGULATED VIABLE CELLULAR - [id number]  GRAFT BONE 2.5CC HCT/P 361 REGULATED VIABLE CELLULAR [id number] [employer]  Left 1 Implanted   ARTHREX 3.0 X20 KREULOCK      Left 1 Implanted   ARTHREX 3.0 X 18 KREULOCK      Left 1 Implanted   ARTHREX 3.0X16 KREULOCK      Left 1 Implanted   SCREW CORTEX 3 X 18 - [id number]  SCREW CORTEX 3 X 18  [employer]WD  Left 1 Implanted   ARTHREX 0 DEGREE L STANDARD    [employer]-PMM  Left 1 Implanted   SCREW BNE L18MM DIA3MM FT ANK TI MARY ANG LCK LO PROF - [id number]  SCREW BNE L18MM DIA3MM FT ANK TI MARY ANG LCK LO PROF  [employer]  Left 1 Implanted   SCREW BNE L20MM DIA3MM FT ANK TI MARY ANG LCK LO PROF - [id number]  SCREW BNE L20MM DIA3MM FT ANK TI MARY ANG LCK LO PROF  [employer]WD  Left 1 Implanted         Drains: * No LDAs found *    Findings: see op report    Detailed Description of Procedure:     After interfacing with all appropriate monitors in the preop holding area, the patient was transferred from preoperative holding area to the operating room and transferred onto the operating room table and laid in the supine position. General anesthesia was then administered by the anesthesia team.  Once the patient was under the proper plane of anesthesia, a well-padded pneumatic ankle tourniquet was then applied to the left lower extremity.   The left lower extremity was then scrubbed prepped and draped in the usual aseptic manner and  on the operative field. The tourniquet was also inflated to 250 mmHg at this time. Attention was then directed to the dorsomedial aspect of the left first metatarsal phalangeal joint space. #15 blade was utilized to make the incision through the skin careful and careful anatomical dissection was then carried down through the subcutaneous, fascial and capsular layers in order to adequately expose the first metatarsal phalangeal joint space. The joint was then adequately freed from all respective soft tissue attachments. A K wire was then thrown centrally down the first metatarsal in order to provide guide for concave reamer. The first metatarsal head was then reamed in order to resect bone down level of yellow subchondral bone. The same was done with another K wire from through the proximal phalanx of the hallux using a convex reamer. First metatarsal head and the base of the proximal phalanx were then fenestrated several times using a K wire. At this time the Arthrex MaxForce plate was then rested anatomically across the first metatarsal phalangeal joint space resting dorsally. The plate was then secured using BB pins. The first metatarsal phalangeal joint space was then temporary fixated using a K wire thrown distal lateral to proximal medial as well. First metatarsophalangeal joint space was then packed with Arthrex Arthrocell bone graft. The plate was then secured distally filling all holes with combination of both locking and nonlocking screws. The plates inherent locking mechanism was then utilized in order to provide excellent compression across the first metatarsophalangeal joint space. After the joint was compressed the proximal plate holes were then filled with combination of locking and nonlocking screws as well. Excellent anatomic alignment was appreciable under fluoroscopic guidance.   Under simulated weightbearing it was noted that the hallux did have adequate purchase of the weightbearing surface. Surgical site was then flushed with copious amounts of normal sterile saline. The capsular layer was then reapproximated using 2-0 Vicryl, the subcutaneous tissues were then reapproximated using 2-0 Vicryl as well. The skin was then reapproximated using a running 3-0 Monocryl. Postop block was then administered to the left foot consisting of 20 cc of 0.5% Marcaine plain. A sterile postoperative dressing consisting of Arthrex jumpstart dressing, 4 x 4 gauze, Kerlix and Ace bandage was then applied to the left foot. The tourniquet was then deflated at this time. It was deemed that the patient tolerated both the procedure and the anesthesia very well and was transferred to PACU with vital signs stable and vascular status intact water to the left foot. Short period of postoperative monitoring she will be discharged home. She has been dispensed clearly written postoperative instructions with respect to postop care and her weightbearing status. She is to follow-up with Dr. Cecelia Ang in 1 weeks time.          Electronically signed by Sara Kamara DPM on 4/22/2022 at 11:41 AM

## 2022-04-22 NOTE — ANESTHESIA PRE PROCEDURE
Department of Anesthesiology  Preprocedure Note       Name:  Lilia Sawant   Age:  60 y.o.  :  1965                                          MRN:  [id number]         Date:  2022      Surgeon: Julieta Gil):  Shavonne Altman DPM    Procedure: Procedure(s):  ARTHRODESIS FIRST DIGIT LEFT FOOT  ++ARTHREX++    Medications prior to admission:   Prior to Admission medications    Medication Sig Start Date End Date Taking?  Authorizing Provider   busPIRone (BUSPAR) 5 MG tablet Take 1 tablet by mouth twice daily 22   Shirley Pike, DO   amLODIPine (NORVASC) 10 MG tablet Take 1 tablet by mouth once daily 22   Shirley , DO   metoprolol tartrate (LOPRESSOR) 50 MG tablet Take 1 tablet by mouth twice daily 22   Shirley , DO   losartan (COZAAR) 50 MG tablet Take 2 tablets by mouth once daily 22   Shirley , DO   NONFORMULARY Indications: CBD oil     Historical Provider, MD   diclofenac (CATAFLAM) 50 MG tablet Take 1 tablet by mouth 2 times daily 21   Shavonne Altman DPM   diclofenac sodium (VOLTAREN) 1 % GEL Apply 4 g topically 2 times daily 21   Shirley Pike, DO   buPROPion (WELLBUTRIN XL) 150 MG extended release tablet TAKE ONE TABLET BY MOUTH EVERY MORNING 12/15/20   [facility]   losartan (COZAAR) 100 MG tablet TAKE ONE TABLET BY MOUTH EVERY DAY 12/15/20   [facility], DO   metoprolol tartrate (LOPRESSOR) 50 MG tablet TAKE ONE TABLET BY MOUTH TWO TIMES A DAY 12/15/20   [facility], [facility]   amLODIPine (NORVASC) 10 MG tablet TAKE ONE TABLET BY MOUTH EVERY DAY 12/15/20   [facility], DO   busPIRone (BUSPAR) 5 MG tablet TAKE ONE TABLET BY MOUTH TWO TIMES A DAY 12/15/20   [facility], DO   aspirin 81 MG tablet Take 81 mg by mouth daily    Historical Provider, MD   Potassium 99 MG TABS Take 99 mg by mouth     Historical Provider, MD       Current medications:    Current Facility-Administered Medications   Medication Dose Route Frequency Provider Last Rate Last Admin    0.9 % sodium chloride infusion  25 mL IntraVENous PRN Luis E Colbert, DPM        ceFAZolin (ANCEF) 2000 mg in sterile water 20 mL IV syringe  2,000 mg IntraVENous On Call to Alice White 272, DPM        sodium chloride flush 0.9 % injection 5-40 mL  5-40 mL IntraVENous 2 times per day Luis E Colbert, DPM        sodium chloride flush 0.9 % injection 5-40 mL  5-40 mL IntraVENous PRN Luis E Colbert, DPM           Allergies:  No Known Allergies    Problem List:    Patient Active Problem List   Diagnosis Code    Essential hypertension I10    Myalgia M79.10    Fatigue R53.83    Primary osteoarthritis of both knees M17.0    Primary osteoarthritis of both hips M16.0    Polyarthritis M13.0    BELEN positive R76.8    Raised antibody titer R76.0    Chronic left shoulder pain M25.512, G89.29    Current moderate episode of major depressive disorder without [facility] F32.1    Heel spur, left M77.32    Class 2 severe obesity due to excess calories with serious comorbidity and body mass index (BMI) of 39.0 to 39.9 in adult ([facility] E66.01, Z68.39       Past Medical History:        Diagnosis Date    Arthritis     osteoarthritis    Essential hypertension 2017       Past Surgical History:        Procedure Laterality Date   [address] [facility] [address]      then reversed in        Social History:    Social History     Tobacco Use    Smoking status: Former Smoker     Packs/day: 0.50     Years: 35.00     Pack years: 17.50     Types: Cigarettes     Quit date: 2009     Years since quittin.9    Smokeless tobacco: Never Used   Substance Use Topics    Alcohol use: Yes     Comment: socially                                Counseling given: Not Answered      Vital Signs (Current):   Vitals:    22 1241 22 0700   BP:  (!) 163/70   Pulse:  57   Resp:  16   Temp:  98.3 °F (36.8 °C)   TempSrc:  Tympanic   SpO2:  97%   Weight: 218 lb (98.9 kg)    Height: 5' 4\" (1.626 m) BP Readings from Last 3 Encounters:   04/22/22 (!) 163/70   03/24/22 124/86   02/24/22 (!) 142/82       NPO Status: Time of last liquid consumption: 1900 (sips w meds 0500)                        Time of last solid consumption: 1900                        Date of last liquid consumption: 04/21/22                        Date of last solid food consumption: 04/21/22    BMI:   Wt Readings from Last 3 Encounters:   04/18/22 218 lb (98.9 kg)   03/24/22 225 lb (102.1 kg)   02/24/22 228 lb (103.4 kg)     Body mass index is 37.42 kg/m². CBC:   Lab Results   Component Value Date    WBC 9.4 03/24/2022    RBC 5.30 03/24/2022    HGB 14.5 03/24/2022    HCT 46.1 03/24/2022    MCV 87.0 03/24/2022    RDW 13.9 03/24/2022     03/24/2022       CMP:   Lab Results   Component Value Date     03/24/2022    K 4.4 03/24/2022     03/24/2022    CO2 24 03/24/2022    BUN 18 03/24/2022    CREATININE 0.8 03/24/2022    GFRAA >60 03/24/2022    LABGLOM >60 03/24/2022    GLUCOSE 86 03/24/2022    PROT 7.4 03/24/2022    CALCIUM 9.9 03/24/2022    BILITOT 0.4 03/24/2022    ALKPHOS 55 03/24/2022    AST 28 03/24/2022    ALT 25 03/24/2022       POC Tests: No results for input(s): POCGLU, POCNA, POCK, POCCL, POCBUN, POCHEMO, POCHCT in the last 72 hours.     Coags: No results found for: PROTIME, INR, APTT    HCG (If Applicable): No results found for: PREGTESTUR, PREGSERUM, HCG, HCGQUANT     ABGs: No results found for: PHART, PO2ART, RDP1XHB, DXX3CYC, BEART, L2IOSFFF     Type & Screen (If Applicable):  No results found for: LABABO, LABRH    Drug/Infectious Status (If Applicable):  No results found for: HIV, HEPCAB    COVID-19 Screening (If Applicable):   Lab Results   Component Value Date    COVID19 Detected 09/14/2021           Anesthesia Evaluation  Patient summary reviewed no history of anesthetic complications:   Airway: Mallampati: III  TM distance: >3 FB   Neck ROM: full  Mouth opening: < 3 FB Dental:      Comment: Front upper chipped tooth    Pulmonary: breath sounds clear to auscultation                            ROS comment: Former smoker    Cardiovascular:    (+) hypertension:,         Rhythm: regular             Beta Blocker:  Dose within 24 Hrs         Neuro/Psych:   (+) neuromuscular disease:, psychiatric history:             ROS comment: Myalgia  Fatigue   GI/Hepatic/Renal:             Endo/Other:    (+) : arthritis:., .                 Abdominal:             Vascular: negative vascular ROS. Other Findings:           Anesthesia Plan      general     ASA 2       Induction: intravenous. MIPS: Postoperative opioids intended and Prophylactic antiemetics administered. Anesthetic plan and risks discussed with patient. Plan discussed with CRNA. 304 José Luis Prather DO   4/22/2022     DOS STAFF ADDENDUM:    Pt seen and examined, physical exam updated, chart reviewed including anesthesia, drug and allergy history. H&P reviewed. No interval changes to history or physical examination (unless noted above). NPO status confirmed. Anesthetic plan, risks, benefits, alternatives discussed with patient. Patient verbalized an understanding and agrees to proceed. 304 José Luis Prather DO  Staff Anesthesiologist  7:24 AM      Chart reviewed, patient seen. Agree with above assessment.     MAGALIE Johnston CRNA  4/22/22  08:00 AM

## 2022-04-22 NOTE — ANESTHESIA POSTPROCEDURE EVALUATION
Department of Anesthesiology  Postprocedure Note    Patient: Aubree Purvis  MRN: [id number]  YOB: 1965  Date of evaluation: 4/22/2022  Time:  11:25 AM     Procedure Summary     Date: 04/22/22 Room / Location: [facility] OR 05 / [facility]    Anesthesia Start: 6893 Anesthesia Stop: 8601    Procedure: ARTHRODESIS FIRST DIGIT LEFT FOOT (Left Foot) Diagnosis: (HALLUX RIGIDUS LEFT FIRST)    Surgeons: John Bolaños DPM Responsible Provider: Edgar Gilliam DO    Anesthesia Type: general ASA Status: 2          Anesthesia Type: general    Lenore Phase I: Lenore Score: 10    Lenore Phase II: Lenore Score: 10    Last vitals: Reviewed and per EMR flowsheets.        Anesthesia Post Evaluation    Patient location during evaluation: PACU  Patient participation: complete - patient participated  Level of consciousness: awake and alert  Airway patency: patent  Nausea & Vomiting: no nausea and no vomiting  Complications: no  Cardiovascular status: hemodynamically stable  Respiratory status: acceptable  Hydration status: euvolemic

## 2022-04-22 NOTE — BRIEF OP NOTE
Brief Postoperative Note      Patient: Saba Obrien  YOB: 1965  MRN: [id number]    Date of Procedure: 4/22/2022    Pre-Op Diagnosis: HALLUX RIGIDUS LEFT FIRST    Post-Op Diagnosis: Same       Procedure(s):  ARTHRODESIS FIRST DIGIT LEFT FOOT    Surgeon(s):  Rosalinda Ignacio DPM    Assistant:  Resident: Chad Alaniz DPM    Anesthesia: General    Estimated Blood Loss (mL): Minimal    Complications: None    Specimens:   * No specimens in log *    Implants:  Implant Name Type Inv. Item Serial No.  Lot No. LRB No. Used Action   GRAFT BONE 2.5CC HCT/P 361 REGULATED VIABLE CELLULAR - [id number]  GRAFT BONE 2.5CC HCT/P 361 REGULATED VIABLE CELLULAR [id number] [employer]  Left 1 Implanted   ARTHREX 3.0 X20 KREULOCK      Left 1 Implanted   ARTHREX 3.0 X 18 KREULOCK      Left 1 Implanted   ARTHREX 3.0X16 KREULOCK      Left 1 Implanted   SCREW CORTEX 3 X 18 - [id number]  SCREW CORTEX 3 X 18  [employer]WD  Left 1 Implanted   ARTHREX 0 DEGREE L STANDARD    [employer]PMM  Left 1 Implanted   SCREW BNE L18MM DIA3MM FT ANK TI MARY ANG LCK LO PROF - [id number]  SCREW BNE L18MM DIA3MM FT ANK TI MARY ANG LCK LO PROF  [employer]WD  Left 1 Implanted   SCREW BNE L20MM DIA3MM FT ANK TI MARY ANG LCK LO PROF - [id number]  SCREW BNE L20MM DIA3MM FT ANK TI MARY ANG LCK LO PROF  [employer]WD  Left 1 Implanted         Drains: * No LDAs found *    Findings: see op report. Tolerated surgery well.        Electronically signed by Chad Alaniz DPM on 4/22/2022 at 9:44 AM

## 2022-04-26 ENCOUNTER — OFFICE VISIT (OUTPATIENT)
Dept: PODIATRY | Age: 57
End: 2022-04-26

## 2022-04-26 VITALS — WEIGHT: 218 LBS | BODY MASS INDEX: 37.42 KG/M2 | TEMPERATURE: 98.2 F

## 2022-04-26 DIAGNOSIS — Z09 POSTOPERATIVE EXAMINATION: Primary | ICD-10-CM

## 2022-04-26 PROCEDURE — 99024 POSTOP FOLLOW-UP VISIT: CPT | Performed by: PODIATRIST

## 2022-04-26 NOTE — PROGRESS NOTES
Postop Progress Note    Subjective    Jason Bryant presents to the office 4 days  following bunion . Eating a regular diet without difficulty. Objective    Vitals:    04/26/22 1606   Temp: 98.2 °F (36.8 °C)     General: alert, cooperative and no distress  Incision: healing well, no drainage, no erythema, no hernia, no seroma, no swelling, well approximated    Assessment    Doing well postoperatively. Plan   1. Continue any current medications  2. Wound care discussed  3. Wound/Incision: healing well  4. Disposition:   Limited activities. No heavy lifting. No strenuous exercise. Should not return to gym class or sports until cleared by a physician. 5. Diet: regular diet  6. Follow up: 1 week.            Electronically signed by Renae Curtis DPM on 4/26/2022 at 4:11 PM

## 2022-05-05 ENCOUNTER — OFFICE VISIT (OUTPATIENT)
Dept: PODIATRY | Age: 57
End: 2022-05-05

## 2022-05-05 VITALS — WEIGHT: 218 LBS | TEMPERATURE: 97.3 F | BODY MASS INDEX: 37.42 KG/M2

## 2022-05-05 DIAGNOSIS — Z09 POSTOPERATIVE EXAMINATION: Primary | ICD-10-CM

## 2022-05-05 PROCEDURE — 99024 POSTOP FOLLOW-UP VISIT: CPT | Performed by: PODIATRIST

## 2022-05-05 NOTE — PROGRESS NOTES
Postop Progress Note    Subjective    Bulzoltan Hazard presents to the office 2 weeks  following arthrodesis  1st mpj. The patient is not having any pain. Objective    Vitals:    05/05/22 1639   Temp: 97.3 °F (36.3 °C)     General: alert, cooperative and no distress  Incision: healing well, well approximated    Assessment    Doing well postoperatively. Plan   1. Continue any current medications  2. Wound care discussed  3. Wound/Incision: healing well, no drainage, no erythema, no hernia, no seroma, no swelling, sutures removed  4. Disposition:   Limited activities. No heavy lifting. No strenuous exercise. Should not return to gym class or sports until cleared by a physician. 5. Diet: regular diet  6. Follow up: 2 weeks. XR FOOT LEFT (MIN 3 VIEWS)    Result Date: 4/26/2022  EXAMINATION: THREE XRAY VIEWS OF THE LEFT FOOT 4/26/2022 4:04 pm COMPARISON: Left foot series from February 24, 2022 HISTORY: ORDERING SYSTEM PROVIDED HISTORY: Postoperative examination TECHNOLOGIST PROVIDED HISTORY: Reason for exam:->non wb FINDINGS: Interval postprocedural changes to the left large toe metatarsal phalangeal joint. Plate and screw fixation without obvious complication. Anterior and posterior calcaneal enthesophytes. Mild dorsal midfoot degenerative spurring. Mild tibiotalar joint degenerative spurring. Osseous mineralization appears normal.     Interval postprocedural changes to the left large toe metatarsal phalangeal joint. No complicating features. Dorsal midfoot and tibiotalar joint osteoarthritis. Calcaneal enthesophytes. FLUORO FOR SURGICAL PROCEDURES    Result Date: 4/22/2022  EXAMINATION: SPOT FLUOROSCOPIC IMAGES 4/22/2022 8:42 am TECHNIQUE: Fluoroscopy was provided by the radiology department for procedure. Radiologist was not present during examination. FLUOROSCOPY DOSE AND TYPE OR TIME AND EXPOSURES: Fluoroscopy time equals 2 minutes and 29 seconds.   Total dose equals 2.29 mGy COMPARISON: None HISTORY: ORDERING SYSTEM PROVIDED HISTORY: lt. foot TECHNOLOGIST PROVIDED HISTORY: Reason for exam:->lt. foot Intraprocedural imaging. FINDINGS: 10 spot images of the foot were obtained. Intraprocedural fluoroscopic spot images as above. See separate procedure report for more information.          Electronically signed by Hamida Hernandez DPM on 5/5/2022 at 4:49 PM

## 2022-05-19 ENCOUNTER — OFFICE VISIT (OUTPATIENT)
Dept: PODIATRY | Age: 57
End: 2022-05-19

## 2022-05-19 VITALS — BODY MASS INDEX: 37.42 KG/M2 | WEIGHT: 218 LBS | TEMPERATURE: 97.3 F

## 2022-05-19 DIAGNOSIS — Z09 POSTOPERATIVE EXAMINATION: Primary | ICD-10-CM

## 2022-05-19 PROCEDURE — 99024 POSTOP FOLLOW-UP VISIT: CPT | Performed by: PODIATRIST

## 2022-05-19 NOTE — PROGRESS NOTES
Postop Progress Note    Subjective    Tika He presents to the office 4 weeks  following fusion toe . The patient is not having any pain. Objective    Vitals:    05/19/22 1508   Temp: 97.3 °F (36.3 °C)     General: alert, cooperative and no distress  Incision: healing well, well approximated    Assessment    Doing well postoperatively. Plan   1. Continue any current medications  2. Wound care discussed  3. Wound/Incision: healing well, no drainage, no erythema, no hernia, no seroma, no swelling, well approximated  4. Disposition:   Limited activities. No heavy lifting. No strenuous exercise. Should not return to gym class or sports until cleared by a physician. 5. Diet: regular diet  6. Follow up: 2 weeks. XR FOOT LEFT (MIN 3 VIEWS)    Result Date: 5/9/2022  EXAMINATION: THREE XRAY VIEWS OF THE LEFT FOOT 5/5/2022 3:47 pm COMPARISON: 26 April 2022 HISTORY: ORDERING SYSTEM PROVIDED HISTORY: Postoperative examination TECHNOLOGIST PROVIDED HISTORY: Reason for exam:->non wb FINDINGS: Intact fusion hardware at the 1st MTP joint. Heel spurs redemonstrated. No new abnormal findings. Intact fusion hardware at the 1st MTP joint. Heel spurs. No new abnormal findings. XR FOOT LEFT (MIN 3 VIEWS)    Result Date: 4/26/2022  EXAMINATION: THREE XRAY VIEWS OF THE LEFT FOOT 4/26/2022 4:04 pm COMPARISON: Left foot series from February 24, 2022 HISTORY: ORDERING SYSTEM PROVIDED HISTORY: Postoperative examination TECHNOLOGIST PROVIDED HISTORY: Reason for exam:->non wb FINDINGS: Interval postprocedural changes to the left large toe metatarsal phalangeal joint. Plate and screw fixation without obvious complication. Anterior and posterior calcaneal enthesophytes. Mild dorsal midfoot degenerative spurring. Mild tibiotalar joint degenerative spurring. Osseous mineralization appears normal.     Interval postprocedural changes to the left large toe metatarsal phalangeal joint. No complicating features. Dorsal midfoot and tibiotalar joint osteoarthritis. Calcaneal enthesophytes. FLUORO FOR SURGICAL PROCEDURES    Result Date: 4/22/2022  EXAMINATION: SPOT FLUOROSCOPIC IMAGES 4/22/2022 8:42 am TECHNIQUE: Fluoroscopy was provided by the radiology department for procedure. Radiologist was not present during examination. FLUOROSCOPY DOSE AND TYPE OR TIME AND EXPOSURES: Fluoroscopy time equals 2 minutes and 29 seconds. Total dose equals 2.29 mGy COMPARISON: None HISTORY: ORDERING SYSTEM PROVIDED HISTORY: lt. foot TECHNOLOGIST PROVIDED HISTORY: Reason for exam:->lt. foot Intraprocedural imaging. FINDINGS: 10 spot images of the foot were obtained. Intraprocedural fluoroscopic spot images as above. See separate procedure report for more information.          Electronically signed by Hamida Hernandez DPM on 5/19/2022 at 4:00 PM

## 2022-05-31 RX ORDER — DICLOFENAC POTASSIUM 50 MG/1
TABLET, FILM COATED ORAL
Qty: 90 TABLET | Refills: 0 | Status: SHIPPED
Start: 2022-05-31 | End: 2022-06-30

## 2022-06-16 ENCOUNTER — OFFICE VISIT (OUTPATIENT)
Dept: PODIATRY | Age: 57
End: 2022-06-16

## 2022-06-16 VITALS — TEMPERATURE: 98.2 F | WEIGHT: 218 LBS | BODY MASS INDEX: 37.42 KG/M2

## 2022-06-16 DIAGNOSIS — Z09 POSTOPERATIVE EXAMINATION: Primary | ICD-10-CM

## 2022-06-16 PROCEDURE — 99024 POSTOP FOLLOW-UP VISIT: CPT | Performed by: PODIATRIST

## 2022-06-16 NOTE — PROGRESS NOTES
Postop Progress Note    Subjective    Simon Rodriguez presents to the office 7 weeks  following foot sx . Patient reports wound healing well. The patient is not having any pain. Objective    Vitals:    06/16/22 1412   Temp: 98.2 °F (36.8 °C)     General: alert, cooperative and no distress  Incision: healing well, no drainage, no erythema, no hernia, no seroma, no swelling, well approximated    Assessment    Doing well postoperatively. Plan   1. Continue any current medications  2. Wound care discussed  3. Wound/Incision: well approximated  4. Disposition:   Normal activities with no limitations. No heavy lifting. No strenuous exercise. May return to work on 6 weeks . 5. Diet: encourage fluids  6. Follow up: 6 weeks.            Electronically signed by Liza Pepper DPM on 6/16/2022 at 2:40 PM

## 2022-06-30 RX ORDER — DICLOFENAC POTASSIUM 50 MG/1
TABLET, FILM COATED ORAL
Qty: 90 TABLET | Refills: 0 | Status: SHIPPED
Start: 2022-06-30 | End: 2022-08-15

## 2022-07-01 RX ORDER — METOPROLOL TARTRATE 50 MG/1
TABLET, FILM COATED ORAL
Qty: 180 TABLET | Refills: 0 | Status: SHIPPED
Start: 2022-07-01 | End: 2022-09-26

## 2022-07-01 RX ORDER — BUSPIRONE HYDROCHLORIDE 5 MG/1
TABLET ORAL
Qty: 180 TABLET | Refills: 0 | Status: SHIPPED
Start: 2022-07-01 | End: 2022-09-26

## 2022-07-01 RX ORDER — LOSARTAN POTASSIUM 50 MG/1
TABLET ORAL
Qty: 180 TABLET | Refills: 0 | Status: SHIPPED
Start: 2022-07-01 | End: 2022-09-26

## 2022-07-01 RX ORDER — AMLODIPINE BESYLATE 10 MG/1
TABLET ORAL
Qty: 90 TABLET | Refills: 0 | Status: SHIPPED
Start: 2022-07-01 | End: 2022-09-26

## 2022-07-01 RX ORDER — BUPROPION HYDROCHLORIDE 150 MG/1
TABLET ORAL
Qty: 90 TABLET | Refills: 0 | Status: SHIPPED
Start: 2022-07-01 | End: 2022-09-26

## 2022-08-04 ENCOUNTER — OFFICE VISIT (OUTPATIENT)
Dept: PODIATRY | Age: 57
End: 2022-08-04

## 2022-08-04 VITALS — TEMPERATURE: 98.2 F | WEIGHT: 218 LBS | BODY MASS INDEX: 37.42 KG/M2

## 2022-08-04 DIAGNOSIS — Z09 POSTOPERATIVE EXAMINATION: Primary | ICD-10-CM

## 2022-08-04 PROCEDURE — 99024 POSTOP FOLLOW-UP VISIT: CPT | Performed by: PODIATRIST

## 2022-08-04 NOTE — PROGRESS NOTES
Postop Progress Note    Subjective    Al Grams presents to the office 4 months  following foot sx . The patient is not having any pain. Objective    Vitals:    08/04/22 1300   Temp: 98.2 °F (36.8 °C)     General: alert, cooperative, and no distress  Incision: healing well    Assessment    Doing well postoperatively. Plan   1. Continue any current medications  2. Wound care discussed  3. Wound/Incision: healing well  4. Disposition:   Pt is to increase activities as tolerated. Normal activities with no limitations. May return to full duty immediately with no restrictions. 5. Diet: regular diet  6. Follow up: 8 weeks. No results found.         Electronically signed by Eloy Ortega DPM on 8/4/2022 at 1:25 PM

## 2022-08-04 NOTE — LETTER
[place] [address]  Phone: [phone]  Fax: [phone] Cameron Wiley DPM        August 4, 2022     Patient: Duane Alvarenga   YOB: 1965   Date of Visit: 8/4/2022       To Whom it May Concern:    Gurwinder Prado was seen in my clinic on 8/4/2022. She can return to work on 8/9/2022 with no restrictions per Dr. Fausto Ward    If you have any questions or concerns, please don't hesitate to call.     Sincerely,         Bobby Vazquez DPM

## 2022-08-15 RX ORDER — DICLOFENAC POTASSIUM 50 MG/1
TABLET, FILM COATED ORAL
Qty: 90 TABLET | Refills: 0 | Status: SHIPPED
Start: 2022-08-15 | End: 2022-09-28

## 2022-09-26 RX ORDER — LOSARTAN POTASSIUM 50 MG/1
TABLET ORAL
Qty: 180 TABLET | Refills: 0 | Status: SHIPPED
Start: 2022-09-26 | End: 2022-09-28

## 2022-09-26 RX ORDER — BUSPIRONE HYDROCHLORIDE 5 MG/1
TABLET ORAL
Qty: 180 TABLET | Refills: 0 | Status: SHIPPED
Start: 2022-09-26 | End: 2022-09-26 | Stop reason: SDUPTHER

## 2022-09-26 RX ORDER — BUSPIRONE HYDROCHLORIDE 5 MG/1
TABLET ORAL
Qty: 180 TABLET | Refills: 1 | Status: SHIPPED | OUTPATIENT
Start: 2022-09-26

## 2022-09-26 RX ORDER — AMLODIPINE BESYLATE 10 MG/1
TABLET ORAL
Qty: 90 TABLET | Refills: 0 | Status: SHIPPED
Start: 2022-09-26 | End: 2022-09-28

## 2022-09-26 RX ORDER — BUPROPION HYDROCHLORIDE 150 MG/1
TABLET ORAL
Qty: 90 TABLET | Refills: 0 | Status: SHIPPED
Start: 2022-09-26 | End: 2022-09-28

## 2022-09-26 RX ORDER — METOPROLOL TARTRATE 50 MG/1
TABLET, FILM COATED ORAL
Qty: 180 TABLET | Refills: 0 | Status: SHIPPED
Start: 2022-09-26 | End: 2022-09-26 | Stop reason: SDUPTHER

## 2022-09-26 RX ORDER — METOPROLOL TARTRATE 50 MG/1
TABLET, FILM COATED ORAL
Qty: 180 TABLET | Refills: 1 | Status: SHIPPED | OUTPATIENT
Start: 2022-09-26

## 2022-09-28 RX ORDER — BUPROPION HYDROCHLORIDE 150 MG/1
TABLET ORAL
Qty: 90 TABLET | Refills: 0 | Status: SHIPPED | OUTPATIENT
Start: 2022-09-28

## 2022-09-28 RX ORDER — AMLODIPINE BESYLATE 10 MG/1
TABLET ORAL
Qty: 90 TABLET | Refills: 0 | Status: SHIPPED | OUTPATIENT
Start: 2022-09-28

## 2022-09-28 RX ORDER — LOSARTAN POTASSIUM 50 MG/1
TABLET ORAL
Qty: 180 TABLET | Refills: 0 | Status: SHIPPED | OUTPATIENT
Start: 2022-09-28

## 2022-09-28 RX ORDER — DICLOFENAC POTASSIUM 50 MG/1
TABLET, FILM COATED ORAL
Qty: 90 TABLET | Refills: 0 | Status: SHIPPED | OUTPATIENT
Start: 2022-09-28

## 2022-11-21 RX ORDER — DICLOFENAC POTASSIUM 50 MG/1
TABLET, FILM COATED ORAL
Qty: 90 TABLET | Refills: 0 | Status: SHIPPED | OUTPATIENT
Start: 2022-11-21

## 2022-12-14 ENCOUNTER — OFFICE VISIT (OUTPATIENT)
Dept: FAMILY MEDICINE CLINIC | Age: 57
End: 2022-12-14
Payer: COMMERCIAL

## 2022-12-14 VITALS
TEMPERATURE: 97.9 F | BODY MASS INDEX: 37.42 KG/M2 | WEIGHT: 218 LBS | OXYGEN SATURATION: 97 % | DIASTOLIC BLOOD PRESSURE: 80 MMHG | HEART RATE: 76 BPM | SYSTOLIC BLOOD PRESSURE: 118 MMHG

## 2022-12-14 DIAGNOSIS — J01.90 ACUTE BACTERIAL SINUSITIS: Primary | ICD-10-CM

## 2022-12-14 DIAGNOSIS — B96.89 ACUTE BACTERIAL SINUSITIS: Primary | ICD-10-CM

## 2022-12-14 PROCEDURE — 99213 OFFICE O/P EST LOW 20 MIN: CPT | Performed by: FAMILY MEDICINE

## 2022-12-14 PROCEDURE — 3078F DIAST BP <80 MM HG: CPT | Performed by: FAMILY MEDICINE

## 2022-12-14 PROCEDURE — 3074F SYST BP LT 130 MM HG: CPT | Performed by: FAMILY MEDICINE

## 2022-12-14 RX ORDER — AMOXICILLIN 875 MG/1
875 TABLET, COATED ORAL 2 TIMES DAILY
Qty: 14 TABLET | Refills: 0 | Status: SHIPPED | OUTPATIENT
Start: 2022-12-14 | End: 2022-12-21

## 2022-12-14 RX ORDER — METHYLPREDNISOLONE 4 MG/1
TABLET ORAL
Qty: 1 KIT | Refills: 0 | Status: SHIPPED | OUTPATIENT
Start: 2022-12-14

## 2022-12-14 ASSESSMENT — ENCOUNTER SYMPTOMS
SORE THROAT: 1
GASTROINTESTINAL NEGATIVE: 1
RESPIRATORY NEGATIVE: 1
SINUS PRESSURE: 1
SINUS PAIN: 1
EYES NEGATIVE: 1
TROUBLE SWALLOWING: 0

## 2022-12-14 NOTE — PROGRESS NOTES
Matt Stephen (:  1965) is a 59 y.o. female,Established patient, here for evaluation of the following chief complaint(s):  Pharyngitis and Headache         ASSESSMENT/PLAN:  1. Acute bacterial sinusitis  -     amoxicillin (AMOXIL) 875 MG tablet; Take 1 tablet by mouth 2 times daily for 7 days, Disp-14 tablet, R-0Normal  -     methylPREDNISolone (MEDROL DOSEPACK) 4 MG tablet; Take by mouth., Disp-1 kit, R-0Normal  This time we will treat symptomatically. Follow-up with PCP in 1 to 2 weeks to ensure resolution. Red flags discussed with patient if these occur she is to go directly to the nearest emergency department. No follow-ups on file. Subjective   SUBJECTIVE/OBJECTIVE:  Pharyngitis  Associated symptoms include congestion, headaches and a sore throat. Pertinent negatives include no fever, neck pain or rash. Headache  Patient presents today for several week history of worsening pharyngitis and headache. Denies fever or chills. Denies any chest pain or shortness of breath. Denies any known sick contacts or recent travel but does work at [employer]. Denies any nausea vomiting or diarrhea. Review of Systems   Constitutional:  Negative for fever. HENT:  Positive for congestion, postnasal drip, sinus pressure, sinus pain and sore throat. Negative for trouble swallowing. Eyes: Negative. Respiratory: Negative. Cardiovascular: Negative. Gastrointestinal: Negative. Musculoskeletal:  Negative for neck pain. Skin:  Negative for rash. Neurological:  Positive for headaches. Hematological:  Negative for adenopathy. All other systems reviewed and are negative.        Current Outpatient Medications:     amoxicillin (AMOXIL) 875 MG tablet, Take 1 tablet by mouth 2 times daily for 7 days, Disp: 14 tablet, Rfl: 0    methylPREDNISolone (MEDROL DOSEPACK) 4 MG tablet, Take by mouth., Disp: 1 kit, Rfl: 0    diclofenac (CATAFLAM) 50 MG tablet, TAKE 1 TABLET BY MOUTH THREE TIMES DAILY, Disp: 90 tablet, Rfl: 0    losartan (COZAAR) 50 MG tablet, Take 2 tablets by mouth once daily, Disp: 180 tablet, Rfl: 0    amLODIPine (NORVASC) 10 MG tablet, Take 1 tablet by mouth once daily, Disp: 90 tablet, Rfl: 0    metoprolol tartrate (LOPRESSOR) 50 MG tablet, Take 1 tablet by mouth twice daily, Disp: 180 tablet, Rfl: 1    busPIRone (BUSPAR) 5 MG tablet, Take 1 tablet by mouth twice daily, Disp: 180 tablet, Rfl: 1    buPROPion (WELLBUTRIN XL) 150 MG extended release tablet, TAKE 1 TABLET BY MOUTH ONCE DAILY IN THE MORNING (Patient not taking: Reported on 12/14/2022), Disp: 90 tablet, Rfl: 0    NONFORMULARY, Indications: CBD oil  (Patient not taking: Reported on 12/14/2022), Disp: , Rfl:     diclofenac sodium (VOLTAREN) 1 % GEL, Apply 4 g topically 2 times daily (Patient not taking: Reported on 12/14/2022), Disp: 200 g, Rfl: 2    aspirin 81 MG tablet, Take 81 mg by mouth daily (Patient not taking: Reported on 12/14/2022), Disp: , Rfl:     Potassium 99 MG TABS, Take 99 mg by mouth  (Patient not taking: Reported on 12/14/2022), Disp: , Rfl:    Patient Active Problem List   Diagnosis    Essential hypertension    Myalgia    Fatigue    Primary osteoarthritis of both knees    Primary osteoarthritis of both hips    Polyarthritis    BELEN positive    Raised antibody titer    Chronic left shoulder pain    Current moderate episode of major depressive disorder without prior episode (HCC)    Heel spur, left    Class 2 severe obesity due to excess calories with serious comorbidity and body mass index (BMI) of 39.0 to 39.9 in adult [facility])    Hallux rigidus of left foot    Bunion     Past Medical History:   Diagnosis Date    Arthritis     osteoarthritis    Essential hypertension 5/22/2017     Past Surgical History:   Procedure Laterality Date    ARTHRODESIS Left 4/22/2022    ARTHRODESIS FIRST DIGIT LEFT FOOT performed by Kenrick Villanueva DPM at [place] [address]  2000    then reversed in 2003 Social History     Socioeconomic History    Marital status:      Spouse name: Not on file    Number of children: Not on file    Years of education: Not on file    Highest education level: Not on file   Occupational History     Employer: [employer]   Tobacco Use    Smoking status: Former     Packs/day: 0.50     Years: 35.00     Pack years: 17.50     Types: Cigarettes     Quit date: 2009     Years since quittin.5    Smokeless tobacco: Never   Vaping Use    Vaping Use: Never used   Substance and Sexual Activity    Alcohol use: Yes     Comment: socially    Drug use: Never    Sexual activity: Not Currently     Partners: Male   Other Topics Concern    Not on file   Social History Narrative    Not on file     Social Determinants of Health     Financial Resource Strain: Not on file   Food Insecurity: Not on file   Transportation Needs: Not on file   Physical Activity: Not on file   Stress: Not on file   Social Connections: Not on file   Intimate Partner Violence: Not on file   Housing Stability: Not on file     History reviewed. No pertinent family history. There are no preventive care reminders to display for this patient. There are no preventive care reminders to display for this patient. There are no preventive care reminders to display for this patient.    Health Maintenance Due   Topic    DTaP/Tdap/Td vaccine (1 - Tdap)      Health Maintenance   Topic Date Due    HIV screen  Never done    DTaP/Tdap/Td vaccine (1 - Tdap) Never done    Cervical cancer screen  Never done    Colorectal Cancer Screen  Never done    Breast cancer screen  Never done    COVID-19 Vaccine (4 - Booster for Moderna series) 2022    Flu vaccine (1) Never done    Depression Monitoring  2023    Lipids  2027    Shingles vaccine  Completed    Hepatitis C screen  Completed    Hepatitis A vaccine  Aged Out    Hib vaccine  Aged Out    Meningococcal (ACWY) vaccine  Aged Out    Pneumococcal 0-64 years Vaccine Aged Out      There are no preventive care reminders to display for this patient. There are no preventive care reminders to display for this patient. /80   Pulse 76   Temp 97.9 °F (36.6 °C)   Wt 218 lb (98.9 kg)   SpO2 97%   BMI 37.42 kg/m²     Objective   Physical Exam  Vitals reviewed. Constitutional:       Appearance: She is well-developed. She is obese. She is ill-appearing. HENT:      Head: Normocephalic and atraumatic. Right Ear: Hearing normal. A middle ear effusion is present. Tympanic membrane is bulging. Left Ear: Hearing normal. A middle ear effusion is present. Tympanic membrane is bulging. Nose: Nose normal.      Mouth/Throat:      Dentition: Normal dentition. Pharynx: Posterior oropharyngeal erythema present. No oropharyngeal exudate. Tonsils: No tonsillar exudate. Eyes:      Conjunctiva/sclera: Conjunctivae normal.      Pupils: Pupils are equal, round, and reactive to light. Cardiovascular:      Rate and Rhythm: Normal rate and regular rhythm. Heart sounds: Normal heart sounds. No murmur heard. Pulmonary:      Effort: Pulmonary effort is normal. No respiratory distress. Breath sounds: Normal breath sounds. No wheezing. Abdominal:      General: Bowel sounds are normal. There is no distension. Palpations: Abdomen is soft. Musculoskeletal:      Cervical back: Normal range of motion and neck supple. Lymphadenopathy:      Cervical: No cervical adenopathy. Skin:     General: Skin is warm and dry. Findings: No rash. Neurological:      Mental Status: She is alert. Psychiatric:         Behavior: Behavior normal.                An electronic signature was used to authenticate this note.     --Imani Winters DO

## 2022-12-20 ENCOUNTER — OFFICE VISIT (OUTPATIENT)
Dept: FAMILY MEDICINE CLINIC | Age: 57
End: 2022-12-20

## 2022-12-20 VITALS
WEIGHT: 218 LBS | TEMPERATURE: 98.4 F | DIASTOLIC BLOOD PRESSURE: 84 MMHG | RESPIRATION RATE: 20 BRPM | OXYGEN SATURATION: 98 % | BODY MASS INDEX: 37.22 KG/M2 | HEIGHT: 64 IN | SYSTOLIC BLOOD PRESSURE: 126 MMHG | HEART RATE: 74 BPM

## 2022-12-20 DIAGNOSIS — J06.9 UPPER RESPIRATORY TRACT INFECTION, UNSPECIFIED TYPE: Primary | ICD-10-CM

## 2022-12-20 RX ORDER — BENZONATATE 100 MG/1
100-200 CAPSULE ORAL 3 TIMES DAILY PRN
Qty: 42 CAPSULE | Refills: 0 | Status: SHIPPED | OUTPATIENT
Start: 2022-12-20 | End: 2022-12-27

## 2022-12-20 RX ORDER — METHYLPREDNISOLONE ACETATE 40 MG/ML
40 INJECTION, SUSPENSION INTRA-ARTICULAR; INTRALESIONAL; INTRAMUSCULAR; SOFT TISSUE ONCE
Status: COMPLETED | OUTPATIENT
Start: 2022-12-20 | End: 2022-12-20

## 2022-12-20 RX ADMIN — METHYLPREDNISOLONE ACETATE 40 MG: 40 INJECTION, SUSPENSION INTRA-ARTICULAR; INTRALESIONAL; INTRAMUSCULAR; SOFT TISSUE at 10:36

## 2022-12-20 ASSESSMENT — ENCOUNTER SYMPTOMS
VOMITING: 0
DIARRHEA: 0
COUGH: 1
PHOTOPHOBIA: 0
SORE THROAT: 1
BACK PAIN: 0
NAUSEA: 0
SHORTNESS OF BREATH: 0
ABDOMINAL PAIN: 0

## 2022-12-20 NOTE — PROGRESS NOTES
22  Joe No : 1965 Sex: female  Age 59 y.o. Subjective:  Chief Complaint   Patient presents with    Cough         75-year-old female with a history of hypertension presents to the walk-in clinic for evaluation of sore throat. She states that she went to the [facility] office a couple weeks ago for cold symptoms. She has now finished a Medrol Dosepak. She has 1 dose of amoxicillin left. She is still having cough and sore throat. She states today she called off work. She denies any fever, chills, nausea or vomiting. She has not taking any over-the-counter medication. Review of Systems   Constitutional:  Negative for chills and fever. HENT:  Positive for sore throat. Negative for congestion and ear pain. Eyes:  Negative for photophobia and visual disturbance. Respiratory:  Positive for cough. Negative for shortness of breath. Cardiovascular:  Negative for chest pain. Gastrointestinal:  Negative for abdominal pain, diarrhea, nausea and vomiting. Genitourinary:  Negative for difficulty urinating, dysuria, frequency and urgency. Musculoskeletal:  Negative for back pain, neck pain and neck stiffness. Skin:  Negative for rash. Neurological:  Negative for dizziness, syncope, weakness, light-headedness and headaches. Hematological:  Negative for adenopathy. Does not bruise/bleed easily. Psychiatric/Behavioral:  Negative for agitation and confusion. All other systems reviewed and are negative. PMH:     Past Medical History:   Diagnosis Date    Arthritis     osteoarthritis    Essential hypertension 2017       Past Surgical History:   Procedure Laterality Date    ARTHRODESIS Left 2022    ARTHRODESIS FIRST DIGIT LEFT FOOT performed by Kenrick Villanueva DPM at [place] [address]      then reversed in        History reviewed. No pertinent family history.     Medications:     Current Outpatient Medications: benzonatate (TESSALON) 100 MG capsule, Take 1-2 capsules by mouth 3 times daily as needed for Cough, Disp: 42 capsule, Rfl: 0    amoxicillin (AMOXIL) 875 MG tablet, Take 1 tablet by mouth 2 times daily for 7 days, Disp: 14 tablet, Rfl: 0    methylPREDNISolone (MEDROL DOSEPACK) 4 MG tablet, Take by mouth., Disp: 1 kit, Rfl: 0    diclofenac (CATAFLAM) 50 MG tablet, TAKE 1 TABLET BY MOUTH THREE TIMES DAILY, Disp: 90 tablet, Rfl: 0    buPROPion (WELLBUTRIN XL) 150 MG extended release tablet, TAKE 1 TABLET BY MOUTH ONCE DAILY IN THE MORNING, Disp: 90 tablet, Rfl: 0    losartan (COZAAR) 50 MG tablet, Take 2 tablets by mouth once daily, Disp: 180 tablet, Rfl: 0    amLODIPine (NORVASC) 10 MG tablet, Take 1 tablet by mouth once daily, Disp: 90 tablet, Rfl: 0    metoprolol tartrate (LOPRESSOR) 50 MG tablet, Take 1 tablet by mouth twice daily, Disp: 180 tablet, Rfl: 1    busPIRone (BUSPAR) 5 MG tablet, Take 1 tablet by mouth twice daily, Disp: 180 tablet, Rfl: 1    NONFORMULARY, Indications: CBD oil, Disp: , Rfl:     diclofenac sodium (VOLTAREN) 1 % GEL, Apply 4 g topically 2 times daily, Disp: 200 g, Rfl: 2    aspirin 81 MG tablet, Take 81 mg by mouth daily, Disp: , Rfl:     Potassium 99 MG TABS, Take 99 mg by mouth, Disp: , Rfl:     Allergies:   No Known Allergies    Social History:     Social History     Tobacco Use    Smoking status: Former     Packs/day: 0.50     Years: 35.00     Pack years: 17.50     Types: Cigarettes     Quit date: 2009     Years since quittin.5    Smokeless tobacco: Never   Vaping Use    Vaping Use: Never used   Substance Use Topics    Alcohol use: Yes     Comment: socially    Drug use: Never       Patient lives at home. Physical Exam:     Vitals:    22 1025   BP: 126/84   Pulse: 74   Resp: 20   Temp: 98.4 °F (36.9 °C)   TempSrc: Temporal   SpO2: 98%   Weight: 218 lb (98.9 kg)   Height: 5' 4\" (1.626 m)       Exam:  Physical Exam  Vitals and nursing note reviewed.    Constitutional: General: She is not in acute distress. Appearance: She is well-developed. HENT:      Head: Normocephalic and atraumatic. Right Ear: Tympanic membrane normal.      Left Ear: Tympanic membrane normal.      Nose: Nose normal.      Mouth/Throat:      Mouth: Mucous membranes are moist.      Pharynx: Oropharynx is clear. Eyes:      Conjunctiva/sclera: Conjunctivae normal.      Pupils: Pupils are equal, round, and reactive to light. Cardiovascular:      Rate and Rhythm: Normal rate and regular rhythm. Pulmonary:      Effort: Pulmonary effort is normal. No respiratory distress. Breath sounds: Normal breath sounds. Abdominal:      General: Bowel sounds are normal.      Palpations: Abdomen is soft. Tenderness: There is no abdominal tenderness. Musculoskeletal:         General: Normal range of motion. Cervical back: Normal range of motion. No rigidity. Lymphadenopathy:      Cervical: No cervical adenopathy. Skin:     General: Skin is warm and dry. Neurological:      General: No focal deficit present. Mental Status: She is alert and oriented to person, place, and time. Psychiatric:         Mood and Affect: Mood normal.         Behavior: Behavior normal.         Thought Content: Thought content normal.         Judgment: Judgment normal.         Testing:           Medical Decision Making:     Patient upon arrival did not appear toxic or lethargic. Vital signs were reviewed. Past medical history reviewed. Allergies reviewed. Medications reviewed. Patient is presenting with the above complaint of cough, congestion and sore throat. Differential diagnosis was discussed with the patient. I doubt she has anything bacterial going on at this time after completing a prescription for amoxicillin. Patient will be given a prescription for Tessalon Perles. She will be given 40 mg IM Depo-Medrol.   Patient may use over-the-counter analgesics and decongestants as needed. Patient is continue to monitor symptoms and follow-up with their primary care provider in 3-5 days if no improvement. Patient will return or go to the emergency department for any worsening symptoms. Patient understands the plan is agreeable. Clinical Impression:   Chhaya Lyman was seen today for cough. Diagnoses and all orders for this visit:    Upper respiratory tract infection, unspecified type    Other orders  -     methylPREDNISolone acetate (DEPO-MEDROL) injection 40 mg  -     benzonatate (TESSALON) 100 MG capsule; Take 1-2 capsules by mouth 3 times daily as needed for Cough      The patient is to call for any concerns or return if any of the signs or symptoms worsen. The patient is to follow-up with PCP in the next 2-3 days for repeat evaluation repeat assessment or go directly to the emergency department. SIGNATURE: Ashley Locke PA-C

## 2022-12-20 NOTE — LETTER
[facility]  [address] [place] [address]  Phone: [phone]  Fax: [phone]    Charles Metter, [place]        December 20, 2022     Patient: Joe Willard   YOB: 1965   Date of Visit: 12/20/2022       To Whom it May Concern:    Urban Tinoco was seen in my clinic on 12/20/2022. She may return to work on 12/22/2022. If you have any questions or concerns, please don't hesitate to call.     Sincerely,         TREMAYNE MONSALVE

## 2022-12-27 ENCOUNTER — OFFICE VISIT (OUTPATIENT)
Dept: FAMILY MEDICINE CLINIC | Age: 57
End: 2022-12-27
Payer: COMMERCIAL

## 2022-12-27 VITALS
SYSTOLIC BLOOD PRESSURE: 122 MMHG | OXYGEN SATURATION: 96 % | DIASTOLIC BLOOD PRESSURE: 66 MMHG | TEMPERATURE: 97.6 F | RESPIRATION RATE: 20 BRPM | HEART RATE: 60 BPM | WEIGHT: 215 LBS | BODY MASS INDEX: 36.7 KG/M2 | HEIGHT: 64 IN

## 2022-12-27 DIAGNOSIS — B96.89 ACUTE BACTERIAL SINUSITIS: Primary | ICD-10-CM

## 2022-12-27 DIAGNOSIS — J40 BRONCHITIS: ICD-10-CM

## 2022-12-27 DIAGNOSIS — J01.90 ACUTE BACTERIAL SINUSITIS: Primary | ICD-10-CM

## 2022-12-27 PROCEDURE — 99213 OFFICE O/P EST LOW 20 MIN: CPT | Performed by: FAMILY MEDICINE

## 2022-12-27 PROCEDURE — 3074F SYST BP LT 130 MM HG: CPT | Performed by: FAMILY MEDICINE

## 2022-12-27 PROCEDURE — 3078F DIAST BP <80 MM HG: CPT | Performed by: FAMILY MEDICINE

## 2022-12-27 RX ORDER — GUAIFENESIN AND CODEINE PHOSPHATE 100; 10 MG/5ML; MG/5ML
5 SOLUTION ORAL 4 TIMES DAILY PRN
Qty: 120 ML | Refills: 0 | Status: SHIPPED | OUTPATIENT
Start: 2022-12-27 | End: 2023-01-03

## 2022-12-27 RX ORDER — PREDNISONE 10 MG/1
TABLET ORAL
Qty: 18 TABLET | Refills: 0 | Status: SHIPPED | OUTPATIENT
Start: 2022-12-27

## 2022-12-27 RX ORDER — LEVOFLOXACIN 500 MG/1
500 TABLET, FILM COATED ORAL DAILY
Qty: 10 TABLET | Refills: 0 | Status: SHIPPED | OUTPATIENT
Start: 2022-12-27 | End: 2023-01-06

## 2022-12-27 RX ORDER — ALBUTEROL SULFATE 90 UG/1
2 AEROSOL, METERED RESPIRATORY (INHALATION) 4 TIMES DAILY PRN
Qty: 18 G | Refills: 0 | Status: SHIPPED | OUTPATIENT
Start: 2022-12-27

## 2022-12-27 SDOH — ECONOMIC STABILITY: FOOD INSECURITY: WITHIN THE PAST 12 MONTHS, THE FOOD YOU BOUGHT JUST DIDN'T LAST AND YOU DIDN'T HAVE MONEY TO GET MORE.: NEVER TRUE

## 2022-12-27 SDOH — ECONOMIC STABILITY: FOOD INSECURITY: WITHIN THE PAST 12 MONTHS, YOU WORRIED THAT YOUR FOOD WOULD RUN OUT BEFORE YOU GOT MONEY TO BUY MORE.: NEVER TRUE

## 2022-12-27 ASSESSMENT — ENCOUNTER SYMPTOMS
RHINORRHEA: 1
CONSTIPATION: 0
BLOOD IN STOOL: 0
SORE THROAT: 0
SHORTNESS OF BREATH: 0
SINUS PRESSURE: 0
COLOR CHANGE: 0
APNEA: 0
EYE REDNESS: 0
CHEST TIGHTNESS: 0
EYE PAIN: 0
DIARRHEA: 0
NAUSEA: 0
COUGH: 1
ABDOMINAL PAIN: 0
EYE ITCHING: 0
BACK PAIN: 0
WHEEZING: 1
VOMITING: 0

## 2022-12-27 ASSESSMENT — SOCIAL DETERMINANTS OF HEALTH (SDOH): HOW HARD IS IT FOR YOU TO PAY FOR THE VERY BASICS LIKE FOOD, HOUSING, MEDICAL CARE, AND HEATING?: NOT VERY HARD

## 2022-12-27 NOTE — PROGRESS NOTES
Chief Complaint:     Chief Complaint   Patient presents with    Cough     Started over a week ago with congestion. Went to [facility] in 12/20. Pt stated the Tessalon pearls did not help    Conjunctivitis     Left eye started getting red on 12/25 and her right eye started getting red last night. Both are watery and a little itchy    Otalgia     Pain in both ears started yesterday, worse in right ear and jaw pain         Cough  This is a new problem. The current episode started 1 to 4 weeks ago. The problem has been unchanged. The problem occurs constantly. The cough is Non-productive. Associated symptoms include ear pain, myalgias, nasal congestion, postnasal drip, rhinorrhea and wheezing. Pertinent negatives include no chest pain, eye redness, fever, headaches, rash, sore throat or shortness of breath. Nothing aggravates the symptoms. She has tried OTC cough suppressant and prescription cough suppressant for the symptoms. The treatment provided no relief. There is no history of environmental allergies.      Patient Active Problem List   Diagnosis    Essential hypertension    Myalgia    Fatigue    Primary osteoarthritis of both knees    Primary osteoarthritis of both hips    Polyarthritis    BELEN positive    Raised antibody titer    Chronic left shoulder pain    Current moderate episode of major depressive disorder without prior episode (HCC)    Heel spur, left    Class 2 severe obesity due to excess calories with serious comorbidity and body mass index (BMI) of 39.0 to 39.9 in adult [facility])    Hallux rigidus of left foot    Bunion       Past Medical History:   Diagnosis Date    Arthritis     osteoarthritis    Essential hypertension 5/22/2017       Past Surgical History:   Procedure Laterality Date    ARTHRODESIS Left 4/22/2022    ARTHRODESIS FIRST DIGIT LEFT FOOT performed by No Martinez DPM at [place] [id number]  2000    then reversed in 2003       Current Outpatient Medications Medication Sig Dispense Refill    levoFLOXacin (LEVAQUIN) 500 MG tablet Take 1 tablet by mouth daily for 10 days 10 tablet 0    predniSONE (DELTASONE) 10 MG tablet 30mg daily x3 days, then 20mg daily x3 days, then 10mg daily x3 days 18 tablet 0    guaiFENesin-codeine (TUSSI-ORGANIDIN NR) 100-10 MG/5ML syrup Take 5 mLs by mouth 4 times daily as needed for Cough for up to 7 days. Max Daily Amount: 20 mLs 120 mL 0    albuterol sulfate HFA (VENTOLIN HFA) 108 (90 Base) MCG/ACT inhaler Inhale 2 puffs into the lungs 4 times daily as needed for Wheezing 18 g 0    diclofenac (CATAFLAM) 50 MG tablet TAKE 1 TABLET BY MOUTH THREE TIMES DAILY (Patient taking differently: 2 times daily) 90 tablet 0    buPROPion (WELLBUTRIN XL) 150 MG extended release tablet TAKE 1 TABLET BY MOUTH ONCE DAILY IN THE MORNING 90 tablet 0    losartan (COZAAR) 50 MG tablet Take 2 tablets by mouth once daily 180 tablet 0    amLODIPine (NORVASC) 10 MG tablet Take 1 tablet by mouth once daily 90 tablet 0    metoprolol tartrate (LOPRESSOR) 50 MG tablet Take 1 tablet by mouth twice daily 180 tablet 1    busPIRone (BUSPAR) 5 MG tablet Take 1 tablet by mouth twice daily 180 tablet 1    benzonatate (TESSALON) 100 MG capsule Take 1-2 capsules by mouth 3 times daily as needed for Cough (Patient not taking: Reported on 12/27/2022) 42 capsule 0    NONFORMULARY Indications: CBD oil (Patient not taking: Reported on 12/27/2022)      diclofenac sodium (VOLTAREN) 1 % GEL Apply 4 g topically 2 times daily (Patient not taking: Reported on 12/27/2022) 200 g 2    aspirin 81 MG tablet Take 81 mg by mouth daily (Patient not taking: Reported on 12/27/2022)      Potassium 99 MG TABS Take 99 mg by mouth (Patient not taking: Reported on 12/27/2022)       No current facility-administered medications for this visit.        No Known Allergies    Social History     Socioeconomic History    Marital status:      Spouse name: None    Number of children: None    Years of education: None    Highest education level: None   Occupational History     Employer: [employer]   Tobacco Use    Smoking status: Former     Packs/day: 0.50     Years: 35.00     Pack years: 17.50     Types: Cigarettes     Quit date: 2009     Years since quittin.6    Smokeless tobacco: Never   Vaping Use    Vaping Use: Never used   Substance and Sexual Activity    Alcohol use: Yes     Comment: socially    Drug use: Never    Sexual activity: Not Currently     Partners: Male     Social Determinants of Health     Financial Resource Strain: Low Risk     Difficulty of Paying Living Expenses: Not very hard   Food Insecurity: No Food Insecurity    Worried About Running Out of Food in the Last Year: Never true    Ran Out of Food in the Last Year: Never true       History reviewed. No pertinent family history. Review of Systems   Constitutional:  Negative for activity change, appetite change, fatigue and fever. HENT:  Positive for ear pain, postnasal drip and rhinorrhea. Negative for congestion, hearing loss, nosebleeds, sinus pressure and sore throat. Eyes:  Negative for pain, redness, itching and visual disturbance. Respiratory:  Positive for cough and wheezing. Negative for apnea, chest tightness and shortness of breath. Cardiovascular:  Negative for chest pain, palpitations and leg swelling. Gastrointestinal:  Negative for abdominal pain, blood in stool, constipation, diarrhea, nausea and vomiting. Endocrine: Negative. Genitourinary:  Negative for decreased urine volume, difficulty urinating, dysuria, frequency, hematuria and urgency. Musculoskeletal:  Positive for myalgias. Negative for arthralgias, back pain, gait problem and neck pain. Skin:  Negative for color change and rash. Allergic/Immunologic: Negative for environmental allergies and food allergies. Neurological:  Negative for dizziness, weakness, light-headedness, numbness and headaches.    Hematological:  Negative for adenopathy. Does not bruise/bleed easily. Psychiatric/Behavioral:  Negative for behavioral problems, dysphoric mood and sleep disturbance. The patient is not nervous/anxious and is not hyperactive. All other systems reviewed and are negative. /66   Pulse 60   Temp 97.6 °F (36.4 °C) (Temporal)   Resp 20   Ht 5' 4\" (1.626 m)   Wt 215 lb (97.5 kg)   SpO2 96%   BMI 36.90 kg/m²     Physical Exam  Vitals and nursing note reviewed. Constitutional:       General: She is not in acute distress. Appearance: Normal appearance. She is well-developed. HENT:      Head: Normocephalic and atraumatic. Right Ear: Hearing, tympanic membrane and external ear normal. No tenderness. No middle ear effusion. Left Ear: Hearing, tympanic membrane and external ear normal. No tenderness. No middle ear effusion. Nose: Congestion and rhinorrhea present. Right Turbinates: Not enlarged. Left Turbinates: Not enlarged. Mouth/Throat:      Mouth: Mucous membranes are moist.      Tongue: No lesions. Pharynx: Oropharynx is clear. No oropharyngeal exudate or posterior oropharyngeal erythema. Eyes:      General: No scleral icterus. Conjunctiva/sclera: Conjunctivae normal.      Pupils: Pupils are equal, round, and reactive to light. Neck:      Thyroid: No thyromegaly. Cardiovascular:      Rate and Rhythm: Normal rate and regular rhythm. Heart sounds: Normal heart sounds. No murmur heard. Pulmonary:      Effort: Pulmonary effort is normal. No respiratory distress. Breath sounds: Rhonchi present. No wheezing or rales. Abdominal:      General: Bowel sounds are normal. There is no distension. Palpations: Abdomen is soft. Tenderness: There is no abdominal tenderness. Musculoskeletal:         General: No tenderness. Normal range of motion. Cervical back: Normal range of motion and neck supple. No rigidity. No muscular tenderness.    Lymphadenopathy: Cervical: No cervical adenopathy. Skin:     General: Skin is warm and dry. Findings: No erythema or rash. Neurological:      General: No focal deficit present. Mental Status: She is alert and oriented to person, place, and time. Cranial Nerves: No cranial nerve deficit. Deep Tendon Reflexes: Reflexes are normal and symmetric. Reflexes normal.   Psychiatric:         Mood and Affect: Mood normal.                               ASSESSMENT/PLAN:    Patient Active Problem List   Diagnosis    Essential hypertension    Myalgia    Fatigue    Primary osteoarthritis of both knees    Primary osteoarthritis of both hips    Polyarthritis    BELEN positive    Raised antibody titer    Chronic left shoulder pain    Current moderate episode of major depressive disorder without prior episode (HCC)    Heel spur, left    Class 2 severe obesity due to excess calories with serious comorbidity and body mass index (BMI) of 39.0 to 39.9 in [facility])    Hallux rigidus of left foot    Estephania Torres was seen today for cough, conjunctivitis and otalgia. Diagnoses and all orders for this visit:    Acute bacterial sinusitis  -     guaiFENesin-codeine (TUSSI-ORGANIDIN NR) 100-10 MG/5ML syrup; Take 5 mLs by mouth 4 times daily as needed for Cough for up to 7 days. Max Daily Amount: 20 mLs    Bronchitis  -     guaiFENesin-codeine (TUSSI-ORGANIDIN NR) 100-10 MG/5ML syrup; Take 5 mLs by mouth 4 times daily as needed for Cough for up to 7 days. Max Daily Amount: 20 mLs  -     XR CHEST (2 VW); Future    Other orders  -     levoFLOXacin (LEVAQUIN) 500 MG tablet; Take 1 tablet by mouth daily for 10 days  -     predniSONE (DELTASONE) 10 MG tablet; 30mg daily x3 days, then 20mg daily x3 days, then 10mg daily x3 days  -     albuterol sulfate HFA (VENTOLIN HFA) 108 (90 Base) MCG/ACT inhaler; Inhale 2 puffs into the lungs 4 times daily as needed for Wheezing        Return if symptoms worsen or fail to improve.       I spent 20 minutes with this patient. I spent greater than 50% of the time counseling this patient.         Kali Salazar DO  12/27/2022  11:57 AM

## 2022-12-29 RX ORDER — BUPROPION HYDROCHLORIDE 150 MG/1
TABLET ORAL
Qty: 90 TABLET | Refills: 0 | Status: SHIPPED | OUTPATIENT
Start: 2022-12-29

## 2022-12-29 RX ORDER — DICLOFENAC POTASSIUM 50 MG/1
TABLET, FILM COATED ORAL
Qty: 90 TABLET | Refills: 0 | Status: SHIPPED | OUTPATIENT
Start: 2022-12-29

## 2022-12-29 RX ORDER — LOSARTAN POTASSIUM 50 MG/1
TABLET ORAL
Qty: 180 TABLET | Refills: 0 | Status: SHIPPED | OUTPATIENT
Start: 2022-12-29

## 2022-12-29 RX ORDER — AMLODIPINE BESYLATE 10 MG/1
TABLET ORAL
Qty: 90 TABLET | Refills: 0 | Status: SHIPPED | OUTPATIENT
Start: 2022-12-29

## 2022-12-30 ENCOUNTER — COMMUNITY OUTREACH (OUTPATIENT)
Dept: PRIMARY CARE CLINIC | Age: 57
End: 2022-12-30

## 2023-02-02 RX ORDER — DICLOFENAC POTASSIUM 50 MG/1
TABLET, FILM COATED ORAL
Qty: 90 TABLET | Refills: 0 | Status: SHIPPED | OUTPATIENT
Start: 2023-02-02

## 2023-03-01 RX ORDER — DICLOFENAC POTASSIUM 50 MG/1
TABLET, FILM COATED ORAL
Qty: 90 TABLET | Refills: 0 | Status: SHIPPED | OUTPATIENT
Start: 2023-03-01

## 2023-03-30 ENCOUNTER — OFFICE VISIT (OUTPATIENT)
Dept: PRIMARY CARE CLINIC | Age: 58
End: 2023-03-30

## 2023-03-30 VITALS
WEIGHT: 226 LBS | HEIGHT: 64 IN | SYSTOLIC BLOOD PRESSURE: 128 MMHG | OXYGEN SATURATION: 100 % | HEART RATE: 56 BPM | RESPIRATION RATE: 18 BRPM | BODY MASS INDEX: 38.58 KG/M2 | TEMPERATURE: 97.6 F | DIASTOLIC BLOOD PRESSURE: 82 MMHG

## 2023-03-30 DIAGNOSIS — M54.31 BILATERAL SCIATICA: Primary | ICD-10-CM

## 2023-03-30 DIAGNOSIS — M54.32 BILATERAL SCIATICA: Primary | ICD-10-CM

## 2023-03-30 RX ORDER — PREDNISONE 10 MG/1
TABLET ORAL
Qty: 18 TABLET | Refills: 0 | Status: SHIPPED | OUTPATIENT
Start: 2023-03-30

## 2023-03-30 RX ORDER — DEXAMETHASONE SODIUM PHOSPHATE 10 MG/ML
10 INJECTION INTRAMUSCULAR; INTRAVENOUS ONCE
Status: COMPLETED | OUTPATIENT
Start: 2023-03-30 | End: 2023-03-30

## 2023-03-30 RX ORDER — PREDNISONE 10 MG/1
TABLET ORAL
Qty: 18 TABLET | Refills: 0 | Status: SHIPPED
Start: 2023-03-30 | End: 2023-03-30

## 2023-03-30 RX ORDER — CYCLOBENZAPRINE HCL 10 MG
10 TABLET ORAL NIGHTLY PRN
Qty: 10 TABLET | Refills: 0 | Status: SHIPPED | OUTPATIENT
Start: 2023-03-30 | End: 2023-04-09

## 2023-03-30 RX ORDER — CYCLOBENZAPRINE HCL 10 MG
10 TABLET ORAL NIGHTLY PRN
Qty: 10 TABLET | Refills: 0 | Status: SHIPPED
Start: 2023-03-30 | End: 2023-03-30

## 2023-03-30 RX ADMIN — DEXAMETHASONE SODIUM PHOSPHATE 10 MG: 10 INJECTION INTRAMUSCULAR; INTRAVENOUS at 09:38

## 2023-03-30 SDOH — ECONOMIC STABILITY: HOUSING INSECURITY
IN THE LAST 12 MONTHS, WAS THERE A TIME WHEN YOU DID NOT HAVE A STEADY PLACE TO SLEEP OR SLEPT IN A SHELTER (INCLUDING NOW)?: NO

## 2023-03-30 SDOH — ECONOMIC STABILITY: INCOME INSECURITY: HOW HARD IS IT FOR YOU TO PAY FOR THE VERY BASICS LIKE FOOD, HOUSING, MEDICAL CARE, AND HEATING?: NOT HARD AT ALL

## 2023-03-30 SDOH — ECONOMIC STABILITY: FOOD INSECURITY: WITHIN THE PAST 12 MONTHS, THE FOOD YOU BOUGHT JUST DIDN'T LAST AND YOU DIDN'T HAVE MONEY TO GET MORE.: NEVER TRUE

## 2023-03-30 SDOH — ECONOMIC STABILITY: FOOD INSECURITY: WITHIN THE PAST 12 MONTHS, YOU WORRIED THAT YOUR FOOD WOULD RUN OUT BEFORE YOU GOT MONEY TO BUY MORE.: NEVER TRUE

## 2023-03-30 ASSESSMENT — PATIENT HEALTH QUESTIONNAIRE - PHQ9
SUM OF ALL RESPONSES TO PHQ QUESTIONS 1-9: 0
6. FEELING BAD ABOUT YOURSELF - OR THAT YOU ARE A FAILURE OR HAVE LET YOURSELF OR YOUR FAMILY DOWN: 0
SUM OF ALL RESPONSES TO PHQ QUESTIONS 1-9: 0
8. MOVING OR SPEAKING SO SLOWLY THAT OTHER PEOPLE COULD HAVE NOTICED. OR THE OPPOSITE, BEING SO FIGETY OR RESTLESS THAT YOU HAVE BEEN MOVING AROUND A LOT MORE THAN USUAL: 0
2. FEELING DOWN, DEPRESSED OR HOPELESS: 0
7. TROUBLE CONCENTRATING ON THINGS, SUCH AS READING THE NEWSPAPER OR WATCHING TELEVISION: 0
9. THOUGHTS THAT YOU WOULD BE BETTER OFF DEAD, OR OF HURTING YOURSELF: 0
3. TROUBLE FALLING OR STAYING ASLEEP: 0
4. FEELING TIRED OR HAVING LITTLE ENERGY: 0
SUM OF ALL RESPONSES TO PHQ QUESTIONS 1-9: 0
5. POOR APPETITE OR OVEREATING: 0
10. IF YOU CHECKED OFF ANY PROBLEMS, HOW DIFFICULT HAVE THESE PROBLEMS MADE IT FOR YOU TO DO YOUR WORK, TAKE CARE OF THINGS AT HOME, OR GET ALONG WITH OTHER PEOPLE: 0
SUM OF ALL RESPONSES TO PHQ QUESTIONS 1-9: 0
SUM OF ALL RESPONSES TO PHQ9 QUESTIONS 1 & 2: 0
1. LITTLE INTEREST OR PLEASURE IN DOING THINGS: 0

## 2023-03-30 ASSESSMENT — ENCOUNTER SYMPTOMS
ABDOMINAL PAIN: 0
COLOR CHANGE: 0
BLOOD IN STOOL: 0
SORE THROAT: 0
WHEEZING: 0
APNEA: 0
SHORTNESS OF BREATH: 0
RHINORRHEA: 0
SINUS PRESSURE: 0
CHEST TIGHTNESS: 0
VOMITING: 0
NAUSEA: 0
BACK PAIN: 1
DIARRHEA: 0
CONSTIPATION: 0
EYE REDNESS: 0
COUGH: 0
EYE ITCHING: 0
EYE PAIN: 0

## 2023-04-17 ENCOUNTER — TELEPHONE (OUTPATIENT)
Dept: PRIMARY CARE CLINIC | Age: 58
End: 2023-04-17

## 2023-04-17 DIAGNOSIS — M54.32 BILATERAL SCIATICA: Primary | ICD-10-CM

## 2023-04-17 DIAGNOSIS — M54.31 BILATERAL SCIATICA: Primary | ICD-10-CM

## 2023-04-24 RX ORDER — DICLOFENAC POTASSIUM 50 MG/1
TABLET, FILM COATED ORAL
Qty: 90 TABLET | Refills: 0 | Status: SHIPPED | OUTPATIENT
Start: 2023-04-24

## 2023-05-03 ENCOUNTER — OFFICE VISIT (OUTPATIENT)
Dept: PHYSICAL MEDICINE AND REHAB | Age: 58
End: 2023-05-03
Payer: COMMERCIAL

## 2023-05-03 ENCOUNTER — TELEPHONE (OUTPATIENT)
Dept: PHYSICAL MEDICINE AND REHAB | Age: 58
End: 2023-05-03

## 2023-05-03 VITALS
HEART RATE: 73 BPM | SYSTOLIC BLOOD PRESSURE: 136 MMHG | BODY MASS INDEX: 39.27 KG/M2 | WEIGHT: 230 LBS | DIASTOLIC BLOOD PRESSURE: 80 MMHG | HEIGHT: 64 IN

## 2023-05-03 DIAGNOSIS — M79.18 MYOFASCIAL PAIN: ICD-10-CM

## 2023-05-03 DIAGNOSIS — M54.50 CHRONIC BILATERAL LOW BACK PAIN WITHOUT SCIATICA: Primary | ICD-10-CM

## 2023-05-03 DIAGNOSIS — E66.9 OBESITY (BMI 30-39.9): ICD-10-CM

## 2023-05-03 DIAGNOSIS — G89.29 CHRONIC BILATERAL LOW BACK PAIN WITHOUT SCIATICA: Primary | ICD-10-CM

## 2023-05-03 PROCEDURE — 99204 OFFICE O/P NEW MOD 45 MIN: CPT | Performed by: PHYSICAL MEDICINE & REHABILITATION

## 2023-05-03 PROCEDURE — 3075F SYST BP GE 130 - 139MM HG: CPT | Performed by: PHYSICAL MEDICINE & REHABILITATION

## 2023-05-03 PROCEDURE — 3079F DIAST BP 80-89 MM HG: CPT | Performed by: PHYSICAL MEDICINE & REHABILITATION

## 2023-05-03 RX ORDER — CYCLOBENZAPRINE HCL 5 MG
5 TABLET ORAL 3 TIMES DAILY PRN
Qty: 90 TABLET | Refills: 1 | Status: SHIPPED | OUTPATIENT
Start: 2023-05-03 | End: 2023-06-02

## 2023-05-03 NOTE — PROGRESS NOTES
Abiodun Day, [address] [facility]  [address] [place] [address]. [facility]  Phone: [phone]  Fax: [phone]    PCP: Daniel Rodriguez DO  Date of visit: 5/3/23    Chief Complaint   Patient presents with    Back Pain     New patient bilateral sciatica       Dear Dr. Linsey Rosado,     Thank you for referring your patient to be seen. As you know,  Arsenio Diamond is a 59 y.o. female with past medical history as below who presents with pain in her hamstrings for 8-10 week(s). There was a sudden onset of pain after no known injury. Now, the pain is constant and occurs daily. The pain is rated Pain Score:   6, is described as achy, and is located in the legs up to her back. The symptoms have been unchanged since onset. The pain is better with  walking . The pain is worse with  sitting, laying . There  is no consistent  associated numbness/tingling. There is no weakness. There is no bowel/bladder changes. The prior workup has included: Xray    The prior treatment has included:  PT: none    Chiropractic: 5-6 times with temporary relief.    Modalities: none  OTC Tylenol: yes  NSAIDS: diclofenac, advil   Prednisone   Decadron with relief  Opioids: none  Membrane stabilizers: none  Muscle relaxers: flexeril   Previous injections: none  Previous surgery at this site: none    No Known Allergies    Current Outpatient Medications   Medication Sig Dispense Refill    cyclobenzaprine (FLEXERIL) 5 MG tablet Take 1 tablet by mouth 3 times daily as needed for Muscle spasms 90 tablet 1    diclofenac (CATAFLAM) 50 MG tablet TAKE 1 TABLET BY MOUTH THREE TIMES DAILY 90 tablet 0    losartan (COZAAR) 50 MG tablet Take 2 tablets by mouth once daily 180 tablet 0    buPROPion (WELLBUTRIN XL) 150 MG extended release tablet TAKE 1 TABLET BY MOUTH ONCE DAILY IN THE MORNING 90 tablet 0    amLODIPine (NORVASC) 10 MG tablet Take 1 tablet by mouth once daily 90 tablet 0    albuterol

## 2023-05-03 NOTE — TELEPHONE ENCOUNTER
Patient called in and said that [address] get her in until the end of May and she cannot wait that long, she would like to know if a new referral could be sent to [facility]. Please advise.

## 2023-05-04 NOTE — TELEPHONE ENCOUNTER
Called and lvm that new referral for PT was faxed to [facility] in [facility].

## 2023-05-18 ENCOUNTER — OFFICE VISIT (OUTPATIENT)
Dept: PRIMARY CARE CLINIC | Age: 58
End: 2023-05-18
Payer: COMMERCIAL

## 2023-05-18 VITALS
DIASTOLIC BLOOD PRESSURE: 80 MMHG | HEART RATE: 85 BPM | WEIGHT: 229 LBS | BODY MASS INDEX: 39.09 KG/M2 | OXYGEN SATURATION: 98 % | RESPIRATION RATE: 16 BRPM | SYSTOLIC BLOOD PRESSURE: 136 MMHG | HEIGHT: 64 IN

## 2023-05-18 DIAGNOSIS — M54.31 BILATERAL SCIATICA: Primary | ICD-10-CM

## 2023-05-18 DIAGNOSIS — M54.32 BILATERAL SCIATICA: Primary | ICD-10-CM

## 2023-05-18 PROCEDURE — 99213 OFFICE O/P EST LOW 20 MIN: CPT | Performed by: FAMILY MEDICINE

## 2023-05-18 PROCEDURE — 96372 THER/PROPH/DIAG INJ SC/IM: CPT | Performed by: FAMILY MEDICINE

## 2023-05-18 PROCEDURE — 3079F DIAST BP 80-89 MM HG: CPT | Performed by: FAMILY MEDICINE

## 2023-05-18 PROCEDURE — 3075F SYST BP GE 130 - 139MM HG: CPT | Performed by: FAMILY MEDICINE

## 2023-05-18 RX ORDER — GABAPENTIN 100 MG/1
100 CAPSULE ORAL NIGHTLY
Qty: 30 CAPSULE | Refills: 0 | Status: SHIPPED | OUTPATIENT
Start: 2023-05-18 | End: 2023-06-17

## 2023-05-18 RX ORDER — DEXAMETHASONE SODIUM PHOSPHATE 10 MG/ML
10 INJECTION INTRAMUSCULAR; INTRAVENOUS ONCE
Status: COMPLETED | OUTPATIENT
Start: 2023-05-18 | End: 2023-05-18

## 2023-05-18 RX ADMIN — DEXAMETHASONE SODIUM PHOSPHATE 10 MG: 10 INJECTION INTRAMUSCULAR; INTRAVENOUS at 12:06

## 2023-05-18 ASSESSMENT — ENCOUNTER SYMPTOMS
EYE REDNESS: 0
VOMITING: 0
BACK PAIN: 1
BLOOD IN STOOL: 0
WHEEZING: 0
COUGH: 0
CONSTIPATION: 0
SHORTNESS OF BREATH: 0
EYE ITCHING: 0
NAUSEA: 0
RHINORRHEA: 0
CHEST TIGHTNESS: 0
COLOR CHANGE: 0
EYE PAIN: 0
APNEA: 0
DIARRHEA: 0
ABDOMINAL PAIN: 0
SORE THROAT: 0
SINUS PRESSURE: 0

## 2023-05-18 NOTE — PROGRESS NOTES
Chief Complaint:     Chief Complaint   Patient presents with    Back Pain     Is worse than it was before, discuss getting a new referral for PT         Back Pain  This is a recurrent problem. The current episode started more than 1 month ago. The problem occurs daily. The problem is unchanged. The pain is present in the lumbar spine and sacro-iliac. The quality of the pain is described as aching and shooting. The pain radiates to the right thigh and left thigh. The pain is severe. The pain is The same all the time. The symptoms are aggravated by bending, position and sitting. Stiffness is present All day. Associated symptoms include leg pain, numbness, paresthesias, tingling and weakness. Pertinent negatives include no abdominal pain, chest pain, dysuria, fever or headaches. She has tried NSAIDs, chiropractic manipulation, home exercises and muscle relaxant for the symptoms. The treatment provided mild relief.      Patient Active Problem List   Diagnosis    Essential hypertension    Myalgia    Fatigue    Primary osteoarthritis of both knees    Primary osteoarthritis of both hips    Polyarthritis    BELEN positive    Raised antibody titer    Chronic left shoulder pain    Current moderate episode of major depressive disorder without prior episode (HCC)    Heel spur, left    Class 2 severe obesity due to excess calories with serious comorbidity and body mass index (BMI) of 39.0 to 39.9 in adult [facility])    Hallux rigidus of left foot    Bunion       Past Medical History:   Diagnosis Date    Arthritis     osteoarthritis    Essential hypertension 5/22/2017       Past Surgical History:   Procedure Laterality Date    ARTHRODESIS Left 4/22/2022    ARTHRODESIS FIRST DIGIT LEFT FOOT performed by Lalita Feng DPM at [place] [address]  2000    then reversed in 2003       Current Outpatient Medications   Medication Sig Dispense Refill    gabapentin (NEURONTIN) 100 MG capsule Take 1 capsule by mouth

## 2023-05-31 RX ORDER — DICLOFENAC POTASSIUM 50 MG/1
TABLET, FILM COATED ORAL
Qty: 90 TABLET | Refills: 0 | Status: SHIPPED | OUTPATIENT
Start: 2023-05-31

## 2023-06-20 RX ORDER — METOPROLOL TARTRATE 50 MG/1
TABLET, FILM COATED ORAL
Qty: 180 TABLET | Refills: 0 | Status: SHIPPED | OUTPATIENT
Start: 2023-06-20

## 2023-06-20 RX ORDER — BUSPIRONE HYDROCHLORIDE 5 MG/1
TABLET ORAL
Qty: 180 TABLET | Refills: 0 | Status: SHIPPED | OUTPATIENT
Start: 2023-06-20

## 2023-06-26 RX ORDER — DICLOFENAC POTASSIUM 50 MG/1
TABLET, FILM COATED ORAL
Qty: 90 TABLET | Refills: 0 | Status: SHIPPED | OUTPATIENT
Start: 2023-06-26

## 2023-06-28 RX ORDER — METOPROLOL TARTRATE 50 MG/1
TABLET, FILM COATED ORAL
Qty: 180 TABLET | Refills: 0 | Status: SHIPPED | OUTPATIENT
Start: 2023-06-28

## 2023-06-28 RX ORDER — BUPROPION HYDROCHLORIDE 150 MG/1
TABLET ORAL
Qty: 90 TABLET | Refills: 0 | Status: SHIPPED | OUTPATIENT
Start: 2023-06-28

## 2023-06-28 RX ORDER — BUSPIRONE HYDROCHLORIDE 5 MG/1
TABLET ORAL
Qty: 180 TABLET | Refills: 0 | Status: SHIPPED | OUTPATIENT
Start: 2023-06-28

## 2023-06-28 RX ORDER — AMLODIPINE BESYLATE 10 MG/1
TABLET ORAL
Qty: 90 TABLET | Refills: 0 | Status: SHIPPED | OUTPATIENT
Start: 2023-06-28

## 2023-06-28 RX ORDER — LOSARTAN POTASSIUM 50 MG/1
TABLET ORAL
Qty: 180 TABLET | Refills: 0 | Status: SHIPPED | OUTPATIENT
Start: 2023-06-28

## 2023-08-03 RX ORDER — DICLOFENAC POTASSIUM 50 MG/1
TABLET, FILM COATED ORAL
Qty: 90 TABLET | Refills: 0 | Status: SHIPPED | OUTPATIENT
Start: 2023-08-03

## 2023-09-05 RX ORDER — DICLOFENAC POTASSIUM 50 MG/1
TABLET, FILM COATED ORAL
Qty: 90 TABLET | Refills: 0 | Status: SHIPPED | OUTPATIENT
Start: 2023-09-05

## 2023-09-27 RX ORDER — BUPROPION HYDROCHLORIDE 150 MG/1
TABLET ORAL
Qty: 90 TABLET | Refills: 0 | Status: SHIPPED | OUTPATIENT
Start: 2023-09-27

## 2023-09-27 RX ORDER — AMLODIPINE BESYLATE 10 MG/1
TABLET ORAL
Qty: 90 TABLET | Refills: 0 | Status: SHIPPED | OUTPATIENT
Start: 2023-09-27

## 2023-09-27 RX ORDER — LOSARTAN POTASSIUM 50 MG/1
TABLET ORAL
Qty: 180 TABLET | Refills: 0 | Status: SHIPPED | OUTPATIENT
Start: 2023-09-27

## 2023-10-05 RX ORDER — DICLOFENAC POTASSIUM 50 MG/1
TABLET, FILM COATED ORAL
Qty: 90 TABLET | Refills: 0 | Status: SHIPPED | OUTPATIENT
Start: 2023-10-05

## 2023-11-03 RX ORDER — DICLOFENAC POTASSIUM 50 MG/1
TABLET, FILM COATED ORAL
Qty: 90 TABLET | Refills: 0 | Status: SHIPPED | OUTPATIENT
Start: 2023-11-03

## 2023-12-07 RX ORDER — DICLOFENAC POTASSIUM 50 MG/1
TABLET, FILM COATED ORAL
Qty: 90 TABLET | Refills: 0 | Status: SHIPPED | OUTPATIENT
Start: 2023-12-07

## 2023-12-21 DIAGNOSIS — I10 ESSENTIAL HYPERTENSION: ICD-10-CM

## 2023-12-21 DIAGNOSIS — R53.83 FATIGUE, UNSPECIFIED TYPE: ICD-10-CM

## 2023-12-21 DIAGNOSIS — M13.0 POLYARTHRITIS: ICD-10-CM

## 2023-12-21 LAB
ALBUMIN SERPL-MCNC: 4.2 G/DL (ref 3.5–5.2)
ALP BLD-CCNC: 66 U/L (ref 35–104)
ALT SERPL-CCNC: 20 U/L (ref 0–32)
ANION GAP SERPL CALCULATED.3IONS-SCNC: 15 MMOL/L (ref 7–16)
AST SERPL-CCNC: 18 U/L (ref 0–31)
BILIRUB SERPL-MCNC: 0.7 MG/DL (ref 0–1.2)
BUN BLDV-MCNC: 15 MG/DL (ref 6–23)
CALCIUM SERPL-MCNC: 9.5 MG/DL (ref 8.6–10.2)
CHLORIDE BLD-SCNC: 102 MMOL/L (ref 98–107)
CHOLESTEROL: 230 MG/DL
CO2: 21 MMOL/L (ref 22–29)
CREAT SERPL-MCNC: 0.7 MG/DL (ref 0.5–1)
GFR SERPL CREATININE-BSD FRML MDRD: >60 ML/MIN/1.73M2
GLUCOSE BLD-MCNC: 105 MG/DL (ref 74–99)
HCT VFR BLD CALC: 41.3 % (ref 34–48)
HDLC SERPL-MCNC: 69 MG/DL
HEMOGLOBIN: 13.5 G/DL (ref 11.5–15.5)
LDL CHOLESTEROL: 142 MG/DL
MCH RBC QN AUTO: 28.1 PG (ref 26–35)
MCHC RBC AUTO-ENTMCNC: 32.7 G/DL (ref 32–34.5)
MCV RBC AUTO: 86 FL (ref 80–99.9)
PDW BLD-RTO: 13.2 % (ref 11.5–15)
PLATELET # BLD: 254 K/UL (ref 130–450)
PMV BLD AUTO: 11 FL (ref 7–12)
POTASSIUM SERPL-SCNC: 4.6 MMOL/L (ref 3.5–5)
RBC # BLD: 4.8 M/UL (ref 3.5–5.5)
RHEUMATOID FACTOR: <10 IU/ML (ref 0–13)
SEDIMENTATION RATE, ERYTHROCYTE: 5 MM/HR (ref 0–20)
SODIUM BLD-SCNC: 138 MMOL/L (ref 132–146)
TOTAL PROTEIN: 6.7 G/DL (ref 6.4–8.3)
TRIGL SERPL-MCNC: 93 MG/DL
TSH SERPL DL<=0.05 MIU/L-ACNC: 1.36 UIU/ML (ref 0.27–4.2)
VLDLC SERPL CALC-MCNC: 19 MG/DL
WBC # BLD: 6.6 K/UL (ref 4.5–11.5)

## 2023-12-22 LAB — ANTI-NUCLEAR ANTIBODY (ANA): NEGATIVE

## 2023-12-26 RX ORDER — LOSARTAN POTASSIUM 50 MG/1
TABLET ORAL
Qty: 180 TABLET | Refills: 0 | Status: SHIPPED | OUTPATIENT
Start: 2023-12-26

## 2023-12-26 RX ORDER — BUPROPION HYDROCHLORIDE 150 MG/1
TABLET ORAL
Qty: 90 TABLET | Refills: 0 | Status: SHIPPED | OUTPATIENT
Start: 2023-12-26

## 2023-12-26 RX ORDER — AMLODIPINE BESYLATE 10 MG/1
TABLET ORAL
Qty: 90 TABLET | Refills: 0 | Status: SHIPPED | OUTPATIENT
Start: 2023-12-26

## 2023-12-29 RX ORDER — METOPROLOL TARTRATE 50 MG/1
TABLET, FILM COATED ORAL
Qty: 180 TABLET | Refills: 0 | Status: SHIPPED | OUTPATIENT
Start: 2023-12-29

## 2024-03-25 RX ORDER — METOPROLOL TARTRATE 50 MG/1
TABLET, FILM COATED ORAL
Qty: 180 TABLET | Refills: 0 | Status: SHIPPED | OUTPATIENT
Start: 2024-03-25

## 2024-03-25 RX ORDER — AMLODIPINE BESYLATE 10 MG/1
TABLET ORAL
Qty: 90 TABLET | Refills: 0 | Status: SHIPPED | OUTPATIENT
Start: 2024-03-25

## 2024-03-25 RX ORDER — BUPROPION HYDROCHLORIDE 150 MG/1
TABLET ORAL
Qty: 90 TABLET | Refills: 0 | Status: SHIPPED | OUTPATIENT
Start: 2024-03-25

## 2024-03-25 RX ORDER — LOSARTAN POTASSIUM 50 MG/1
TABLET ORAL
Qty: 180 TABLET | Refills: 0 | Status: SHIPPED | OUTPATIENT
Start: 2024-03-25

## 2024-03-27 RX ORDER — DICLOFENAC POTASSIUM 50 MG/1
TABLET, FILM COATED ORAL
Qty: 90 TABLET | Refills: 0 | OUTPATIENT
Start: 2024-03-27

## 2024-03-29 ENCOUNTER — OFFICE VISIT (OUTPATIENT)
Dept: FAMILY MEDICINE CLINIC | Age: 59
End: 2024-03-29
Payer: COMMERCIAL

## 2024-03-29 VITALS
BODY MASS INDEX: 39.78 KG/M2 | SYSTOLIC BLOOD PRESSURE: 124 MMHG | HEIGHT: 64 IN | HEART RATE: 93 BPM | RESPIRATION RATE: 18 BRPM | DIASTOLIC BLOOD PRESSURE: 82 MMHG | TEMPERATURE: 97.9 F | WEIGHT: 233 LBS | OXYGEN SATURATION: 97 %

## 2024-03-29 DIAGNOSIS — B34.9 VIRAL ILLNESS: Primary | ICD-10-CM

## 2024-03-29 DIAGNOSIS — R09.81 SINUS CONGESTION: ICD-10-CM

## 2024-03-29 DIAGNOSIS — J02.9 SORE THROAT: ICD-10-CM

## 2024-03-29 LAB — S PYO AG THROAT QL: NORMAL

## 2024-03-29 PROCEDURE — 3074F SYST BP LT 130 MM HG: CPT | Performed by: NURSE PRACTITIONER

## 2024-03-29 PROCEDURE — 99213 OFFICE O/P EST LOW 20 MIN: CPT | Performed by: NURSE PRACTITIONER

## 2024-03-29 PROCEDURE — 3079F DIAST BP 80-89 MM HG: CPT | Performed by: NURSE PRACTITIONER

## 2024-03-29 PROCEDURE — 87880 STREP A ASSAY W/OPTIC: CPT | Performed by: NURSE PRACTITIONER

## 2024-03-29 NOTE — PROGRESS NOTES
Zyrtec / Flonase prn rhinitis, and Cepacol lozenges prn sore throat    - COVID-19 test declined. Encouraged oral fluids and rest. Discussed symptomatic treatments with patient today. The patient is to follow-up with PCP in the next 2-3 days for reevaluation. Red flag symptoms were also discussed with the patient today. If symptoms worsen the patient is to go directly to the emergency department for reevaluation and treatment. Pt verbalizes understanding and is in agreement with plan of care. All questions answered.    SIGNATURE: Wellington Fall, MAGALIE-CNP    *NOTE: This report was transcribed using voice recognition software. Every effort was made to ensure accuracy; however, inadvertent computerized transcription errors may be present.

## 2024-04-11 ENCOUNTER — OFFICE VISIT (OUTPATIENT)
Dept: PRIMARY CARE CLINIC | Age: 59
End: 2024-04-11
Payer: COMMERCIAL

## 2024-04-11 VITALS
HEIGHT: 64 IN | HEART RATE: 74 BPM | BODY MASS INDEX: 39.78 KG/M2 | RESPIRATION RATE: 16 BRPM | OXYGEN SATURATION: 98 % | SYSTOLIC BLOOD PRESSURE: 126 MMHG | WEIGHT: 233 LBS | DIASTOLIC BLOOD PRESSURE: 72 MMHG

## 2024-04-11 DIAGNOSIS — I10 ESSENTIAL HYPERTENSION: Primary | ICD-10-CM

## 2024-04-11 DIAGNOSIS — F32.1 CURRENT MODERATE EPISODE OF MAJOR DEPRESSIVE DISORDER WITHOUT PRIOR EPISODE (HCC): ICD-10-CM

## 2024-04-11 DIAGNOSIS — M54.32 BILATERAL SCIATICA: ICD-10-CM

## 2024-04-11 DIAGNOSIS — M16.0 PRIMARY OSTEOARTHRITIS OF BOTH HIPS: ICD-10-CM

## 2024-04-11 DIAGNOSIS — M79.10 MYALGIA: ICD-10-CM

## 2024-04-11 DIAGNOSIS — R53.83 FATIGUE, UNSPECIFIED TYPE: ICD-10-CM

## 2024-04-11 DIAGNOSIS — M54.31 BILATERAL SCIATICA: ICD-10-CM

## 2024-04-11 DIAGNOSIS — M17.0 PRIMARY OSTEOARTHRITIS OF BOTH KNEES: ICD-10-CM

## 2024-04-11 DIAGNOSIS — G62.9 NEUROPATHY: ICD-10-CM

## 2024-04-11 PROCEDURE — 3078F DIAST BP <80 MM HG: CPT | Performed by: FAMILY MEDICINE

## 2024-04-11 PROCEDURE — 3074F SYST BP LT 130 MM HG: CPT | Performed by: FAMILY MEDICINE

## 2024-04-11 PROCEDURE — 99214 OFFICE O/P EST MOD 30 MIN: CPT | Performed by: FAMILY MEDICINE

## 2024-04-11 RX ORDER — DULOXETIN HYDROCHLORIDE 30 MG/1
30 CAPSULE, DELAYED RELEASE ORAL DAILY
Qty: 90 CAPSULE | Refills: 1 | Status: SHIPPED | OUTPATIENT
Start: 2024-04-11

## 2024-04-11 RX ORDER — PREGABALIN 50 MG/1
50 CAPSULE ORAL 2 TIMES DAILY
Qty: 60 CAPSULE | Refills: 0 | Status: SHIPPED | OUTPATIENT
Start: 2024-04-11 | End: 2024-05-11

## 2024-04-11 ASSESSMENT — PATIENT HEALTH QUESTIONNAIRE - PHQ9
4. FEELING TIRED OR HAVING LITTLE ENERGY: NOT AT ALL
8. MOVING OR SPEAKING SO SLOWLY THAT OTHER PEOPLE COULD HAVE NOTICED. OR THE OPPOSITE, BEING SO FIGETY OR RESTLESS THAT YOU HAVE BEEN MOVING AROUND A LOT MORE THAN USUAL: NOT AT ALL
SUM OF ALL RESPONSES TO PHQ9 QUESTIONS 1 & 2: 0
3. TROUBLE FALLING OR STAYING ASLEEP: NOT AT ALL
2. FEELING DOWN, DEPRESSED OR HOPELESS: NOT AT ALL
9. THOUGHTS THAT YOU WOULD BE BETTER OFF DEAD, OR OF HURTING YOURSELF: NOT AT ALL
SUM OF ALL RESPONSES TO PHQ QUESTIONS 1-9: 0
SUM OF ALL RESPONSES TO PHQ QUESTIONS 1-9: 0
7. TROUBLE CONCENTRATING ON THINGS, SUCH AS READING THE NEWSPAPER OR WATCHING TELEVISION: NOT AT ALL
SUM OF ALL RESPONSES TO PHQ QUESTIONS 1-9: 0
1. LITTLE INTEREST OR PLEASURE IN DOING THINGS: NOT AT ALL
10. IF YOU CHECKED OFF ANY PROBLEMS, HOW DIFFICULT HAVE THESE PROBLEMS MADE IT FOR YOU TO DO YOUR WORK, TAKE CARE OF THINGS AT HOME, OR GET ALONG WITH OTHER PEOPLE: NOT DIFFICULT AT ALL
6. FEELING BAD ABOUT YOURSELF - OR THAT YOU ARE A FAILURE OR HAVE LET YOURSELF OR YOUR FAMILY DOWN: NOT AT ALL
5. POOR APPETITE OR OVEREATING: NOT AT ALL
SUM OF ALL RESPONSES TO PHQ QUESTIONS 1-9: 0

## 2024-04-11 ASSESSMENT — ENCOUNTER SYMPTOMS
EYE REDNESS: 0
CONSTIPATION: 0
BACK PAIN: 1
DIARRHEA: 0
SORE THROAT: 0
VISUAL CHANGE: 0
COUGH: 1
ABDOMINAL PAIN: 0
CHEST TIGHTNESS: 0
VOMITING: 0
WHEEZING: 0
EYE ITCHING: 0
EYE PAIN: 0
SHORTNESS OF BREATH: 0
BLOOD IN STOOL: 0
SINUS PRESSURE: 0
APNEA: 0
NAUSEA: 0
RHINORRHEA: 0
COLOR CHANGE: 0

## 2024-04-11 NOTE — PROGRESS NOTES
Chief Complaint:     Chief Complaint   Patient presents with    Arthritis     Med not helping, stopped the celebrex, went back on the diclofenac     Hypertension    Generalized Body Aches    Mental Health Problem    Fatigue         Arthritis  Associated symptoms include fatigue. Pertinent negatives include no diarrhea, dysuria, fever or rash.   Hypertension  This is a chronic problem. The current episode started more than 1 year ago. The problem has been waxing and waning since onset. The problem is uncontrolled. Pertinent negatives include no chest pain, headaches, neck pain, palpitations or shortness of breath. There are no associated agents to hypertension. Risk factors for coronary artery disease include stress, obesity and post-menopausal state. Past treatments include angiotensin blockers, beta blockers and calcium channel blockers. The current treatment provides moderate improvement. There is no history of CVA or PVD. There is no history of renovascular disease, sleep apnea or a thyroid problem.   Generalized Body Aches  This is a recurrent problem. The current episode started more than 1 month ago. The problem occurs daily. The problem has been waxing and waning. Associated symptoms include arthralgias, congestion, coughing, fatigue, myalgias, numbness and weakness. Pertinent negatives include no abdominal pain, chest pain, chills, fever, headaches, nausea, neck pain, rash, sore throat, visual change or vomiting. Nothing aggravates the symptoms. She has tried nothing for the symptoms. The treatment provided no relief.   Mental Health Problem  The primary symptoms do not include dysphoric mood.   Additional symptoms of the illness include fatigue. Additional symptoms of the illness do not include appetite change, visual change, headaches or abdominal pain.   Fatigue  This is a new problem. The current episode started more than 1 month ago. The problem occurs intermittently. The problem has been waxing and

## 2024-05-12 DIAGNOSIS — M54.32 BILATERAL SCIATICA: ICD-10-CM

## 2024-05-12 DIAGNOSIS — G62.9 NEUROPATHY: ICD-10-CM

## 2024-05-12 DIAGNOSIS — M54.31 BILATERAL SCIATICA: ICD-10-CM

## 2024-05-12 DIAGNOSIS — M79.10 MYALGIA: ICD-10-CM

## 2024-05-13 RX ORDER — PREGABALIN 50 MG/1
50 CAPSULE ORAL 2 TIMES DAILY
Qty: 60 CAPSULE | Refills: 2 | Status: SHIPPED | OUTPATIENT
Start: 2024-05-13 | End: 2024-08-11

## 2024-06-10 RX ORDER — DULOXETIN HYDROCHLORIDE 30 MG/1
30 CAPSULE, DELAYED RELEASE ORAL DAILY
Qty: 90 CAPSULE | Refills: 0 | Status: SHIPPED | OUTPATIENT
Start: 2024-06-10

## 2024-06-24 RX ORDER — LOSARTAN POTASSIUM 50 MG/1
TABLET ORAL
Qty: 180 TABLET | Refills: 0 | Status: SHIPPED
Start: 2024-06-24 | End: 2024-06-25

## 2024-06-24 RX ORDER — BUPROPION HYDROCHLORIDE 150 MG/1
TABLET ORAL
Qty: 90 TABLET | Refills: 0 | Status: SHIPPED
Start: 2024-06-24 | End: 2024-06-25

## 2024-06-24 RX ORDER — AMLODIPINE BESYLATE 10 MG/1
TABLET ORAL
Qty: 90 TABLET | Refills: 0 | Status: SHIPPED
Start: 2024-06-24 | End: 2024-06-25

## 2024-06-24 RX ORDER — METOPROLOL TARTRATE 50 MG/1
TABLET, FILM COATED ORAL
Qty: 180 TABLET | Refills: 0 | Status: SHIPPED
Start: 2024-06-24 | End: 2024-06-25

## 2024-06-25 RX ORDER — BUPROPION HYDROCHLORIDE 150 MG/1
TABLET ORAL
Qty: 90 TABLET | Refills: 0 | Status: SHIPPED | OUTPATIENT
Start: 2024-06-25

## 2024-06-25 RX ORDER — AMLODIPINE BESYLATE 10 MG/1
TABLET ORAL
Qty: 90 TABLET | Refills: 0 | Status: SHIPPED | OUTPATIENT
Start: 2024-06-25

## 2024-06-25 RX ORDER — METOPROLOL TARTRATE 50 MG/1
TABLET, FILM COATED ORAL
Qty: 180 TABLET | Refills: 0 | Status: SHIPPED | OUTPATIENT
Start: 2024-06-25

## 2024-06-25 RX ORDER — LOSARTAN POTASSIUM 50 MG/1
TABLET ORAL
Qty: 180 TABLET | Refills: 0 | Status: SHIPPED | OUTPATIENT
Start: 2024-06-25

## 2024-08-03 DIAGNOSIS — M79.10 MYALGIA: ICD-10-CM

## 2024-08-03 DIAGNOSIS — G62.9 NEUROPATHY: ICD-10-CM

## 2024-08-03 DIAGNOSIS — M54.32 BILATERAL SCIATICA: ICD-10-CM

## 2024-08-03 DIAGNOSIS — M54.31 BILATERAL SCIATICA: ICD-10-CM

## 2024-08-05 RX ORDER — PREGABALIN 50 MG/1
50 CAPSULE ORAL 2 TIMES DAILY
Qty: 60 CAPSULE | Refills: 0 | Status: SHIPPED | OUTPATIENT
Start: 2024-08-05 | End: 2024-09-04

## 2024-09-09 DIAGNOSIS — M54.31 BILATERAL SCIATICA: ICD-10-CM

## 2024-09-09 DIAGNOSIS — G62.9 NEUROPATHY: ICD-10-CM

## 2024-09-09 DIAGNOSIS — M54.32 BILATERAL SCIATICA: ICD-10-CM

## 2024-09-09 DIAGNOSIS — M79.10 MYALGIA: ICD-10-CM

## 2024-09-09 RX ORDER — PREGABALIN 50 MG/1
50 CAPSULE ORAL 2 TIMES DAILY
Qty: 60 CAPSULE | Refills: 2 | Status: SHIPPED | OUTPATIENT
Start: 2024-09-09 | End: 2024-12-08

## 2024-12-17 RX ORDER — METOPROLOL TARTRATE 50 MG
TABLET ORAL
Qty: 180 TABLET | Refills: 0 | Status: SHIPPED | OUTPATIENT
Start: 2024-12-17

## 2024-12-17 RX ORDER — LOSARTAN POTASSIUM 50 MG/1
TABLET ORAL
Qty: 180 TABLET | Refills: 0 | Status: SHIPPED | OUTPATIENT
Start: 2024-12-17

## 2024-12-17 RX ORDER — AMLODIPINE BESYLATE 10 MG/1
TABLET ORAL
Qty: 90 TABLET | Refills: 0 | Status: SHIPPED | OUTPATIENT
Start: 2024-12-17

## 2025-01-23 ENCOUNTER — OFFICE VISIT (OUTPATIENT)
Dept: PRIMARY CARE CLINIC | Age: 60
End: 2025-01-23
Payer: COMMERCIAL

## 2025-01-23 VITALS
HEART RATE: 71 BPM | BODY MASS INDEX: 40.63 KG/M2 | RESPIRATION RATE: 16 BRPM | HEIGHT: 64 IN | WEIGHT: 238 LBS | SYSTOLIC BLOOD PRESSURE: 124 MMHG | TEMPERATURE: 98.3 F | OXYGEN SATURATION: 96 % | DIASTOLIC BLOOD PRESSURE: 68 MMHG

## 2025-01-23 DIAGNOSIS — Z12.11 SCREENING FOR MALIGNANT NEOPLASM OF COLON: ICD-10-CM

## 2025-01-23 DIAGNOSIS — E66.812 CLASS 2 SEVERE OBESITY DUE TO EXCESS CALORIES WITH SERIOUS COMORBIDITY AND BODY MASS INDEX (BMI) OF 39.0 TO 39.9 IN ADULT: ICD-10-CM

## 2025-01-23 DIAGNOSIS — E66.01 CLASS 2 SEVERE OBESITY DUE TO EXCESS CALORIES WITH SERIOUS COMORBIDITY AND BODY MASS INDEX (BMI) OF 39.0 TO 39.9 IN ADULT: ICD-10-CM

## 2025-01-23 DIAGNOSIS — Z12.31 ENCOUNTER FOR SCREENING MAMMOGRAM FOR BREAST CANCER: ICD-10-CM

## 2025-01-23 DIAGNOSIS — I10 ESSENTIAL HYPERTENSION: ICD-10-CM

## 2025-01-23 DIAGNOSIS — G62.9 NEUROPATHY: ICD-10-CM

## 2025-01-23 DIAGNOSIS — M54.42 CHRONIC BILATERAL LOW BACK PAIN WITH BILATERAL SCIATICA: ICD-10-CM

## 2025-01-23 DIAGNOSIS — R53.83 FATIGUE, UNSPECIFIED TYPE: ICD-10-CM

## 2025-01-23 DIAGNOSIS — Z00.01 ENCOUNTER FOR WELL ADULT EXAM WITH ABNORMAL FINDINGS: Primary | ICD-10-CM

## 2025-01-23 DIAGNOSIS — Z00.01 ENCOUNTER FOR WELL ADULT EXAM WITH ABNORMAL FINDINGS: ICD-10-CM

## 2025-01-23 DIAGNOSIS — F32.1 CURRENT MODERATE EPISODE OF MAJOR DEPRESSIVE DISORDER WITHOUT PRIOR EPISODE (HCC): ICD-10-CM

## 2025-01-23 DIAGNOSIS — G89.29 CHRONIC BILATERAL LOW BACK PAIN WITH BILATERAL SCIATICA: ICD-10-CM

## 2025-01-23 DIAGNOSIS — M54.41 CHRONIC BILATERAL LOW BACK PAIN WITH BILATERAL SCIATICA: ICD-10-CM

## 2025-01-23 DIAGNOSIS — M54.16 LUMBAR RADICULOPATHY: ICD-10-CM

## 2025-01-23 PROBLEM — R76.8 ANA POSITIVE: Status: RESOLVED | Noted: 2019-07-22 | Resolved: 2025-01-23

## 2025-01-23 PROBLEM — M25.512 CHRONIC LEFT SHOULDER PAIN: Status: RESOLVED | Noted: 2019-07-22 | Resolved: 2025-01-23

## 2025-01-23 PROBLEM — M79.10 MYALGIA: Status: RESOLVED | Noted: 2017-08-31 | Resolved: 2025-01-23

## 2025-01-23 PROBLEM — M13.0 POLYARTHRITIS: Status: RESOLVED | Noted: 2019-07-22 | Resolved: 2025-01-23

## 2025-01-23 PROBLEM — M77.32 HEEL SPUR, LEFT: Status: RESOLVED | Noted: 2021-03-12 | Resolved: 2025-01-23

## 2025-01-23 LAB
ALBUMIN: 4.2 G/DL (ref 3.5–5.2)
ALP BLD-CCNC: 66 U/L (ref 35–104)
ALT SERPL-CCNC: 16 U/L (ref 0–32)
ANION GAP SERPL CALCULATED.3IONS-SCNC: 14 MMOL/L (ref 7–16)
AST SERPL-CCNC: 17 U/L (ref 0–31)
BILIRUB SERPL-MCNC: 0.5 MG/DL (ref 0–1.2)
BUN BLDV-MCNC: 15 MG/DL (ref 6–23)
CALCIUM SERPL-MCNC: 9.6 MG/DL (ref 8.6–10.2)
CHLORIDE BLD-SCNC: 104 MMOL/L (ref 98–107)
CHOLESTEROL, TOTAL: 212 MG/DL
CO2: 24 MMOL/L (ref 22–29)
CREAT SERPL-MCNC: 0.6 MG/DL (ref 0.5–1)
GFR, ESTIMATED: >90 ML/MIN/1.73M2
GLUCOSE BLD-MCNC: 108 MG/DL (ref 74–99)
HCT VFR BLD CALC: 44.4 % (ref 34–48)
HDLC SERPL-MCNC: 65 MG/DL
HEMOGLOBIN: 14.3 G/DL (ref 11.5–15.5)
LDL CHOLESTEROL: 116 MG/DL
MCH RBC QN AUTO: 28.1 PG (ref 26–35)
MCHC RBC AUTO-ENTMCNC: 32.2 G/DL (ref 32–34.5)
MCV RBC AUTO: 87.4 FL (ref 80–99.9)
PDW BLD-RTO: 13.4 % (ref 11.5–15)
PLATELET # BLD: 287 K/UL (ref 130–450)
PMV BLD AUTO: 10.9 FL (ref 7–12)
POTASSIUM SERPL-SCNC: 4.5 MMOL/L (ref 3.5–5)
RBC # BLD: 5.08 M/UL (ref 3.5–5.5)
SODIUM BLD-SCNC: 142 MMOL/L (ref 132–146)
TOTAL PROTEIN: 7 G/DL (ref 6.4–8.3)
TRIGL SERPL-MCNC: 155 MG/DL
TSH SERPL DL<=0.05 MIU/L-ACNC: 1.75 UIU/ML (ref 0.27–4.2)
VLDLC SERPL CALC-MCNC: 31 MG/DL
WBC # BLD: 7.5 K/UL (ref 4.5–11.5)

## 2025-01-23 PROCEDURE — 3078F DIAST BP <80 MM HG: CPT | Performed by: FAMILY MEDICINE

## 2025-01-23 PROCEDURE — 99396 PREV VISIT EST AGE 40-64: CPT | Performed by: FAMILY MEDICINE

## 2025-01-23 PROCEDURE — 3074F SYST BP LT 130 MM HG: CPT | Performed by: FAMILY MEDICINE

## 2025-01-23 RX ORDER — LOSARTAN POTASSIUM 100 MG/1
100 TABLET ORAL DAILY
Qty: 90 TABLET | Refills: 1 | Status: SHIPPED | OUTPATIENT
Start: 2025-01-23

## 2025-01-23 RX ORDER — METOPROLOL TARTRATE 50 MG
TABLET ORAL
Qty: 180 TABLET | Refills: 1 | Status: SHIPPED | OUTPATIENT
Start: 2025-01-23

## 2025-01-23 RX ORDER — SEMAGLUTIDE 0.25 MG/.5ML
0.25 INJECTION, SOLUTION SUBCUTANEOUS
Qty: 2 ML | Refills: 0 | Status: SHIPPED | OUTPATIENT
Start: 2025-01-23

## 2025-01-23 RX ORDER — AMLODIPINE BESYLATE 10 MG/1
10 TABLET ORAL DAILY
Qty: 90 TABLET | Refills: 1 | Status: SHIPPED | OUTPATIENT
Start: 2025-01-23

## 2025-01-23 ASSESSMENT — ENCOUNTER SYMPTOMS
VOMITING: 0
CONSTIPATION: 0
WHEEZING: 0
EYE PAIN: 0
EYE ITCHING: 0
DIARRHEA: 0
NAUSEA: 0
ABDOMINAL PAIN: 0
COLOR CHANGE: 0
COUGH: 0
SHORTNESS OF BREATH: 0
BACK PAIN: 0
SORE THROAT: 0
RHINORRHEA: 0
APNEA: 0
EYE REDNESS: 0
CHEST TIGHTNESS: 0
SINUS PRESSURE: 0
BLOOD IN STOOL: 0

## 2025-01-23 NOTE — PROGRESS NOTES
reversed in 2003     History reviewed. No pertinent family history.  Social History     Tobacco Use    Smoking status: Former     Current packs/day: 0.00     Average packs/day: 0.5 packs/day for 35.0 years (17.5 ttl pk-yrs)     Types: Cigarettes     Start date: 5/20/1974     Quit date: 5/20/2009     Years since quitting: 15.6    Smokeless tobacco: Never   Vaping Use    Vaping status: Never Used   Substance Use Topics    Alcohol use: Yes     Comment: socially    Drug use: Never           Objective    Vital Signs  /68   Pulse 71   Temp 98.3 °F (36.8 °C)   Resp 16   Ht 1.626 m (5' 4.02\")   Wt 108 kg (238 lb)   SpO2 96%   BMI 40.83 kg/m²     Wt Readings from Last 3 Encounters:   01/23/25 108 kg (238 lb)   04/11/24 105.7 kg (233 lb)   03/29/24 105.7 kg (233 lb)       Physical Exam  Vitals and nursing note reviewed.   Constitutional:       General: She is not in acute distress.     Appearance: Normal appearance. She is well-developed.   HENT:      Head: Normocephalic and atraumatic.      Right Ear: Hearing, tympanic membrane and external ear normal. No tenderness. No middle ear effusion.      Left Ear: Hearing, tympanic membrane and external ear normal. No tenderness.  No middle ear effusion.      Nose: Nose normal. No congestion or rhinorrhea.      Right Turbinates: Not enlarged.      Left Turbinates: Not enlarged.      Mouth/Throat:      Mouth: Mucous membranes are moist.      Tongue: No lesions.      Pharynx: Oropharynx is clear. No oropharyngeal exudate or posterior oropharyngeal erythema.   Eyes:      General: No scleral icterus.     Conjunctiva/sclera: Conjunctivae normal.      Pupils: Pupils are equal, round, and reactive to light.   Neck:      Thyroid: No thyromegaly.   Cardiovascular:      Rate and Rhythm: Normal rate and regular rhythm.      Heart sounds: Normal heart sounds. No murmur heard.  Pulmonary:      Effort: Pulmonary effort is normal. No respiratory distress.      Breath sounds: Normal

## 2025-01-29 ENCOUNTER — HOSPITAL ENCOUNTER (INPATIENT)
Age: 60
LOS: 2 days | Discharge: HOME | DRG: 377 | End: 2025-01-31
Attending: EMERGENCY MEDICINE | Admitting: INTERNAL MEDICINE
Payer: COMMERCIAL

## 2025-01-29 DIAGNOSIS — K92.0 GASTROINTESTINAL HEMORRHAGE WITH HEMATEMESIS: ICD-10-CM

## 2025-01-29 DIAGNOSIS — K92.2 GASTROINTESTINAL HEMORRHAGE, UNSPECIFIED GASTROINTESTINAL HEMORRHAGE TYPE: ICD-10-CM

## 2025-01-29 DIAGNOSIS — K92.0 HEMATEMESIS WITH NAUSEA: Primary | ICD-10-CM

## 2025-01-29 PROBLEM — D62 ACUTE BLOOD LOSS ANEMIA: Status: ACTIVE | Noted: 2025-01-29

## 2025-01-29 PROBLEM — I95.9 HYPOTENSION: Status: ACTIVE | Noted: 2025-01-29

## 2025-01-29 PROBLEM — E66.813 CLASS 3 SEVERE OBESITY DUE TO EXCESS CALORIES WITH SERIOUS COMORBIDITY AND BODY MASS INDEX (BMI) OF 40.0 TO 44.9 IN ADULT: Status: ACTIVE | Noted: 2021-03-12

## 2025-01-29 LAB
ALBUMIN SERPL-MCNC: 3.7 G/DL (ref 3.5–5.2)
ALP SERPL-CCNC: 50 U/L (ref 35–104)
ALT SERPL-CCNC: 15 U/L (ref 0–32)
ANION GAP SERPL CALCULATED.3IONS-SCNC: 9 MMOL/L (ref 7–16)
AST SERPL-CCNC: 12 U/L (ref 0–31)
BASOPHILS # BLD: 0.05 K/UL (ref 0–0.2)
BASOPHILS NFR BLD: 0 % (ref 0–2)
BILIRUB SERPL-MCNC: 0.3 MG/DL (ref 0–1.2)
BILIRUB UR QL STRIP: NEGATIVE
BUN SERPL-MCNC: 35 MG/DL (ref 6–23)
CALCIUM SERPL-MCNC: 8.8 MG/DL (ref 8.6–10.2)
CHLORIDE SERPL-SCNC: 103 MMOL/L (ref 98–107)
CLARITY UR: CLEAR
CO2 SERPL-SCNC: 23 MMOL/L (ref 22–29)
COLOR UR: YELLOW
CREAT SERPL-MCNC: 1 MG/DL (ref 0.5–1)
EOSINOPHIL # BLD: 0.03 K/UL (ref 0.05–0.5)
EOSINOPHILS RELATIVE PERCENT: 0 % (ref 0–6)
EPI CELLS #/AREA URNS HPF: ABNORMAL /HPF
ERYTHROCYTE [DISTWIDTH] IN BLOOD BY AUTOMATED COUNT: 13.5 % (ref 11.5–15)
ERYTHROCYTE [DISTWIDTH] IN BLOOD BY AUTOMATED COUNT: 13.9 % (ref 11.5–15)
GFR, ESTIMATED: 63 ML/MIN/1.73M2
GLUCOSE SERPL-MCNC: 137 MG/DL (ref 74–99)
GLUCOSE UR STRIP-MCNC: NEGATIVE MG/DL
HCT VFR BLD AUTO: 28.2 % (ref 34–48)
HCT VFR BLD AUTO: 30.7 % (ref 34–48)
HGB BLD-MCNC: 9.1 G/DL (ref 11.5–15.5)
HGB BLD-MCNC: 9.9 G/DL (ref 11.5–15.5)
HGB UR QL STRIP.AUTO: NEGATIVE
IMM GRANULOCYTES # BLD AUTO: 0.12 K/UL (ref 0–0.58)
IMM GRANULOCYTES NFR BLD: 1 % (ref 0–5)
KETONES UR STRIP-MCNC: NEGATIVE MG/DL
LACTATE BLDV-SCNC: 2.6 MMOL/L (ref 0.5–2.2)
LACTATE BLDV-SCNC: 3.1 MMOL/L (ref 0.5–2.2)
LACTATE BLDV-SCNC: 3.4 MMOL/L (ref 0.5–2.2)
LEUKOCYTE ESTERASE UR QL STRIP: NEGATIVE
LIPASE SERPL-CCNC: 33 U/L (ref 13–60)
LYMPHOCYTES NFR BLD: 1.83 K/UL (ref 1.5–4)
LYMPHOCYTES RELATIVE PERCENT: 12 % (ref 20–42)
MCH RBC QN AUTO: 28.5 PG (ref 26–35)
MCH RBC QN AUTO: 28.6 PG (ref 26–35)
MCHC RBC AUTO-ENTMCNC: 32.2 G/DL (ref 32–34.5)
MCHC RBC AUTO-ENTMCNC: 32.3 G/DL (ref 32–34.5)
MCV RBC AUTO: 88.5 FL (ref 80–99.9)
MCV RBC AUTO: 88.7 FL (ref 80–99.9)
MONOCYTES NFR BLD: 0.7 K/UL (ref 0.1–0.95)
MONOCYTES NFR BLD: 5 % (ref 2–12)
NEUTROPHILS NFR BLD: 82 % (ref 43–80)
NEUTS SEG NFR BLD: 12.12 K/UL (ref 1.8–7.3)
NITRITE UR QL STRIP: NEGATIVE
PH UR STRIP: 6 [PH] (ref 5–9)
PLATELET # BLD AUTO: 201 K/UL (ref 130–450)
PLATELET # BLD AUTO: 316 K/UL (ref 130–450)
PMV BLD AUTO: 10.5 FL (ref 7–12)
PMV BLD AUTO: 10.7 FL (ref 7–12)
POTASSIUM SERPL-SCNC: 4.8 MMOL/L (ref 3.5–5)
PROT SERPL-MCNC: 6 G/DL (ref 6.4–8.3)
PROT UR STRIP-MCNC: NEGATIVE MG/DL
RBC # BLD AUTO: 3.18 M/UL (ref 3.5–5.5)
RBC # BLD AUTO: 3.47 M/UL (ref 3.5–5.5)
RBC #/AREA URNS HPF: ABNORMAL /HPF
SODIUM SERPL-SCNC: 135 MMOL/L (ref 132–146)
SP GR UR STRIP: <1.005 (ref 1–1.03)
TROPONIN I SERPL HS-MCNC: 99 NG/L (ref 0–9)
UROBILINOGEN UR STRIP-ACNC: 0.2 EU/DL (ref 0–1)
WBC #/AREA URNS HPF: ABNORMAL /HPF
WBC OTHER # BLD: 11.8 K/UL (ref 4.5–11.5)
WBC OTHER # BLD: 14.9 K/UL (ref 4.5–11.5)

## 2025-01-29 PROCEDURE — 85025 COMPLETE CBC W/AUTO DIFF WBC: CPT

## 2025-01-29 PROCEDURE — 6360000002 HC RX W HCPCS: Performed by: EMERGENCY MEDICINE

## 2025-01-29 PROCEDURE — 2500000003 HC RX 250 WO HCPCS: Performed by: INTERNAL MEDICINE

## 2025-01-29 PROCEDURE — 36430 TRANSFUSION BLD/BLD COMPNT: CPT

## 2025-01-29 PROCEDURE — 99285 EMERGENCY DEPT VISIT HI MDM: CPT

## 2025-01-29 PROCEDURE — 30233N1 TRANSFUSION OF NONAUTOLOGOUS RED BLOOD CELLS INTO PERIPHERAL VEIN, PERCUTANEOUS APPROACH: ICD-10-PCS | Performed by: STUDENT IN AN ORGANIZED HEALTH CARE EDUCATION/TRAINING PROGRAM

## 2025-01-29 PROCEDURE — 99223 1ST HOSP IP/OBS HIGH 75: CPT | Performed by: INTERNAL MEDICINE

## 2025-01-29 PROCEDURE — 86850 RBC ANTIBODY SCREEN: CPT

## 2025-01-29 PROCEDURE — 85027 COMPLETE CBC AUTOMATED: CPT

## 2025-01-29 PROCEDURE — 83690 ASSAY OF LIPASE: CPT

## 2025-01-29 PROCEDURE — 86923 COMPATIBILITY TEST ELECTRIC: CPT

## 2025-01-29 PROCEDURE — 84484 ASSAY OF TROPONIN QUANT: CPT

## 2025-01-29 PROCEDURE — 82728 ASSAY OF FERRITIN: CPT

## 2025-01-29 PROCEDURE — P9016 RBC LEUKOCYTES REDUCED: HCPCS

## 2025-01-29 PROCEDURE — 80053 COMPREHEN METABOLIC PANEL: CPT

## 2025-01-29 PROCEDURE — 83550 IRON BINDING TEST: CPT

## 2025-01-29 PROCEDURE — 2060000000 HC ICU INTERMEDIATE R&B

## 2025-01-29 PROCEDURE — 86900 BLOOD TYPING SEROLOGIC ABO: CPT

## 2025-01-29 PROCEDURE — 2580000003 HC RX 258: Performed by: INTERNAL MEDICINE

## 2025-01-29 PROCEDURE — 2580000003 HC RX 258: Performed by: EMERGENCY MEDICINE

## 2025-01-29 PROCEDURE — 81001 URINALYSIS AUTO W/SCOPE: CPT

## 2025-01-29 PROCEDURE — 83605 ASSAY OF LACTIC ACID: CPT

## 2025-01-29 PROCEDURE — 86901 BLOOD TYPING SEROLOGIC RH(D): CPT

## 2025-01-29 PROCEDURE — 83540 ASSAY OF IRON: CPT

## 2025-01-29 RX ORDER — ONDANSETRON 4 MG/1
4 TABLET, ORALLY DISINTEGRATING ORAL EVERY 8 HOURS PRN
Status: DISCONTINUED | OUTPATIENT
Start: 2025-01-29 | End: 2025-01-31 | Stop reason: HOSPADM

## 2025-01-29 RX ORDER — LOSARTAN POTASSIUM 50 MG/1
100 TABLET ORAL DAILY
Status: DISCONTINUED | OUTPATIENT
Start: 2025-01-30 | End: 2025-01-31 | Stop reason: HOSPADM

## 2025-01-29 RX ORDER — SODIUM CHLORIDE, SODIUM LACTATE, POTASSIUM CHLORIDE, AND CALCIUM CHLORIDE .6; .31; .03; .02 G/100ML; G/100ML; G/100ML; G/100ML
1000 INJECTION, SOLUTION INTRAVENOUS ONCE
Status: COMPLETED | OUTPATIENT
Start: 2025-01-29 | End: 2025-01-29

## 2025-01-29 RX ORDER — ASPIRIN 81 MG/1
81 TABLET, CHEWABLE ORAL DAILY
Status: DISCONTINUED | OUTPATIENT
Start: 2025-01-30 | End: 2025-01-31 | Stop reason: HOSPADM

## 2025-01-29 RX ORDER — SODIUM CHLORIDE 0.9 % (FLUSH) 0.9 %
5-40 SYRINGE (ML) INJECTION PRN
Status: DISCONTINUED | OUTPATIENT
Start: 2025-01-29 | End: 2025-01-31 | Stop reason: HOSPADM

## 2025-01-29 RX ORDER — 0.9 % SODIUM CHLORIDE 0.9 %
30 INTRAVENOUS SOLUTION INTRAVENOUS ONCE
Status: COMPLETED | OUTPATIENT
Start: 2025-01-29 | End: 2025-01-29

## 2025-01-29 RX ORDER — ACETAMINOPHEN 650 MG/1
650 SUPPOSITORY RECTAL EVERY 6 HOURS PRN
Status: DISCONTINUED | OUTPATIENT
Start: 2025-01-29 | End: 2025-01-31 | Stop reason: HOSPADM

## 2025-01-29 RX ORDER — SODIUM CHLORIDE, SODIUM LACTATE, POTASSIUM CHLORIDE, CALCIUM CHLORIDE 600; 310; 30; 20 MG/100ML; MG/100ML; MG/100ML; MG/100ML
INJECTION, SOLUTION INTRAVENOUS CONTINUOUS
Status: DISCONTINUED | OUTPATIENT
Start: 2025-01-29 | End: 2025-01-30

## 2025-01-29 RX ORDER — PANTOPRAZOLE SODIUM 40 MG/10ML
80 INJECTION, POWDER, LYOPHILIZED, FOR SOLUTION INTRAVENOUS ONCE
Status: COMPLETED | OUTPATIENT
Start: 2025-01-29 | End: 2025-01-29

## 2025-01-29 RX ORDER — SODIUM CHLORIDE 9 MG/ML
INJECTION, SOLUTION INTRAVENOUS PRN
Status: DISCONTINUED | OUTPATIENT
Start: 2025-01-29 | End: 2025-01-31 | Stop reason: HOSPADM

## 2025-01-29 RX ORDER — AMLODIPINE BESYLATE 10 MG/1
10 TABLET ORAL DAILY
Status: DISCONTINUED | OUTPATIENT
Start: 2025-01-30 | End: 2025-01-31 | Stop reason: HOSPADM

## 2025-01-29 RX ORDER — ACETAMINOPHEN 325 MG/1
650 TABLET ORAL EVERY 6 HOURS PRN
Status: DISCONTINUED | OUTPATIENT
Start: 2025-01-29 | End: 2025-01-31 | Stop reason: HOSPADM

## 2025-01-29 RX ORDER — ONDANSETRON 2 MG/ML
4 INJECTION INTRAMUSCULAR; INTRAVENOUS EVERY 6 HOURS PRN
Status: DISCONTINUED | OUTPATIENT
Start: 2025-01-29 | End: 2025-01-31 | Stop reason: HOSPADM

## 2025-01-29 RX ORDER — SODIUM CHLORIDE 0.9 % (FLUSH) 0.9 %
5-40 SYRINGE (ML) INJECTION EVERY 12 HOURS SCHEDULED
Status: DISCONTINUED | OUTPATIENT
Start: 2025-01-29 | End: 2025-01-31 | Stop reason: HOSPADM

## 2025-01-29 RX ORDER — METOPROLOL TARTRATE 50 MG
50 TABLET ORAL 2 TIMES DAILY
Status: DISCONTINUED | OUTPATIENT
Start: 2025-01-29 | End: 2025-01-31

## 2025-01-29 RX ADMIN — SODIUM CHLORIDE, POTASSIUM CHLORIDE, SODIUM LACTATE AND CALCIUM CHLORIDE 1000 ML: 600; 310; 30; 20 INJECTION, SOLUTION INTRAVENOUS at 20:31

## 2025-01-29 RX ADMIN — SODIUM CHLORIDE, POTASSIUM CHLORIDE, SODIUM LACTATE AND CALCIUM CHLORIDE 1000 ML: 600; 310; 30; 20 INJECTION, SOLUTION INTRAVENOUS at 19:25

## 2025-01-29 RX ADMIN — SODIUM CHLORIDE 3240 ML: 9 INJECTION, SOLUTION INTRAVENOUS at 16:09

## 2025-01-29 RX ADMIN — SODIUM CHLORIDE, POTASSIUM CHLORIDE, SODIUM LACTATE AND CALCIUM CHLORIDE: 600; 310; 30; 20 INJECTION, SOLUTION INTRAVENOUS at 22:35

## 2025-01-29 RX ADMIN — PANTOPRAZOLE SODIUM 80 MG: 40 INJECTION, POWDER, FOR SOLUTION INTRAVENOUS at 18:41

## 2025-01-29 RX ADMIN — SODIUM CHLORIDE, PRESERVATIVE FREE 10 ML: 5 INJECTION INTRAVENOUS at 22:35

## 2025-01-29 ASSESSMENT — PAIN - FUNCTIONAL ASSESSMENT: PAIN_FUNCTIONAL_ASSESSMENT: NONE - DENIES PAIN

## 2025-01-29 NOTE — H&P
History and Physical      CHIEF COMPLAINT: Hematemesis      HISTORY OF PRESENT ILLNESS:      The patient is a 59 y.o. female patient of Dr. Ceja history of arthritis who presents with hematemesis and maroon blood per rectum.  Patient notes that she has been vomiting blood.  She states that she started having abdominal pain and then subsequently had vomiting of blood with passage of maroon stool at same time.  Describes pain as stabbing pain in the epigastrium.  She does have a long history of NSAID use chronically on diclofenac and subsequent switch to naproxen up to twice a day.  No fevers or chills.  No chest pain or trouble breathing.    In the ED patient was hypotensive at 84/ 54.  Her hemoglobin had dropped to 9.9 down from 14.3.  She was transfused 1 unit of blood treated with IV Protonix.  She will be admitted for further evaluation and treatment.    Past Medical History:    Past Medical History:   Diagnosis Date    Arthritis     osteoarthritis    Essential hypertension 2017       Past Surgical History:    Past Surgical History:   Procedure Laterality Date    ARTHRODESIS Left 2022    ARTHRODESIS FIRST DIGIT LEFT FOOT performed by Pedro Luis Toth DPM at [facility] OR     SECTION      TUBAL LIGATION      then reversed in        Medications Prior to Admission:    Not in a hospital admission.    Allergies:    Patient has no known allergies.    Social History:    reports that she quit smoking about 15 years ago. Her smoking use included cigarettes. She started smoking about 50 years ago. She has a 17.5 pack-year smoking history. She has never used smokeless tobacco. She reports current alcohol use. She reports that she does not use drugs.    Family History:   family history is not on file.    REVIEW OF SYSTEMS:  As above in the HPI, otherwise negative    PHYSICAL EXAM:    Vitals:  /86   Pulse 66   Temp 98.4 °F (36.9 °C) (Oral)   Resp 16   Ht 1.626 m (5' 4\")   Wt 108 kg (238

## 2025-01-29 NOTE — ED TRIAGE NOTES
Department of Emergency Medicine    FIRST PROVIDER TRIAGE NOTE             Independent MLP           1/29/25  2:12 PM EST    Date of Encounter: 1/29/25   MRN: [id number]    Vitals:    01/29/25 1413   BP: (!) 84/54   Pulse: 85   Resp: 18   Temp: 98.2 °F (36.8 °C)   TempSrc: Oral   SpO2: 100%   Weight: 108 kg (238 lb)   Height: 1.626 m (5' 4\")      HPI: Bijal Walker is a 59 y.o. female who presents to the ED for No chief complaint on file.     LUQ abdominal pain     Not on anticoagulation     ROS: Negative for cp or sob.    Physical Exam:   Gen Appearance/Constitutional: alert  CV: regular     Initial Plan of Care: All treatment areas with department are currently occupied.     Plan to order/Initiate the following while awaiting opening in ED: Triage evaluation  labs.    Provider-Patient relationship only established for Provider In Triage (PIT).  Full assessment, HPI, and examination not performed, therefore, it is not yet possible to state whether or not an emergency medical condition exists.  This provider not responsible to follow or interpret any labs or testing ordered in triage.  Supervisor request for INDIGO to initiate contact and input an assessment note in triage during high volume surges.     Initial Plan of Care: Initiate Treatment-Testing, Proceed toTreatment Area When Bed Available for ED Attending/MLP to Continue Care  Secondary to high volume, low staffing, and/or boarding- patient to await bed availability.    This ends my PIT-Patient relationship.  Care of patient relinquished after triage    Electronically signed by Adelaida Garcia PA-C   DD: 1/29/25

## 2025-01-29 NOTE — ED PROVIDER NOTES
HPI:  25, Time: 3:17 PM ARACELI Walker is a 59 y.o. female presenting to the ED for dizziness near syncope patient reports vomiting up blood had maroon-colored stools onset around 430 this morning., beginning 10 hours ago.  The complaint has been persistent, severe in severity, and worsened by standing.  Patient associated with nausea.  She has never had a colonoscopy.  She is on no blood thinners.  She contacted her PCP advised her to come to the emergency department.  In triage she was found to be hypotensive 84/54.  Blood pressure is improved.  She complains of fatigue and shortness of breath.  She has no chest pain.  Outpatient labs on 2025 had a hemoglobin of 14.3 hematocrit of 44.4.  Repeat hemoglobin today was 9.9 hematocrit was 30.7.    Review of Systems:   A complete review of systems was performed and pertinent positives and negatives are stated within HPI, all other systems reviewed and are negative.    --------------------------------------------- PAST HISTORY ---------------------------------------------  Past Medical History:  has a past medical history of Arthritis and Essential hypertension.    Past Surgical History:  has a past surgical history that includes  section (); Tubal ligation (); and arthrodesis (Left, 2022).    Social History:  reports that she quit smoking about 15 years ago. Her smoking use included cigarettes. She started smoking about 50 years ago. She has a 17.5 pack-year smoking history. She has never used smokeless tobacco. She reports current alcohol use. She reports that she does not use drugs.    Family History: family history is not on file.     The patient’s home medications have been reviewed.    Allergies: Patient has no known allergies.    -------------------------------------------------- RESULTS -------------------------------------------------  All laboratory and radiology results have been personally reviewed by

## 2025-01-30 ENCOUNTER — ANESTHESIA EVENT (OUTPATIENT)
Dept: ENDOSCOPY | Age: 60
End: 2025-01-30
Payer: COMMERCIAL

## 2025-01-30 ENCOUNTER — ANESTHESIA (OUTPATIENT)
Dept: ENDOSCOPY | Age: 60
End: 2025-01-30
Payer: COMMERCIAL

## 2025-01-30 PROBLEM — K92.2 GASTROINTESTINAL HEMORRHAGE: Status: ACTIVE | Noted: 2025-01-30

## 2025-01-30 LAB
ABO/RH: NORMAL
ANTIBODY SCREEN: NEGATIVE
ARM BAND NUMBER: NORMAL
BLOOD BANK BLOOD PRODUCT EXPIRATION DATE: NORMAL
BLOOD BANK DISPENSE STATUS: NORMAL
BLOOD BANK ISBT PRODUCT BLOOD TYPE: 6200
BLOOD BANK PRODUCT CODE: NORMAL
BLOOD BANK SAMPLE EXPIRATION: NORMAL
BLOOD BANK UNIT TYPE AND RH: NORMAL
BPU ID: NORMAL
COMPONENT: NORMAL
CROSSMATCH RESULT: NORMAL
FERRITIN SERPL-MCNC: 123 NG/ML
HCT VFR BLD AUTO: 25.1 % (ref 34–48)
HCT VFR BLD AUTO: 25.8 % (ref 34–48)
HCT VFR BLD AUTO: 27.9 % (ref 34–48)
HGB BLD-MCNC: 8 G/DL (ref 11.5–15.5)
HGB BLD-MCNC: 8.3 G/DL (ref 11.5–15.5)
HGB BLD-MCNC: 8.8 G/DL (ref 11.5–15.5)
INR PPP: 1.1
IRON SATN MFR SERPL: 29 % (ref 15–50)
IRON SERPL-MCNC: 83 UG/DL (ref 37–145)
PARTIAL THROMBOPLASTIN TIME: 19.3 SEC (ref 24.5–35.1)
PROTHROMBIN TIME: 12 SEC (ref 9.3–12.4)
TIBC SERPL-MCNC: 286 UG/DL (ref 250–450)
TRANSFUSION STATUS: NORMAL
UNIT DIVISION: 0
UNIT ISSUE DATE/TIME: NORMAL

## 2025-01-30 PROCEDURE — 2580000003 HC RX 258: Performed by: INTERNAL MEDICINE

## 2025-01-30 PROCEDURE — 2060000000 HC ICU INTERMEDIATE R&B

## 2025-01-30 PROCEDURE — 2500000003 HC RX 250 WO HCPCS: Performed by: INTERNAL MEDICINE

## 2025-01-30 PROCEDURE — 6360000002 HC RX W HCPCS

## 2025-01-30 PROCEDURE — 85610 PROTHROMBIN TIME: CPT

## 2025-01-30 PROCEDURE — 0DB78ZX EXCISION OF STOMACH, PYLORUS, VIA NATURAL OR ARTIFICIAL OPENING ENDOSCOPIC, DIAGNOSTIC: ICD-10-PCS | Performed by: STUDENT IN AN ORGANIZED HEALTH CARE EDUCATION/TRAINING PROGRAM

## 2025-01-30 PROCEDURE — 85730 THROMBOPLASTIN TIME PARTIAL: CPT

## 2025-01-30 PROCEDURE — 6360000002 HC RX W HCPCS: Performed by: INTERNAL MEDICINE

## 2025-01-30 PROCEDURE — 43239 EGD BIOPSY SINGLE/MULTIPLE: CPT | Performed by: SURGERY

## 2025-01-30 PROCEDURE — 99232 SBSQ HOSP IP/OBS MODERATE 35: CPT | Performed by: STUDENT IN AN ORGANIZED HEALTH CARE EDUCATION/TRAINING PROGRAM

## 2025-01-30 PROCEDURE — 2709999900 HC NON-CHARGEABLE SUPPLY: Performed by: SURGERY

## 2025-01-30 PROCEDURE — 99223 1ST HOSP IP/OBS HIGH 75: CPT | Performed by: SURGERY

## 2025-01-30 PROCEDURE — 7100000010 HC PHASE II RECOVERY - FIRST 15 MIN: Performed by: SURGERY

## 2025-01-30 PROCEDURE — 85018 HEMOGLOBIN: CPT

## 2025-01-30 PROCEDURE — 6370000000 HC RX 637 (ALT 250 FOR IP): Performed by: INTERNAL MEDICINE

## 2025-01-30 PROCEDURE — 85014 HEMATOCRIT: CPT

## 2025-01-30 PROCEDURE — 3609012400 HC EGD TRANSORAL BIOPSY SINGLE/MULTIPLE: Performed by: SURGERY

## 2025-01-30 PROCEDURE — 7100000011 HC PHASE II RECOVERY - ADDTL 15 MIN: Performed by: SURGERY

## 2025-01-30 PROCEDURE — 3700000000 HC ANESTHESIA ATTENDED CARE: Performed by: SURGERY

## 2025-01-30 PROCEDURE — 88305 TISSUE EXAM BY PATHOLOGIST: CPT

## 2025-01-30 PROCEDURE — 6370000000 HC RX 637 (ALT 250 FOR IP): Performed by: SURGERY

## 2025-01-30 RX ORDER — PANTOPRAZOLE SODIUM 40 MG/1
40 TABLET, DELAYED RELEASE ORAL
Qty: 60 TABLET | Refills: 1 | Status: SHIPPED | OUTPATIENT
Start: 2025-01-30

## 2025-01-30 RX ORDER — GLYCOPYRROLATE 0.2 MG/ML
INJECTION INTRAMUSCULAR; INTRAVENOUS
Status: DISCONTINUED | OUTPATIENT
Start: 2025-01-30 | End: 2025-01-30 | Stop reason: SDUPTHER

## 2025-01-30 RX ORDER — SUCRALFATE 1 G/1
1 TABLET ORAL 4 TIMES DAILY
Qty: 120 TABLET | Refills: 1 | Status: SHIPPED | OUTPATIENT
Start: 2025-01-30

## 2025-01-30 RX ORDER — LIDOCAINE HYDROCHLORIDE 20 MG/ML
INJECTION, SOLUTION INTRAVENOUS
Status: DISCONTINUED | OUTPATIENT
Start: 2025-01-30 | End: 2025-01-30 | Stop reason: SDUPTHER

## 2025-01-30 RX ORDER — SUCRALFATE 1 G/1
1 TABLET ORAL EVERY 8 HOURS SCHEDULED
Status: DISCONTINUED | OUTPATIENT
Start: 2025-01-30 | End: 2025-01-31 | Stop reason: HOSPADM

## 2025-01-30 RX ORDER — PROPOFOL 10 MG/ML
INJECTION, EMULSION INTRAVENOUS
Status: DISCONTINUED | OUTPATIENT
Start: 2025-01-30 | End: 2025-01-30 | Stop reason: SDUPTHER

## 2025-01-30 RX ADMIN — GLYCOPYRROLATE 0.2 MG: 0.2 INJECTION, SOLUTION INTRAMUSCULAR; INTRAVENOUS at 11:49

## 2025-01-30 RX ADMIN — PROPOFOL 150 MG: 10 INJECTION, EMULSION INTRAVENOUS at 11:49

## 2025-01-30 RX ADMIN — SODIUM CHLORIDE, PRESERVATIVE FREE 40 MG: 5 INJECTION INTRAVENOUS at 06:07

## 2025-01-30 RX ADMIN — SODIUM CHLORIDE, PRESERVATIVE FREE 10 ML: 5 INJECTION INTRAVENOUS at 19:55

## 2025-01-30 RX ADMIN — METOPROLOL TARTRATE 50 MG: 50 TABLET, FILM COATED ORAL at 08:26

## 2025-01-30 RX ADMIN — SODIUM CHLORIDE, POTASSIUM CHLORIDE, SODIUM LACTATE AND CALCIUM CHLORIDE: 600; 310; 30; 20 INJECTION, SOLUTION INTRAVENOUS at 09:41

## 2025-01-30 RX ADMIN — SUCRALFATE 1 G: 1 TABLET ORAL at 22:54

## 2025-01-30 RX ADMIN — LIDOCAINE HYDROCHLORIDE 60 MG: 20 INJECTION, SOLUTION INTRAVENOUS at 11:49

## 2025-01-30 RX ADMIN — METOPROLOL TARTRATE 50 MG: 50 TABLET, FILM COATED ORAL at 19:55

## 2025-01-30 RX ADMIN — SODIUM CHLORIDE, PRESERVATIVE FREE 40 MG: 5 INJECTION INTRAVENOUS at 18:27

## 2025-01-30 ASSESSMENT — LIFESTYLE VARIABLES: SMOKING_STATUS: 0

## 2025-01-30 NOTE — ED NOTES
ED to Inpatient Handoff Report    Notified Charisse that electronic handoff available and patient ready for transport to room 608.    Safety Risks: Risk of falls    Patient in Restraints: no    Constant Observer or Patient : no    Telemetry Monitoring Ordered :Yes           Order to transfer to unit without monitor:N/A    Last MEWS: 2 Time completed: 2021    Deterioration Index Score:   Predictive Model Details          29 (Normal)  Factor Value    Calculated 1/29/2025 21:18 36% Age 59 years old    Deterioration Index Model 23% Respiratory rate 13     14% Potassium 4.8 mmol/L     10% Systolic 100     8% WBC count abnormal (11.8 k/uL)     3% BUN abnormal (35 mg/dL)     3% Hematocrit abnormal (28.2 %)     3% Sodium 135 mmol/L     0% Pulse oximetry 98 %     0% Temperature 98 °F (36.7 °C)     0% Pulse 75        Vitals:    01/29/25 2035 01/29/25 2040 01/29/25 2055 01/29/25 2110   BP: (!) 107/47 (!) 105/50 (!) 96/50 (!) 100/52   Pulse: 78 80 73 75   Resp: 16 18 15 13   Temp:       TempSrc:       SpO2: 98% 98% 98% 98%   Weight:       Height:             Opportunity for questions and clarification was provided.

## 2025-01-30 NOTE — ANESTHESIA POSTPROCEDURE EVALUATION
Department of Anesthesiology  Postprocedure Note    Patient: Bijal Walker  MRN: [id number]  YOB: 1965  Date of evaluation: 1/30/2025    Procedure Summary       Date: 01/30/25 Room / Location: [place] [address] / [facility]    Anesthesia Start: 1148 Anesthesia Stop: 1155    Procedure: ESOPHAGOGASTRODUODENOSCOPY BIOPSY Diagnosis:       Gastrointestinal hemorrhage, unspecified gastrointestinal hemorrhage type      (Gastrointestinal hemorrhage, unspecified gastrointestinal hemorrhage type [K92.2])    Surgeons: Sid Cody MD Responsible Provider: Rajeev Rice DO    Anesthesia Type: MAC ASA Status: 3            Anesthesia Type: MAC    Lenore Phase I:      Lenore Phase II: Lenore Score: 10    Anesthesia Post Evaluation    Level of consciousness: awake  Pain score: 1  Airway patency: patent  Nausea & Vomiting: no vomiting and no nausea  Cardiovascular status: hemodynamically stable  Respiratory status: acceptable  Hydration status: stable  Pain management: adequate    No notable events documented.

## 2025-01-30 NOTE — PROGRESS NOTES
4 Eyes Skin Assessment     NAME:  Bijal Walker  YOB: 1965  MEDICAL RECORD NUMBER:  [id number]    The patient is being assessed for  Admission    I agree that at least one RN has performed a thorough Head to Toe Skin Assessment on the patient. ALL assessment sites listed below have been assessed.      Areas assessed by both nurses:    Head, Face, Ears, Shoulders, Back, Chest, Arms, Elbows, Hands, Sacrum. Buttock, Coccyx, Ischium, Legs. Feet and Heels, and Under Medical Devices         Does the Patient have a Wound? No noted wound(s)       Edward Prevention initiated by RN: Yes  Wound Care Orders initiated by RN: no    Pressure Injury (Stage 3,4, Unstageable, DTI, NWPT, and Complex wounds) if present, place Wound referral order by RN under : No    New Ostomies, if present place, Ostomy referral order under : No     Nurse 1 eSignature: Electronically signed by JASON ALFARO RN on 1/30/25 at 12:53 AM EST    **SHARE this note so that the co-signing nurse can place an eSignature**    Nurse 2 eSignature: Electronically signed by Roselia Anthony RN on 1/30/25 at 12:54 AM EST

## 2025-01-30 NOTE — PROGRESS NOTES
Per update from surgery, okay for DC.    Electronically signed by Cecelia Lopez RN on 1/30/2025 at 5:03 PM

## 2025-01-30 NOTE — PROGRESS NOTES
[facility] Hospitalist Progress Note    Admitting Date and Time: 1/29/2025  2:16 PM  Admit Dx: Hematemesis [K92.0]    Subjective:  Patient is being followed for Hematemesis [K92.0]   Pt feels okay  Per RN: no additional concerns    ROS: denies fever, chills, cp, sob, n/v, HA unless otherwise noted above     [Held by provider] amLODIPine  10 mg Oral Daily    [Held by provider] aspirin  81 mg Oral Daily    [Held by provider] losartan  100 mg Oral Daily    metoprolol tartrate  50 mg Oral BID    sodium chloride flush  5-40 mL IntraVENous 2 times per day    pantoprazole (PROTONIX) 40 mg in sodium chloride (PF) 0.9 % 10 mL injection  40 mg IntraVENous Q12H     sodium chloride, , PRN  sodium chloride flush, 5-40 mL, PRN  sodium chloride, , PRN  ondansetron, 4 mg, Q8H PRN   Or  ondansetron, 4 mg, Q6H PRN  acetaminophen, 650 mg, Q6H PRN   Or  acetaminophen, 650 mg, Q6H PRN         Objective:  BP (!) 108/59   Pulse 77   Temp 98 °F (36.7 °C)   Resp 16   Ht 1.626 m (5' 4\")   Wt 108 kg (238 lb)   SpO2 98%   BMI 40.85 kg/m²     General Appearance: alert and oriented to person, place and time and in NAD, sitting in bed  Skin: warm and dry  Head: normocephalic and atraumatic  Eyes: PERRL, EOMI, conjunctivae normal  Neck: neck supple, trachea midline   Pulmonary/Chest: CTAB, no w/r/r, normal air movement, no respiratory distress, RA  Cardiovascular: RRR, no murmurs  Abdomen: soft, non-tender, non-distended, normal bowel sounds, no masses or organomegaly  Extremities: no cyanosis, no clubbing and no edema  Neurologic: no cranial nerve deficit and speech normal      Recent Labs     01/29/25  1452      K 4.8      CO2 23   BUN 35*   CREATININE 1.0   GLUCOSE 137*   CALCIUM 8.8       Recent Labs     01/29/25  1452 01/29/25  2030 01/30/25  0351   WBC 14.9* 11.8*  --    RBC 3.47* 3.18*  --    HGB 9.9* 9.1* 8.0*   HCT 30.7* 28.2* 25.1*   MCV 88.5 88.7  --    MCH 28.5 28.6  --    MCHC 32.2 32.3  --    RDW 13.5 13.9

## 2025-01-30 NOTE — CONSULTS
GENERAL SURGERY  CONSULT NOTE    Patient's Name/Date of Birth: Bijal Walker / 1965    Date: 2025     PCP: Jignesh Ceja DO      Physician Consulted: Dr. Su  Reason for Consult: Hematemesis  Referring Physician: Dr. Sindhu HARRINGTON  Bijal Walker is a 59 y.o. female who presents for evaluation of dizziness and hematemesis as well as maroon-colored stools beginning yesterday morning.  Patient states for several hours prior to arrival to the ED.  States that she became dizzy and had a near syncopal episode therefore she presented to the ED.  Upon arrival patient was found to be hypotensive with systolics in the pressures improved with fluid resuscitation.  She denies any chest pain or shortness of breath.  Abdominal pain.  Patient has not had a prior EGD or colonoscopy.  Verbal history significant for  and tubal ligation    General surgery has been consulted for evaluation of GI bleed.  Labs reviewed.  Upon arrival patient hemoglobin 9.9, Cr 1. Patient currently hemodynamically stable.       Past Medical History:   Diagnosis Date    Arthritis     osteoarthritis    Essential hypertension 2017       Past Surgical History:   Procedure Laterality Date    ARTHRODESIS Left 2022    ARTHRODESIS FIRST DIGIT LEFT FOOT performed by Pedro Luis Toth DPM at [facility] OR     SECTION      TUBAL LIGATION      then reversed in        Medications Prior to Admission:    Prior to Admission medications    Medication Sig Start Date End Date Taking? Authorizing Provider   amLODIPine (NORVASC) 10 MG tablet Take 1 tablet by mouth daily 25  Yes Jignesh Ceja DO   losartan (COZAAR) 100 MG tablet Take 1 tablet by mouth daily 25  Yes Jignesh Ceja DO   metoprolol tartrate (LOPRESSOR) 50 MG tablet Take 1 tablet by mouth twice daily 25  Yes Jignesh Ceja, DO   aspirin 81 MG chewable tablet Take 1 tablet by mouth daily   Yes Provider, MD Sharlene   Semaglutide-Weight

## 2025-01-30 NOTE — PROGRESS NOTES
Spiritual Health History and Assessment/Progress Note  [facility]    Attempted Encounter,  ,  ,      Name: Bijal Walker MRN: [id number]    Age: 59 y.o.     Sex: female   Language: English   Pentecostal: [employer]   Hematemesis     Date: 1/30/2025                           Spiritual Assessment began in [facility] ENDOSCOPY        Referral/Consult From: Rounding   Encounter Overview/Reason: Attempted Encounter  Service Provided For: Patient not available    Cassia, Belief, Meaning:   Patient unable to assess at this time  Family/Friends No family/friends present      Importance and Influence:  Patient unable to assess at this time  Family/Friends No family/friends present    Community:  Patient feels well-supported. Support system includes: Spouse/Partner  Family/Friends No family/friends present    Assessment and Plan of Care:     Patient Interventions include: Other: Unable to address due to patient being off unit for a procedure at the time.  Family/Friends Interventions include: No family/friends present    Patient Plan of Care: Spiritual Care available upon further referral  Family/Friends Plan of Care: No family/friends present    Electronically signed by Chaplain Elizabeth on 1/30/2025 at 2:01 PM

## 2025-01-30 NOTE — CARE COORDINATION
Introduced my self and provided explanation of CM role to patient and  Arturo at bedside. Admitted for hematemesis. General Surgery is following, s/p EGD today. Hgb 9.1 > 8.0. She voices she resides in a 1 story home with her spouse and completes her adl's with independence. Patient is established with Dr. Ceja. Patient denies any home needs at this time, will continue to follow.  Rand HOYTN, RN  Case Management

## 2025-01-30 NOTE — ANESTHESIA PRE PROCEDURE
Department of Anesthesiology  Preprocedure Note       Name:  Bijal Walker   Age:  59 y.o.  :  1965                                          MRN:  [id number]         Date:  2025      Surgeon: Surgeon(s):  Sid Cody MD    Procedure: Procedure(s):  ESOPHAGOGASTRODUODENOSCOPY    Medications prior to admission:   Prior to Admission medications    Medication Sig Start Date End Date Taking? Authorizing Provider   amLODIPine (NORVASC) 10 MG tablet Take 1 tablet by mouth daily 25  Yes BrennaJignesh F, DO   losartan (COZAAR) 100 MG tablet Take 1 tablet by mouth daily 25  Yes BrennaJignesh F, DO   metoprolol tartrate (LOPRESSOR) 50 MG tablet Take 1 tablet by mouth twice daily 25  Yes Jignesh Ceja, DO   aspirin 81 MG chewable tablet Take 1 tablet by mouth daily   Yes Provider, MD Sharlene   Semaglutide-Weight Management (WEGOVY) 0.25 MG/0.5ML SOAJ SC injection Inject 0.25 mg into the skin every 7 days  Patient not taking: Reported on 2025   Jignesh Ceja DO       Current medications:    Current Facility-Administered Medications   Medication Dose Route Frequency Provider Last Rate Last Admin    0.9 % sodium chloride infusion   IntraVENous PRN Samuel Manley DO        [Held by provider] amLODIPine (NORVASC) tablet 10 mg  10 mg Oral Daily Kenneth aLrry MD        [Held by provider] aspirin chewable tablet 81 mg  81 mg Oral Daily Kenneth Larry MD        [Held by provider] losartan (COZAAR) tablet 100 mg  100 mg Oral Daily Kenneth Larry MD        metoprolol tartrate (LOPRESSOR) tablet 50 mg  50 mg Oral BID Kenneth Larry MD   50 mg at 25 0826    sodium chloride flush 0.9 % injection 5-40 mL  5-40 mL IntraVENous 2 times per day Kenneth Larry MD   10 mL at 25 2235    sodium chloride flush 0.9 % injection 5-40 mL  5-40 mL IntraVENous PRN Kenneth Larry MD        0.9 % sodium chloride infusion   IntraVENous PRN Kenneth Larry

## 2025-01-31 VITALS
HEIGHT: 64 IN | HEART RATE: 78 BPM | SYSTOLIC BLOOD PRESSURE: 111 MMHG | TEMPERATURE: 98.1 F | DIASTOLIC BLOOD PRESSURE: 71 MMHG | WEIGHT: 238 LBS | RESPIRATION RATE: 16 BRPM | BODY MASS INDEX: 40.63 KG/M2 | OXYGEN SATURATION: 98 %

## 2025-01-31 LAB
ERYTHROCYTE [DISTWIDTH] IN BLOOD BY AUTOMATED COUNT: 14.3 % (ref 11.5–15)
ERYTHROCYTE [DISTWIDTH] IN BLOOD BY AUTOMATED COUNT: 14.3 % (ref 11.5–15)
HCT VFR BLD AUTO: 25.1 % (ref 34–48)
HCT VFR BLD AUTO: 28.8 % (ref 34–48)
HGB BLD-MCNC: 7.9 G/DL (ref 11.5–15.5)
HGB BLD-MCNC: 8.9 G/DL (ref 11.5–15.5)
MCH RBC QN AUTO: 27.5 PG (ref 26–35)
MCH RBC QN AUTO: 27.9 PG (ref 26–35)
MCHC RBC AUTO-ENTMCNC: 30.9 G/DL (ref 32–34.5)
MCHC RBC AUTO-ENTMCNC: 31.5 G/DL (ref 32–34.5)
MCV RBC AUTO: 88.7 FL (ref 80–99.9)
MCV RBC AUTO: 88.9 FL (ref 80–99.9)
PLATELET # BLD AUTO: 189 K/UL (ref 130–450)
PLATELET # BLD AUTO: 207 K/UL (ref 130–450)
PMV BLD AUTO: 10.5 FL (ref 7–12)
PMV BLD AUTO: 10.7 FL (ref 7–12)
RBC # BLD AUTO: 2.83 M/UL (ref 3.5–5.5)
RBC # BLD AUTO: 3.24 M/UL (ref 3.5–5.5)
WBC OTHER # BLD: 7.3 K/UL (ref 4.5–11.5)
WBC OTHER # BLD: 9.5 K/UL (ref 4.5–11.5)

## 2025-01-31 PROCEDURE — 6360000002 HC RX W HCPCS: Performed by: SURGERY

## 2025-01-31 PROCEDURE — 2500000003 HC RX 250 WO HCPCS: Performed by: SURGERY

## 2025-01-31 PROCEDURE — 99232 SBSQ HOSP IP/OBS MODERATE 35: CPT | Performed by: SURGERY

## 2025-01-31 PROCEDURE — 99239 HOSP IP/OBS DSCHRG MGMT >30: CPT | Performed by: STUDENT IN AN ORGANIZED HEALTH CARE EDUCATION/TRAINING PROGRAM

## 2025-01-31 PROCEDURE — 85027 COMPLETE CBC AUTOMATED: CPT

## 2025-01-31 PROCEDURE — 6370000000 HC RX 637 (ALT 250 FOR IP): Performed by: SURGERY

## 2025-01-31 PROCEDURE — 6370000000 HC RX 637 (ALT 250 FOR IP): Performed by: STUDENT IN AN ORGANIZED HEALTH CARE EDUCATION/TRAINING PROGRAM

## 2025-01-31 RX ORDER — METOPROLOL TARTRATE 25 MG/1
25 TABLET, FILM COATED ORAL 2 TIMES DAILY
Status: DISCONTINUED | OUTPATIENT
Start: 2025-01-31 | End: 2025-01-31 | Stop reason: HOSPADM

## 2025-01-31 RX ADMIN — SODIUM CHLORIDE, PRESERVATIVE FREE 40 MG: 5 INJECTION INTRAVENOUS at 04:50

## 2025-01-31 RX ADMIN — METOPROLOL TARTRATE 25 MG: 50 TABLET, FILM COATED ORAL at 08:25

## 2025-01-31 RX ADMIN — SUCRALFATE 1 G: 1 TABLET ORAL at 04:50

## 2025-01-31 RX ADMIN — SODIUM CHLORIDE, PRESERVATIVE FREE 10 ML: 5 INJECTION INTRAVENOUS at 08:26

## 2025-01-31 NOTE — DISCHARGE SUMMARY
[facility] Hospitalist Physician Discharge Summary       Sid Cody MD  [address] [id number]  [phone]    Schedule an appointment as soon as possible for a visit  hospital follow up      Activity level: as tolerated    Dispo: home      Condition on discharge: stable    Patient ID:  Bijal Walker  [id number]  59 y.o.  1965    Admit date: 1/29/2025    Discharge date and time:  1/31/2025  4:44 PM    Admission Diagnoses: Principal Problem:    Hematemesis  Active Problems:    Essential hypertension    Class 3 severe obesity due to excess calories with serious comorbidity and body mass index (BMI) of 40.0 to 44.9 in adult    Hypotension    Acute blood loss anemia    Gastrointestinal hemorrhage  Resolved Problems:    * No resolved hospital problems. *      Discharge Diagnoses: Principal Problem:    Hematemesis  Active Problems:    Essential hypertension    Class 3 severe obesity due to excess calories with serious comorbidity and body mass index (BMI) of 40.0 to 44.9 in adult    Hypotension    Acute blood loss anemia    Gastrointestinal hemorrhage  Resolved Problems:    * No resolved hospital problems. *      Consults:  IP CONSULT TO PRIMARY CARE PROVIDER  IP CONSULT TO GENERAL SURGERY  IP CONSULT TO VASCULAR ACCESS TEAM  IP CONSULT TO VASCULAR ACCESS TEAM    Procedures:   EGD 1/30  Impression:         - Exam included retroflexion in the stomach         - Detailed oropharyngeal/laryngeal examinations not performed         -  Z-line regular, 35 cm from the incisors.         -  Non-bleeding gastric ulcers with a clean ulcer base (Michel            Class III).  Biopsied.         -  Normal examined duodenum.  Recommendation:         -  Await pathology results.         -  Take prescribed proton pump inhibitor or H2 blocker (antacid)            medications 30 - 60 minutes before meals.         - Carafate    Hospital Course:   Patient Bijal Walker is a 59 y.o. presented with Hematemesis      CONTINUE taking these medications      amLODIPine 10 MG tablet  Commonly known as: NORVASC  Take 1 tablet by mouth daily     aspirin 81 MG chewable tablet     losartan 100 MG tablet  Commonly known as: COZAAR  Take 1 tablet by mouth daily     metoprolol tartrate 50 MG tablet  Commonly known as: LOPRESSOR  Take 1 tablet by mouth twice daily            STOP taking these medications      Wegovy 0.25 MG/0.5ML Soaj SC injection  Generic drug: Semaglutide-Weight Management               Where to Get Your Medications        These medications were sent to [place] [id number] [facility] -  [phone] - F [phone]  [address] [place] [address]      Phone: [phone]   pantoprazole 40 MG tablet  sucralfate 1 GM tablet           Note that 31 minutes was spent in preparing discharge papers, discussing discharge with patient, medication review, etc.    NOTE: Portions of this report may have been transcribed using voice recognition software. Every effort was made to ensure accuracy; however, inadvertent computerized transcription errors may be present.    Signed:  Electronically signed by Jennifer Grossman MD on 1/31/2025 at 4:44 PM

## 2025-01-31 NOTE — DISCHARGE INSTRUCTIONS
Do not take any NSAID's (Ibuprofen, naproxen, meloxicam...) as this will worsen your ulcer disease    HOLD your Losartan and Norvasc until your blood pressure is consistently above 120/80    REDUCE your Lopressor to 25mg twice daily until your blood pressure is above 120/80    GO for labs in 2-3 days to check your blood count

## 2025-01-31 NOTE — PROGRESS NOTES
GENERAL SURGERY  DAILY PROGRESS NOTE  1/31/2025    Subjective:  No new complaints or overnight events.  Patient underwent EGD yesterday.  She denies abdominal pain since the procedure.  She has tolerated a diet since endoscopy    Objective:  BP (!) 93/55   Pulse 80   Temp 98.1 °F (36.7 °C) (Oral)   Resp 16   Ht 1.626 m (5' 4\")   Wt 108 kg (238 lb)   SpO2 97%   BMI 40.85 kg/m²     General Appearance:  awake, alert, oriented, in no acute distress  Skin:  Skin color, texture, turgor normal  Head/face:  NCAT  Eyes:  No gross abnormalities. Sclera nonicteric  Lungs/Chest:  Normal expansion. No respiratory distress.   Heart: Warm throughout. Regular rate   Abdomen:  Soft, no tenderness, obese  Extremities: Extremities warm to touch, pink      I have personally reviewed all relevant labs and imaging.  Hemoglobin declined from 8.3 to 7.9      Assessment/Plan:  59 y.o. female with upper GI bleed.  History of NSAID use.  EGD 1/30 showing Michel class III antral ulcers    -NSAID cessation strongly recommended  -Peptic ulcer diet as tolerated  -PPI twice daily  -Carafate 4 times daily  -Recheck CBC at noon.  Okay for discharge if no further significant decrease in hemoglobin    Electronically signed by Leoncio Noel DO on 1/31/2025 at 9:27 AM         Attending Physician Statement:    Chief Complaint: Hematemesis, anemia    I have examined the patient and performed the key aspects of physical exam, reviewed the record (including all new notes, pertinent radiology images which are independently reviewed and laboratory findings), and discussed the case with the surgical team.  I agree with the assessment and plan with the following additions, corrections, and changes. 14pt review of symptoms completed and negative except as mentioned.    Doing well.  Hemoglobin improved this afternoon.  No signs of bleeding.  She can discharge from a surgical standpoint with PPI and Carafate.    Sid Cody MD  01/31/25  2:11

## 2025-01-31 NOTE — CARE COORDINATION
Discharge order noted. Plan is home with no home needs identified. Patients  will transport upon discharge.  Rand HOYTN, RN  Case Management

## 2025-01-31 NOTE — PROGRESS NOTES
Dr Grossman at bedside. Notified of BP. Half dose of lopressor ordered and given. Recheck HGb at 1200 & report. Will F/U as ordered.

## 2025-01-31 NOTE — ACP (ADVANCE CARE PLANNING)
Advance Care Planning   Healthcare Decision Maker:    Primary Decision Maker: Arturo Walker - Spouse - [phone]    Click here to complete Healthcare Decision Makers including selection of the Healthcare Decision Maker Relationship (ie \"Primary\").  Today we documented Decision Maker(s) consistent with Legal Next of Kin hierarchy.

## 2025-02-03 ENCOUNTER — TELEPHONE (OUTPATIENT)
Dept: PRIMARY CARE CLINIC | Age: 60
End: 2025-02-03

## 2025-02-03 DIAGNOSIS — K92.0 GASTROINTESTINAL HEMORRHAGE WITH HEMATEMESIS: ICD-10-CM

## 2025-02-03 LAB
HCT VFR BLD CALC: 30.4 % (ref 34–48)
HEMOGLOBIN: 9.3 G/DL (ref 11.5–15.5)
MCH RBC QN AUTO: 28.4 PG (ref 26–35)
MCHC RBC AUTO-ENTMCNC: 30.6 G/DL (ref 32–34.5)
MCV RBC AUTO: 93 FL (ref 80–99.9)
PDW BLD-RTO: 14.6 % (ref 11.5–15)
PLATELET # BLD: 319 K/UL (ref 130–450)
PMV BLD AUTO: 11 FL (ref 7–12)
RBC # BLD: 3.27 M/UL (ref 3.5–5.5)
WBC # BLD: 8.4 K/UL (ref 4.5–11.5)

## 2025-02-03 NOTE — TELEPHONE ENCOUNTER
Care Transitions Initial Follow Up Call    Outreach made within 2 business days of discharge: Yes    Patient: Bijal Walker Patient : 1965   MRN: [id number]  Reason for Admission: Hematemesis  Discharge Date: 25       Spoke with: Patient    Discharge department/facility: [facility] Interactive Patient Contact:  Was patient able to fill all prescriptions: Yes  Was patient instructed to bring all medications to the follow-up visit: Yes  Is patient taking all medications as directed in the discharge summary? Yes  Does patient understand their discharge instructions: Yes  Does patient have questions or concerns that need addressed prior to 7-14 day follow up office visit: no    Additional needs identified to be addressed with provider  No needs identified             Scheduled appointment with PCP within 7-14 days    Follow Up  Future Appointments   Date Time Provider Department Center   2025 11:30 AM Jignesh Ceja DO [facility] DEP   2025 10:15 AM [facility] RM [facility] Rad[facility]Car   3/5/2025  9:15 AM [facility]       Saray Funk MA

## 2025-02-04 LAB — SURGICAL PATHOLOGY REPORT: NORMAL

## 2025-02-06 ENCOUNTER — OFFICE VISIT (OUTPATIENT)
Dept: PRIMARY CARE CLINIC | Age: 60
End: 2025-02-06

## 2025-02-06 VITALS
TEMPERATURE: 98.1 F | DIASTOLIC BLOOD PRESSURE: 78 MMHG | SYSTOLIC BLOOD PRESSURE: 132 MMHG | HEART RATE: 60 BPM | BODY MASS INDEX: 40.63 KG/M2 | RESPIRATION RATE: 16 BRPM | WEIGHT: 238 LBS | OXYGEN SATURATION: 99 % | HEIGHT: 64 IN

## 2025-02-06 DIAGNOSIS — K92.0 GASTROINTESTINAL HEMORRHAGE WITH HEMATEMESIS: ICD-10-CM

## 2025-02-06 DIAGNOSIS — K27.9 PUD (PEPTIC ULCER DISEASE): ICD-10-CM

## 2025-02-06 DIAGNOSIS — R53.83 FATIGUE, UNSPECIFIED TYPE: ICD-10-CM

## 2025-02-06 DIAGNOSIS — Z09 HOSPITAL DISCHARGE FOLLOW-UP: Primary | ICD-10-CM

## 2025-02-06 DIAGNOSIS — D64.9 ANEMIA, UNSPECIFIED TYPE: ICD-10-CM

## 2025-02-06 DIAGNOSIS — D62 ACUTE BLOOD LOSS ANEMIA: ICD-10-CM

## 2025-02-06 ASSESSMENT — PATIENT HEALTH QUESTIONNAIRE - PHQ9
9. THOUGHTS THAT YOU WOULD BE BETTER OFF DEAD, OR OF HURTING YOURSELF: NOT AT ALL
2. FEELING DOWN, DEPRESSED OR HOPELESS: NOT AT ALL
3. TROUBLE FALLING OR STAYING ASLEEP: NOT AT ALL
10. IF YOU CHECKED OFF ANY PROBLEMS, HOW DIFFICULT HAVE THESE PROBLEMS MADE IT FOR YOU TO DO YOUR WORK, TAKE CARE OF THINGS AT HOME, OR GET ALONG WITH OTHER PEOPLE: NOT DIFFICULT AT ALL
4. FEELING TIRED OR HAVING LITTLE ENERGY: NOT AT ALL
5. POOR APPETITE OR OVEREATING: NOT AT ALL
SUM OF ALL RESPONSES TO PHQ QUESTIONS 1-9: 0
8. MOVING OR SPEAKING SO SLOWLY THAT OTHER PEOPLE COULD HAVE NOTICED. OR THE OPPOSITE, BEING SO FIGETY OR RESTLESS THAT YOU HAVE BEEN MOVING AROUND A LOT MORE THAN USUAL: NOT AT ALL
6. FEELING BAD ABOUT YOURSELF - OR THAT YOU ARE A FAILURE OR HAVE LET YOURSELF OR YOUR FAMILY DOWN: NOT AT ALL
1. LITTLE INTEREST OR PLEASURE IN DOING THINGS: NOT AT ALL
SUM OF ALL RESPONSES TO PHQ QUESTIONS 1-9: 0
SUM OF ALL RESPONSES TO PHQ9 QUESTIONS 1 & 2: 0
SUM OF ALL RESPONSES TO PHQ QUESTIONS 1-9: 0
7. TROUBLE CONCENTRATING ON THINGS, SUCH AS READING THE NEWSPAPER OR WATCHING TELEVISION: NOT AT ALL
SUM OF ALL RESPONSES TO PHQ QUESTIONS 1-9: 0

## 2025-02-06 NOTE — PROGRESS NOTES
Hematemesis    Hypotension    Acute blood loss anemia    Gastrointestinal hemorrhage       Medications listed as ordered at the time of discharge from hospital     Medication List            Accurate as of February 6, 2025 12:34 PM. If you have any questions, ask your nurse or doctor.                CONTINUE taking these medications      amLODIPine 10 MG tablet  Commonly known as: NORVASC  Take 1 tablet by mouth daily     aspirin 81 MG chewable tablet     losartan 100 MG tablet  Commonly known as: COZAAR  Take 1 tablet by mouth daily     metoprolol tartrate 50 MG tablet  Commonly known as: LOPRESSOR  Take 1 tablet by mouth twice daily     pantoprazole 40 MG tablet  Commonly known as: PROTONIX  Take 1 tablet by mouth 2 times daily (before meals)     sucralfate 1 GM tablet  Commonly known as: Carafate  Take 1 tablet by mouth 4 times daily                Medications marked \"taking\" at this time  Outpatient Medications Marked as Taking for the 2/6/25 encounter (Office Visit) with Jignesh Ceja DO   Medication Sig Dispense Refill    pantoprazole (PROTONIX) 40 MG tablet Take 1 tablet by mouth 2 times daily (before meals) 60 tablet 1    sucralfate (CARAFATE) 1 GM tablet Take 1 tablet by mouth 4 times daily 120 tablet 1    amLODIPine (NORVASC) 10 MG tablet Take 1 tablet by mouth daily 90 tablet 1    losartan (COZAAR) 100 MG tablet Take 1 tablet by mouth daily 90 tablet 1    metoprolol tartrate (LOPRESSOR) 50 MG tablet Take 1 tablet by mouth twice daily 180 tablet 1    aspirin 81 MG chewable tablet Take 1 tablet by mouth daily          Medications patient taking as of now reconciled against medications ordered at time of hospital discharge: Yes    A comprehensive review of systems was negative except for what was noted in the HPI.    Objective:    /78   Pulse 60   Temp 98.1 °F (36.7 °C)   Resp 16   Ht 1.626 m (5' 4\")   Wt 108 kg (238 lb)   SpO2 99%   BMI 40.85 kg/m²   General Appearance: alert and oriented

## 2025-02-12 ENCOUNTER — HOSPITAL ENCOUNTER (OUTPATIENT)
Dept: MAMMOGRAPHY | Age: 60
Discharge: HOME | End: 2025-02-14
Attending: FAMILY MEDICINE
Payer: COMMERCIAL

## 2025-02-12 VITALS — WEIGHT: 220 LBS | BODY MASS INDEX: 37.56 KG/M2 | HEIGHT: 64 IN

## 2025-02-12 DIAGNOSIS — Z12.31 ENCOUNTER FOR SCREENING MAMMOGRAM FOR BREAST CANCER: ICD-10-CM

## 2025-02-12 PROCEDURE — 77063 BREAST TOMOSYNTHESIS BI: CPT

## 2025-02-26 DIAGNOSIS — K92.0 GASTROINTESTINAL HEMORRHAGE WITH HEMATEMESIS: ICD-10-CM

## 2025-02-26 LAB
BASOPHILS ABSOLUTE: 0.06 K/UL (ref 0–0.2)
BASOPHILS RELATIVE PERCENT: 1 % (ref 0–2)
EOSINOPHILS ABSOLUTE: 0.32 K/UL (ref 0.05–0.5)
EOSINOPHILS RELATIVE PERCENT: 5 % (ref 0–6)
HCT VFR BLD CALC: 39.3 % (ref 34–48)
HEMOGLOBIN: 11.7 G/DL (ref 11.5–15.5)
IMMATURE GRANULOCYTES %: 0 % (ref 0–5)
IMMATURE GRANULOCYTES ABSOLUTE: <0.03 K/UL (ref 0–0.58)
LYMPHOCYTES ABSOLUTE: 1.96 K/UL (ref 1.5–4)
LYMPHOCYTES RELATIVE PERCENT: 28 % (ref 20–42)
MCH RBC QN AUTO: 26.7 PG (ref 26–35)
MCHC RBC AUTO-ENTMCNC: 29.8 G/DL (ref 32–34.5)
MCV RBC AUTO: 89.7 FL (ref 80–99.9)
MONOCYTES ABSOLUTE: 0.78 K/UL (ref 0.1–0.95)
MONOCYTES RELATIVE PERCENT: 11 % (ref 2–12)
NEUTROPHILS ABSOLUTE: 3.85 K/UL (ref 1.8–7.3)
NEUTROPHILS RELATIVE PERCENT: 55 % (ref 43–80)
PDW BLD-RTO: 13.9 % (ref 11.5–15)
PLATELET # BLD: 292 K/UL (ref 130–450)
PMV BLD AUTO: 10.7 FL (ref 7–12)
RBC # BLD: 4.38 M/UL (ref 3.5–5.5)
WBC # BLD: 7 K/UL (ref 4.5–11.5)

## 2025-03-05 ENCOUNTER — OFFICE VISIT (OUTPATIENT)
Age: 60
End: 2025-03-05
Payer: COMMERCIAL

## 2025-03-05 VITALS
SYSTOLIC BLOOD PRESSURE: 124 MMHG | WEIGHT: 238 LBS | BODY MASS INDEX: 40.63 KG/M2 | DIASTOLIC BLOOD PRESSURE: 84 MMHG | HEIGHT: 64 IN

## 2025-03-05 DIAGNOSIS — M54.42 CHRONIC MIDLINE LOW BACK PAIN WITH BILATERAL SCIATICA: Primary | ICD-10-CM

## 2025-03-05 DIAGNOSIS — M43.10 RETROLISTHESIS OF VERTEBRAE: ICD-10-CM

## 2025-03-05 DIAGNOSIS — M51.362 DEGENERATION OF INTERVERTEBRAL DISC OF LUMBAR REGION WITH DISCOGENIC BACK PAIN AND LOWER EXTREMITY PAIN: ICD-10-CM

## 2025-03-05 DIAGNOSIS — G89.29 CHRONIC MIDLINE LOW BACK PAIN WITH BILATERAL SCIATICA: Primary | ICD-10-CM

## 2025-03-05 DIAGNOSIS — M54.41 CHRONIC MIDLINE LOW BACK PAIN WITH BILATERAL SCIATICA: Primary | ICD-10-CM

## 2025-03-05 PROCEDURE — 3079F DIAST BP 80-89 MM HG: CPT | Performed by: STUDENT IN AN ORGANIZED HEALTH CARE EDUCATION/TRAINING PROGRAM

## 2025-03-05 PROCEDURE — 3074F SYST BP LT 130 MM HG: CPT | Performed by: STUDENT IN AN ORGANIZED HEALTH CARE EDUCATION/TRAINING PROGRAM

## 2025-03-05 PROCEDURE — 99204 OFFICE O/P NEW MOD 45 MIN: CPT | Performed by: STUDENT IN AN ORGANIZED HEALTH CARE EDUCATION/TRAINING PROGRAM

## 2025-03-05 RX ORDER — CYCLOBENZAPRINE HCL 5 MG
5 TABLET ORAL DAILY PRN
Qty: 30 TABLET | Refills: 1 | Status: SHIPPED | OUTPATIENT
Start: 2025-03-05 | End: 2025-05-04

## 2025-03-05 RX ORDER — PREGABALIN 50 MG/1
50 CAPSULE ORAL EVERY EVENING
Qty: 30 CAPSULE | Refills: 1 | Status: SHIPPED | OUTPATIENT
Start: 2025-03-05 | End: 2025-05-04

## 2025-03-05 NOTE — PROGRESS NOTES
Opal Martinez MD  Physical Medicine & Rehabilitation  [address] [place] [id number]  [phone]     Referred by:  Dr. Ceja    PCP: Jignesh Ceja DO (General)    Reason for referral: chronic back pain    History of Present Illness:  Bijal Walker is a 59 y.o. female with a history of  has a past medical history of Arthritis and Essential hypertension. presenting today for evaluation of her chief complaint:  chronic gluteal back pain.  External notes/medical records independently reviewed and pertinent information found was incorporated.    Location: pain is in the gluteal region and radiates to the posterior thighs  Feels like there is tailbone pain in the midline as well    Onset: back pain restarted 2-3 months ago - no injury    Severity: 9/10 after working; pain is better when off work    Quality: feels like a hot poker shoved in the back    Radiating Pain: she reports a chronic history of \"sciatica\" which has been present for about 2 years - no inciting injury  Has had previous severe sciatica in the past as well which she reported was from sleeping on the couch    Frequency: constant    Course: sciatica has improved over 2 years     Numbness/Tingling: no - previous history of feet and lower extremity n/t     Weakness: none reported    Alleviating factors: recumbency position    Exacerbating factors: laying flat on the back, standing for prolonged periods of time, bending over    The percentage of axial to limb pain is 100% and 0%, respectively.    Not present: history of cancer, night sweats, fevers, unintentional weight loss, immunosuppression, new bowel or bladder dysfunction, and osteoporosis  Present:  pain awakens from sleep if she rolls over, reports some saddle anesthesia daily (self resolves after about 5-10 minutes)    Currently on Anticoagulation: None    Current/Prior treatments for this issue: acetaminophen- takes the edge off, NSAID- now contraindicated, SNRI- previously on Cymbalta,

## 2025-03-17 RX ORDER — LOSARTAN POTASSIUM 50 MG/1
100 TABLET ORAL DAILY
Qty: 180 TABLET | Refills: 0 | Status: SHIPPED | OUTPATIENT
Start: 2025-03-17

## 2025-03-27 ENCOUNTER — RESULTS FOLLOW-UP (OUTPATIENT)
Dept: PHYSICAL MEDICINE AND REHAB | Age: 60
End: 2025-03-27

## 2025-04-02 ENCOUNTER — HOSPITAL ENCOUNTER (OUTPATIENT)
Dept: MRI IMAGING | Age: 60
Discharge: HOME | End: 2025-04-04
Attending: STUDENT IN AN ORGANIZED HEALTH CARE EDUCATION/TRAINING PROGRAM
Payer: COMMERCIAL

## 2025-04-02 DIAGNOSIS — M54.42 CHRONIC MIDLINE LOW BACK PAIN WITH BILATERAL SCIATICA: ICD-10-CM

## 2025-04-02 DIAGNOSIS — G89.29 CHRONIC MIDLINE LOW BACK PAIN WITH BILATERAL SCIATICA: ICD-10-CM

## 2025-04-02 DIAGNOSIS — M54.41 CHRONIC MIDLINE LOW BACK PAIN WITH BILATERAL SCIATICA: ICD-10-CM

## 2025-04-02 DIAGNOSIS — M43.10 RETROLISTHESIS OF VERTEBRAE: ICD-10-CM

## 2025-04-02 DIAGNOSIS — M51.362 DEGENERATION OF INTERVERTEBRAL DISC OF LUMBAR REGION WITH DISCOGENIC BACK PAIN AND LOWER EXTREMITY PAIN: ICD-10-CM

## 2025-04-02 PROCEDURE — 72148 MRI LUMBAR SPINE W/O DYE: CPT

## 2025-04-09 ENCOUNTER — RESULTS FOLLOW-UP (OUTPATIENT)
Age: 60
End: 2025-04-09

## 2025-04-10 ENCOUNTER — OFFICE VISIT (OUTPATIENT)
Dept: PHYSICAL MEDICINE AND REHAB | Age: 60
End: 2025-04-10
Payer: COMMERCIAL

## 2025-04-10 VITALS
DIASTOLIC BLOOD PRESSURE: 92 MMHG | BODY MASS INDEX: 39.93 KG/M2 | HEIGHT: 64 IN | HEART RATE: 84 BPM | SYSTOLIC BLOOD PRESSURE: 169 MMHG | WEIGHT: 233.91 LBS

## 2025-04-10 DIAGNOSIS — G89.29 CHRONIC MIDLINE LOW BACK PAIN WITH BILATERAL SCIATICA: ICD-10-CM

## 2025-04-10 DIAGNOSIS — M54.16 LUMBAR RADICULITIS: Primary | ICD-10-CM

## 2025-04-10 DIAGNOSIS — M54.41 CHRONIC MIDLINE LOW BACK PAIN WITH BILATERAL SCIATICA: ICD-10-CM

## 2025-04-10 DIAGNOSIS — M54.42 CHRONIC MIDLINE LOW BACK PAIN WITH BILATERAL SCIATICA: ICD-10-CM

## 2025-04-10 DIAGNOSIS — M51.362 DEGENERATION OF INTERVERTEBRAL DISC OF LUMBAR REGION WITH DISCOGENIC BACK PAIN AND LOWER EXTREMITY PAIN: ICD-10-CM

## 2025-04-10 DIAGNOSIS — M43.10 RETROLISTHESIS OF VERTEBRAE: ICD-10-CM

## 2025-04-10 PROCEDURE — 99214 OFFICE O/P EST MOD 30 MIN: CPT | Performed by: STUDENT IN AN ORGANIZED HEALTH CARE EDUCATION/TRAINING PROGRAM

## 2025-04-10 PROCEDURE — 3080F DIAST BP >= 90 MM HG: CPT | Performed by: STUDENT IN AN ORGANIZED HEALTH CARE EDUCATION/TRAINING PROGRAM

## 2025-04-10 PROCEDURE — 3077F SYST BP >= 140 MM HG: CPT | Performed by: STUDENT IN AN ORGANIZED HEALTH CARE EDUCATION/TRAINING PROGRAM

## 2025-04-10 RX ORDER — PREGABALIN 50 MG/1
50 CAPSULE ORAL 2 TIMES DAILY
Qty: 60 CAPSULE | Refills: 1 | Status: SHIPPED | OUTPATIENT
Start: 2025-04-10 | End: 2025-06-09

## 2025-04-10 RX ORDER — CYCLOBENZAPRINE HCL 5 MG
5 TABLET ORAL 2 TIMES DAILY PRN
Qty: 45 TABLET | Refills: 1 | Status: SHIPPED | OUTPATIENT
Start: 2025-04-10 | End: 2025-06-09

## 2025-04-10 NOTE — PROGRESS NOTES
Opal Martinez MD  Physical Medicine & Rehabilitation  [address] [address]  [phone]     4/10/25    Chief Complaint   Patient presents with    Follow-up     Patient presents for MRI/xray and pt follow up. Reports no benefit from PT, feels that she can do all these things at home, states that she went to 4 visits at [facility] in [facility] with all things that she was already doing and no results. Reports little to no change. Reports that the flexeril seems to take the edge off and doesn't cause drowsiness.        HPI:  Bijal Walker is a 59 y.o. year old woman seen today in follow up regarding low back pain and MRI results.     History of Present Illness  The patient presents for evaluation of low back and gluteal pain.    She reports no discernible improvement in her condition following the initiation of Lyrica. However, she has found some relief with Flexeril, which she has been administering more frequently. The patient has been taking Flexeril 5 mg twice daily on most days, and it does not induce fatigue. She ran out of Flexeril after 20 days and had one refill remaining. She is uncertain about the efficacy of Lyrica but is open to increasing the dosage to twice daily. She is not on any blood thinners and has no known allergies to steroids or lidocaine. She is not diabetic and has no history of kidney disease. She is on a daily regimen of baby aspirin for stroke prevention due to a family history of strokes but has no personal history of heart attacks.    Her pain is predominantly localized to the tailbone/gluteal area, with occasional radiation down the posterior aspect of her legs, more so on the right side. The pain is persistent.      Not present: history of cancer, night sweats, fevers, unintentional weight loss, immunosuppression, new bowel or bladder dysfunction, and osteoporosis  Present:  pain awakens from sleep if she rolls over, reports some saddle anesthesia daily (self

## 2025-04-21 ENCOUNTER — TELEPHONE (OUTPATIENT)
Dept: PHYSICAL MEDICINE AND REHAB | Age: 60
End: 2025-04-21

## 2025-04-21 ENCOUNTER — PREP FOR PROCEDURE (OUTPATIENT)
Dept: PHYSICAL MEDICINE AND REHAB | Age: 60
End: 2025-04-21

## 2025-04-21 DIAGNOSIS — M54.16 LUMBAR RADICULITIS: ICD-10-CM

## 2025-04-21 DIAGNOSIS — M43.10 RETROLISTHESIS OF VERTEBRAE: ICD-10-CM

## 2025-04-21 DIAGNOSIS — M51.362 DEGENERATION OF INTERVERTEBRAL DISC OF LUMBAR REGION WITH DISCOGENIC BACK PAIN AND LOWER EXTREMITY PAIN: ICD-10-CM

## 2025-04-21 NOTE — TELEPHONE ENCOUNTER
Called and spoke with the patient and she is scheduled on 4-24-25 for tfesi.  Patient was informed to not take 81 mg aspirin or ibuprofen 24 hrs before the procedure and the surgery center will call her the day before the procedure after 3 pm to inform her the time of the procedure.  Patient is aware and voiced understanding.

## 2025-04-22 RX ORDER — SODIUM CHLORIDE 0.9 % (FLUSH) 0.9 %
5-40 SYRINGE (ML) INJECTION EVERY 12 HOURS SCHEDULED
Status: CANCELLED | OUTPATIENT
Start: 2025-04-22

## 2025-04-22 RX ORDER — KRILL/OM-3/DHA/EPA/PHOSPHO/AST 500-110 MG
1 CAPSULE ORAL
COMMUNITY

## 2025-04-22 RX ORDER — SODIUM CHLORIDE 9 MG/ML
INJECTION, SOLUTION INTRAVENOUS PRN
Status: CANCELLED | OUTPATIENT
Start: 2025-04-22

## 2025-04-22 RX ORDER — SODIUM CHLORIDE 0.9 % (FLUSH) 0.9 %
5-40 SYRINGE (ML) INJECTION PRN
Status: CANCELLED | OUTPATIENT
Start: 2025-04-22

## 2025-04-24 ENCOUNTER — HOSPITAL ENCOUNTER (OUTPATIENT)
Age: 60
Setting detail: OUTPATIENT SURGERY
Discharge: HOME | End: 2025-04-24
Attending: PHYSICAL MEDICINE & REHABILITATION | Admitting: PHYSICAL MEDICINE & REHABILITATION
Payer: COMMERCIAL

## 2025-04-24 ENCOUNTER — HOSPITAL ENCOUNTER (OUTPATIENT)
Dept: OPERATING ROOM | Age: 60
Setting detail: OUTPATIENT SURGERY
Discharge: HOME | End: 2025-04-24
Attending: PHYSICAL MEDICINE & REHABILITATION
Payer: COMMERCIAL

## 2025-04-24 VITALS
BODY MASS INDEX: 39.78 KG/M2 | OXYGEN SATURATION: 98 % | RESPIRATION RATE: 16 BRPM | HEIGHT: 64 IN | DIASTOLIC BLOOD PRESSURE: 80 MMHG | WEIGHT: 233 LBS | HEART RATE: 70 BPM | SYSTOLIC BLOOD PRESSURE: 134 MMHG

## 2025-04-24 DIAGNOSIS — M51.9 LUMBAR DISC DISEASE: ICD-10-CM

## 2025-04-24 PROCEDURE — 2709999900 HC NON-CHARGEABLE SUPPLY: Performed by: PHYSICAL MEDICINE & REHABILITATION

## 2025-04-24 PROCEDURE — 6360000002 HC RX W HCPCS: Performed by: PHYSICAL MEDICINE & REHABILITATION

## 2025-04-24 PROCEDURE — 2580000003 HC RX 258: Performed by: PHYSICAL MEDICINE & REHABILITATION

## 2025-04-24 PROCEDURE — 7100000010 HC PHASE II RECOVERY - FIRST 15 MIN: Performed by: PHYSICAL MEDICINE & REHABILITATION

## 2025-04-24 PROCEDURE — 6360000004 HC RX CONTRAST MEDICATION: Performed by: PHYSICAL MEDICINE & REHABILITATION

## 2025-04-24 PROCEDURE — 3600000005 HC SURGERY LEVEL 5 BASE: Performed by: PHYSICAL MEDICINE & REHABILITATION

## 2025-04-24 PROCEDURE — 7100000011 HC PHASE II RECOVERY - ADDTL 15 MIN: Performed by: PHYSICAL MEDICINE & REHABILITATION

## 2025-04-24 RX ORDER — LIDOCAINE HYDROCHLORIDE 10 MG/ML
INJECTION, SOLUTION EPIDURAL; INFILTRATION; INTRACAUDAL; PERINEURAL PRN
Status: DISCONTINUED | OUTPATIENT
Start: 2025-04-24 | End: 2025-04-24 | Stop reason: ALTCHOICE

## 2025-04-24 RX ORDER — SODIUM CHLORIDE 9 MG/ML
INJECTION, SOLUTION INTRAVENOUS PRN
Status: DISCONTINUED | OUTPATIENT
Start: 2025-04-24 | End: 2025-04-24 | Stop reason: HOSPADM

## 2025-04-24 RX ORDER — SODIUM CHLORIDE 0.9 % (FLUSH) 0.9 %
5-40 SYRINGE (ML) INJECTION PRN
Status: DISCONTINUED | OUTPATIENT
Start: 2025-04-24 | End: 2025-04-24 | Stop reason: HOSPADM

## 2025-04-24 RX ORDER — SODIUM CHLORIDE 0.9 % (FLUSH) 0.9 %
5-40 SYRINGE (ML) INJECTION EVERY 12 HOURS SCHEDULED
Status: DISCONTINUED | OUTPATIENT
Start: 2025-04-24 | End: 2025-04-24 | Stop reason: HOSPADM

## 2025-04-24 ASSESSMENT — PAIN DESCRIPTION - DESCRIPTORS: DESCRIPTORS: ACHING;SHARP

## 2025-04-24 ASSESSMENT — PAIN - FUNCTIONAL ASSESSMENT
PAIN_FUNCTIONAL_ASSESSMENT: NONE - DENIES PAIN
PAIN_FUNCTIONAL_ASSESSMENT: NONE - DENIES PAIN
PAIN_FUNCTIONAL_ASSESSMENT: 0-10

## 2025-04-24 NOTE — OP NOTE
LUMBOSACRAL EPIDURAL STEROID INJECTION, TRANSFORAMINAL APPROACH       WITH FLUOROSCOPIC GUIDANCE    Patient: Bijal Walker                         MRN#: [id number]  : 1965   Date of procedure: 2025      Physician Performing Procedure:  Mamta Huntley DO    Clinical Scenario: As per electronic documentation.     Diagnosis: lumbar radiculitis     Injectate: A total of 5cc, consisting of 1 cc of Dexamethasone 10mg/ml,  with the remainder of normal saline    Levels Treated:  bilateral L5-S1 neural foramen    Approach:  Transforaminal    Improvement after today's procedure: As per nursing record.    Comments:  none     Pre-procedural evaluation: The patient was examined today just prior to performing the procedure listed above.  The patient's heart rate was normal, lungs were clear to auscultation bilaterally.    Procedure: The patient was prepped and draped in a sterile fashion in the prone position after informed consent was signed and all the patient's questions were answered including the risks, benefits, alternative treatment options, and prognosis.  The risks include - but are not limited to - infection, allergic reaction, increased pain, lack of therapeutic benefit, steroid reaction, nerve damage, paralysis, stroke, epidural hematoma, syncope, headache, respiratory or cardiac arrest, and scar formation.    The C-arm was positioned so that an oblique view of the neural foramen as noted above was visualized.  The soft tissues overlying this structure were infiltrated with 2-3 cc. of 1% Lidocaine without Epinephrine.    A 22 gauge 5 inch spinal needle was inserted toward the target using a trajectory view along the fluoroscope beam.  Under AP and lateral visualization, the needle was advanced so it did not puncture dura.  Biplanar projections were used to confirm position. Aspiration was confirmed to be negative for CSF and/or blood.  A 1-2 cc. volume of omnipaque contrast was injected at this

## 2025-04-24 NOTE — DISCHARGE INSTRUCTIONS
Post-op instruction Block  Dr. Huntley  [phone]      Rest 12-24 hours following procedure. DO NOT DRIVE till the following day.    Keep dressing clean and dry. Do not bathe, shower, or sit in hot tub the day of procedure .You may remove the dressing following A.M.    You may return to work/school tomorrow.    Drink extra fluids for the next 24 hours.    Resume your regular diet.    Resume previously prescribed medications. Resume Coumadin, Plavix, NSAIDS, Ibuprofen products 24 hours after procedure.    You need to have a responsible adult stay with you this evening.    If you experience any discomfort relating to this procedure, you may take Tylenol 2 tablets every 4 hrs as needed for pain and/or apply ice to the affected area.    Please follow up with Dr. Huntley or Dr. Stinson. If you were a hip injection follow up with your orthopedic Doctor.    If you experience any unusual symptoms or problems, call your physician’s answering service at [phone] or go directly to [facility]’s Emergency Department.

## 2025-04-24 NOTE — H&P
Mamta Huntley DO  [facility] Physical Medicine and Rehabilitation   [facility] [place] [address]  Phone: [phone]  Fax: [phone]    PCP: Jignesh Ceja DO  Date of visit: 2025    CC: low back pain       Bijal Walker is a 59 y.o. female who presents today for epidural steroid injection.   No red flag symptoms. Consents to proceed with procedure.     No Known Allergies    Current Facility-Administered Medications   Medication Dose Route Frequency Provider Last Rate Last Admin    sodium chloride flush 0.9 % injection 5-40 mL  5-40 mL IntraVENous 2 times per day Mamta Huntley DO        sodium chloride flush 0.9 % injection 5-40 mL  5-40 mL IntraVENous PRN Mamta Huntley DO        0.9 % sodium chloride infusion   IntraVENous PRN Mamta Huntley DO           Past Medical History:   Diagnosis Date    Arthritis     osteoarthritis    Essential hypertension 2017       Past Surgical History:   Procedure Laterality Date    ARTHRODESIS Left 2022    ARTHRODESIS FIRST DIGIT LEFT FOOT performed by Pedro Luis Toth DPM at [facility] OR     SECTION      TUBAL LIGATION      then reversed in     UPPER GASTROINTESTINAL ENDOSCOPY N/A 2025    ESOPHAGOGASTRODUODENOSCOPY BIOPSY performed by Sid Cody MD at [facility] ENDOSCOPY       No family history on file.    Social History     Tobacco Use    Smoking status: Former     Current packs/day: 0.00     Average packs/day: 0.5 packs/day for 35.0 years (17.5 ttl pk-yrs)     Types: Cigarettes     Start date: 1974     Quit date: 2009     Years since quitting: 15.9    Smokeless tobacco: Never   Vaping Use    Vaping status: Never Used   Substance Use Topics    Alcohol use: Yes     Comment: socially    Drug use: Never              ROS:    Constitutional: Denies fevers, chills, night sweats, unintentional weight loss     Skin: Denies rash or skin changes     Respiratory: Denies SOB

## 2025-04-25 DIAGNOSIS — M43.10 RETROLISTHESIS OF VERTEBRAE: ICD-10-CM

## 2025-04-25 DIAGNOSIS — M54.42 CHRONIC MIDLINE LOW BACK PAIN WITH BILATERAL SCIATICA: ICD-10-CM

## 2025-04-25 DIAGNOSIS — M54.41 CHRONIC MIDLINE LOW BACK PAIN WITH BILATERAL SCIATICA: ICD-10-CM

## 2025-04-25 DIAGNOSIS — G89.29 CHRONIC MIDLINE LOW BACK PAIN WITH BILATERAL SCIATICA: ICD-10-CM

## 2025-04-25 DIAGNOSIS — M54.16 LUMBAR RADICULITIS: ICD-10-CM

## 2025-04-25 DIAGNOSIS — M51.362 DEGENERATION OF INTERVERTEBRAL DISC OF LUMBAR REGION WITH DISCOGENIC BACK PAIN AND LOWER EXTREMITY PAIN: ICD-10-CM

## 2025-05-09 ENCOUNTER — HOSPITAL ENCOUNTER (OUTPATIENT)
Dept: OPERATING ROOM | Age: 60
Discharge: HOME | End: 2025-05-09

## 2025-05-09 DIAGNOSIS — M51.9 LUMBAR DISC DISEASE: ICD-10-CM

## 2025-07-30 ENCOUNTER — OFFICE VISIT (OUTPATIENT)
Age: 60
End: 2025-07-30
Payer: COMMERCIAL

## 2025-07-30 VITALS
HEART RATE: 72 BPM | HEIGHT: 64 IN | DIASTOLIC BLOOD PRESSURE: 74 MMHG | BODY MASS INDEX: 36.7 KG/M2 | WEIGHT: 215 LBS | SYSTOLIC BLOOD PRESSURE: 130 MMHG

## 2025-07-30 DIAGNOSIS — M54.41 CHRONIC MIDLINE LOW BACK PAIN WITH BILATERAL SCIATICA: Primary | ICD-10-CM

## 2025-07-30 DIAGNOSIS — M54.16 LUMBAR RADICULITIS: ICD-10-CM

## 2025-07-30 DIAGNOSIS — M51.362 DEGENERATION OF INTERVERTEBRAL DISC OF LUMBAR REGION WITH DISCOGENIC BACK PAIN AND LOWER EXTREMITY PAIN: ICD-10-CM

## 2025-07-30 DIAGNOSIS — M48.061 SPINAL STENOSIS OF LUMBAR REGION, UNSPECIFIED WHETHER NEUROGENIC CLAUDICATION PRESENT: ICD-10-CM

## 2025-07-30 DIAGNOSIS — M43.10 RETROLISTHESIS OF VERTEBRAE: ICD-10-CM

## 2025-07-30 DIAGNOSIS — M54.42 CHRONIC MIDLINE LOW BACK PAIN WITH BILATERAL SCIATICA: Primary | ICD-10-CM

## 2025-07-30 DIAGNOSIS — G89.29 CHRONIC MIDLINE LOW BACK PAIN WITH BILATERAL SCIATICA: Primary | ICD-10-CM

## 2025-07-30 PROCEDURE — 3078F DIAST BP <80 MM HG: CPT | Performed by: STUDENT IN AN ORGANIZED HEALTH CARE EDUCATION/TRAINING PROGRAM

## 2025-07-30 PROCEDURE — 99214 OFFICE O/P EST MOD 30 MIN: CPT | Performed by: STUDENT IN AN ORGANIZED HEALTH CARE EDUCATION/TRAINING PROGRAM

## 2025-07-30 PROCEDURE — 3075F SYST BP GE 130 - 139MM HG: CPT | Performed by: STUDENT IN AN ORGANIZED HEALTH CARE EDUCATION/TRAINING PROGRAM

## 2025-07-30 RX ORDER — PREDNISONE 10 MG/1
TABLET ORAL
Qty: 30 TABLET | Refills: 0 | Status: SHIPPED | OUTPATIENT
Start: 2025-07-30 | End: 2025-08-11

## (undated) DEVICE — DOUBLE BASIN SET: Brand: MEDLINE INDUSTRIES, INC.

## (undated) DEVICE — 3 ML SYRINGE LUER-LOCK TIP: Brand: MONOJECT

## (undated) DEVICE — INTENDED FOR TISSUE SEPARATION, AND OTHER PROCEDURES THAT REQUIRE A SHARP SURGICAL BLADE TO PUNCTURE OR CUT.: Brand: BARD-PARKER ® STAINLESS STEEL BLADES

## (undated) DEVICE — BANDAGE COMPR W6INXL10YD ST M E WHITE/BEIGE

## (undated) DEVICE — TOWEL,OR,DSP,ST,BLUE,STD,6/PK,12PK/CS: Brand: MEDLINE

## (undated) DEVICE — NEEDLE HYPO 25GA L1.5IN BLU POLYPR HUB S STL REG BVL STR

## (undated) DEVICE — NEEDLE HYPO 22GA L1.5IN BLK POLYPR HUB S STL REG BVL STR

## (undated) DEVICE — PACK PROCEDURE SURG GEN CUST

## (undated) DEVICE — SET ORTHO STD STORTSTD1

## (undated) DEVICE — INSTRUMENT SYSTEM 4 BATTERY REUSABLE

## (undated) DEVICE — SPONGE GZ W4XL4IN RAYON POLY CVR W/NONWOVEN FAB STRL 2/PK

## (undated) DEVICE — DRAPE CARM MINI FOR IMAG SYS INSIGHT FLROSCN

## (undated) DEVICE — DRAPE C ARM W41XL74IN UNIV MOB W RUBBERBAND CLP

## (undated) DEVICE — SOLUTION IV IRRIG POUR BRL 0.9% SODIUM CHL 2F7124

## (undated) DEVICE — NEEDLE HYPO 18GA L1.5IN PNK POLYPR HUB S STL REG BVL STR

## (undated) DEVICE — BNDG,ELSTC,MATRIX,STRL,3"X5YD,LF,HOOK&LP: Brand: MEDLINE

## (undated) DEVICE — FORCEPS BX OVL CUP FEN DISPOSABLE CAP L 160CM RAD JAW 4

## (undated) DEVICE — CHLORAPREP 26ML ORANGE

## (undated) DEVICE — K WIRE FIX L6IN DIA0.062IN 1600662] MICROAIRE SURGICAL INSTRUMENTS INC]
Type: IMPLANTABLE DEVICE | Site: FOOT | Status: NON-FUNCTIONAL
Removed: 2022-04-22

## (undated) DEVICE — DRAPE,EXTREMITY,89X128,STERILE: Brand: MEDLINE

## (undated) DEVICE — BANDAGE,GAUZE,CONFORMING,3"X75",STRL,LF: Brand: MEDLINE

## (undated) DEVICE — TRAY DRILL SYSTEM 4 REUSABLE

## (undated) DEVICE — REAMER SURG DIA18MM METATRSL FOR COMPHSVE FT SYS

## (undated) DEVICE — BANDAGE ADH W1XL3IN NAT FAB WVN FLX DURABLE N ADH PD SEAL

## (undated) DEVICE — Device

## (undated) DEVICE — GLOVE,SURG,SIGNATURE LTX MICR,LTX,PF,7.0: Brand: MEDLINE

## (undated) DEVICE — BNDG,ELSTC,MATRIX,STRL,4"X5YD,LF,HOOK&LP: Brand: MEDLINE

## (undated) DEVICE — BANDAGE,GAUZE,CONFORMING,4"X75",STRL,LF: Brand: MEDLINE

## (undated) DEVICE — GAUZE,SPONGE,4"X4",12PLY,STERILE,LF,2'S: Brand: MEDLINE

## (undated) DEVICE — DRESSING,GAUZE,PETROLATUM,CURAD,1"X8",ST: Brand: CURAD

## (undated) DEVICE — GAUZE,SPONGE,4"X4",8PLY,STRL,LF,10/TRAY: Brand: MEDLINE

## (undated) DEVICE — APPLICATOR MEDICATED 26 CC SOLUTION HI LT ORNG CHLORAPREP

## (undated) DEVICE — GOWN,SIRUS,FABRNF,XL,20/CS: Brand: MEDLINE

## (undated) DEVICE — TRAY LAMBOTS REUSABLE

## (undated) DEVICE — SET PSI

## (undated) DEVICE — SHOE POSTOP M MAN 9-11 UNIV FOAM TRICOT SEMI FLX SKID

## (undated) DEVICE — GLOVE ORANGE PI 8   MSG9080

## (undated) DEVICE — MARKER,SKIN,WI/RULER AND LABELS: Brand: MEDLINE

## (undated) DEVICE — ENCORE® LATEX TEXTURED SIZE 6.5, STERILE LATEX POWDER-FREE SURGICAL GLOVE: Brand: ENCORE

## (undated) DEVICE — 4-PORT MANIFOLD: Brand: NEPTUNE 2

## (undated) DEVICE — PRECISION THIN (7.0 X 0.38 X 29.5MM)

## (undated) DEVICE — 3M™ RED DOT™ MONITORING ELECTRODE WITH FOAM TAPE AND STICKY GEL 2560, 50/BAG, 20/CASE, 72/PLT: Brand: RED DOT™

## (undated) DEVICE — COVER HNDL LT DISP

## (undated) DEVICE — SYSTEM TPS ORTHO

## (undated) DEVICE — ZIMMER® STERILE DISPOSABLE TOURNIQUET CUFF WITH PLC, DUAL PORT, SINGLE BLADDER, 18 IN. (46 CM)

## (undated) DEVICE — INSTRUMENT CURRETTES REUSABLE

## (undated) DEVICE — TUBING SUCT 12FR MAL ALUM SHFT FN CAP VENT UNIV CONN W/ OBT

## (undated) DEVICE — 6 ML SYRINGE LUER-LOCK TIP: Brand: MONOJECT

## (undated) DEVICE — GRADUATE TRIANG MEASURE 1000ML BLK PRNT

## (undated) DEVICE — NEEDLE SPNL 22GA L5IN BLK HUB S STL W/ QNCKE PNT W/OUT

## (undated) DEVICE — BANDAGE,GAUZE,BULKEE II,4.5"X4.1YD,STRL: Brand: MEDLINE

## (undated) DEVICE — DRESSING ALG W2XL5IN ANTIMIC WND JUMPSTART

## (undated) DEVICE — PIN BONE BB TAK
Type: IMPLANTABLE DEVICE | Site: FOOT | Status: NON-FUNCTIONAL
Removed: 2022-04-22

## (undated) DEVICE — ELECTRODE PT RET AD L9FT HI MOIST COND ADH HYDRGEL CORDED

## (undated) DEVICE — NON-DEHP CATHETER EXTENSION SET, MALE LUER LOCK ADAPTER

## (undated) DEVICE — SYRINGE MED 30ML STD CLR PLAS LUERLOCK TIP N CTRL DISP

## (undated) DEVICE — BLOCK BITE 60FR RUBBER ADLT DENTAL